# Patient Record
Sex: FEMALE | Race: WHITE | NOT HISPANIC OR LATINO | URBAN - METROPOLITAN AREA
[De-identification: names, ages, dates, MRNs, and addresses within clinical notes are randomized per-mention and may not be internally consistent; named-entity substitution may affect disease eponyms.]

---

## 2018-12-06 ENCOUNTER — EMERGENCY (EMERGENCY)
Facility: HOSPITAL | Age: 73
LOS: 1 days | Discharge: HOME | End: 2018-12-06
Attending: EMERGENCY MEDICINE
Payer: MEDICARE

## 2018-12-06 VITALS
TEMPERATURE: 99 F | SYSTOLIC BLOOD PRESSURE: 154 MMHG | RESPIRATION RATE: 18 BRPM | OXYGEN SATURATION: 97 % | HEIGHT: 64 IN | DIASTOLIC BLOOD PRESSURE: 105 MMHG | HEART RATE: 119 BPM | WEIGHT: 175.05 LBS

## 2018-12-06 LAB
ALBUMIN SERPL ELPH-MCNC: 4.4 G/DL — SIGNIFICANT CHANGE UP (ref 3.3–5)
ALP SERPL-CCNC: 94 U/L — SIGNIFICANT CHANGE UP (ref 40–120)
ALT FLD-CCNC: 11 U/L — SIGNIFICANT CHANGE UP (ref 10–45)
ANION GAP SERPL CALC-SCNC: 22 MMOL/L — HIGH (ref 5–17)
APTT BLD: 27.1 SEC — LOW (ref 27.5–36.3)
AST SERPL-CCNC: 20 U/L — SIGNIFICANT CHANGE UP (ref 10–40)
BASOPHILS # BLD AUTO: 0 K/UL — SIGNIFICANT CHANGE UP (ref 0–0.2)
BASOPHILS NFR BLD AUTO: 0.2 % — SIGNIFICANT CHANGE UP (ref 0–2)
BILIRUB SERPL-MCNC: 0.5 MG/DL — SIGNIFICANT CHANGE UP (ref 0.2–1.2)
BUN SERPL-MCNC: 27 MG/DL — HIGH (ref 7–23)
CALCIUM SERPL-MCNC: 7.5 MG/DL — LOW (ref 8.4–10.5)
CHLORIDE SERPL-SCNC: 99 MMOL/L — SIGNIFICANT CHANGE UP (ref 96–108)
CK MB CFR SERPL CALC: 1.5 NG/ML — SIGNIFICANT CHANGE UP (ref 0–3.8)
CK SERPL-CCNC: 102 U/L — SIGNIFICANT CHANGE UP (ref 25–170)
CO2 SERPL-SCNC: 20 MMOL/L — LOW (ref 22–31)
CREAT SERPL-MCNC: 1 MG/DL — SIGNIFICANT CHANGE UP (ref 0.5–1.3)
EOSINOPHIL # BLD AUTO: 0.2 K/UL — SIGNIFICANT CHANGE UP (ref 0–0.5)
EOSINOPHIL NFR BLD AUTO: 1 % — SIGNIFICANT CHANGE UP (ref 0–6)
GLUCOSE SERPL-MCNC: 175 MG/DL — HIGH (ref 70–99)
HCT VFR BLD CALC: 39.8 % — SIGNIFICANT CHANGE UP (ref 34.5–45)
HGB BLD-MCNC: 13.6 G/DL — SIGNIFICANT CHANGE UP (ref 11.5–15.5)
INR BLD: 1.44 RATIO — HIGH (ref 0.88–1.16)
LYMPHOCYTES # BLD AUTO: 13 % — SIGNIFICANT CHANGE UP (ref 13–44)
LYMPHOCYTES # BLD AUTO: 2 K/UL — SIGNIFICANT CHANGE UP (ref 1–3.3)
MCHC RBC-ENTMCNC: 30.5 PG — SIGNIFICANT CHANGE UP (ref 27–34)
MCHC RBC-ENTMCNC: 34.1 GM/DL — SIGNIFICANT CHANGE UP (ref 32–36)
MCV RBC AUTO: 89.5 FL — SIGNIFICANT CHANGE UP (ref 80–100)
MONOCYTES # BLD AUTO: 1 K/UL — HIGH (ref 0–0.9)
MONOCYTES NFR BLD AUTO: 7 % — SIGNIFICANT CHANGE UP (ref 2–14)
NEUTROPHILS # BLD AUTO: 11.9 K/UL — HIGH (ref 1.8–7.4)
NEUTROPHILS NFR BLD AUTO: 78.8 % — HIGH (ref 43–77)
PLATELET # BLD AUTO: 371 K/UL — SIGNIFICANT CHANGE UP (ref 150–400)
POTASSIUM SERPL-MCNC: 3.3 MMOL/L — LOW (ref 3.5–5.3)
POTASSIUM SERPL-SCNC: 3.3 MMOL/L — LOW (ref 3.5–5.3)
PROT SERPL-MCNC: 8.7 G/DL — HIGH (ref 6–8.3)
PROTHROM AB SERPL-ACNC: 16.8 SEC — HIGH (ref 10–12.9)
RBC # BLD: 4.45 M/UL — SIGNIFICANT CHANGE UP (ref 3.8–5.2)
RBC # FLD: 12.7 % — SIGNIFICANT CHANGE UP (ref 10.3–14.5)
SODIUM SERPL-SCNC: 141 MMOL/L — SIGNIFICANT CHANGE UP (ref 135–145)
TROPONIN T, HIGH SENSITIVITY RESULT: 16 NG/L — SIGNIFICANT CHANGE UP (ref 0–51)
TROPONIN T, HIGH SENSITIVITY RESULT: 16 NG/L — SIGNIFICANT CHANGE UP (ref 0–51)
WBC # BLD: 15.1 K/UL — HIGH (ref 3.8–10.5)
WBC # FLD AUTO: 15.1 K/UL — HIGH (ref 3.8–10.5)

## 2018-12-06 PROCEDURE — 71046 X-RAY EXAM CHEST 2 VIEWS: CPT | Mod: 26

## 2018-12-06 PROCEDURE — 82553 CREATINE MB FRACTION: CPT

## 2018-12-06 PROCEDURE — 99283 EMERGENCY DEPT VISIT LOW MDM: CPT | Mod: 25

## 2018-12-06 PROCEDURE — 80053 COMPREHEN METABOLIC PANEL: CPT

## 2018-12-06 PROCEDURE — 93005 ELECTROCARDIOGRAM TRACING: CPT

## 2018-12-06 PROCEDURE — 99284 EMERGENCY DEPT VISIT MOD MDM: CPT | Mod: GC,25

## 2018-12-06 PROCEDURE — 85730 THROMBOPLASTIN TIME PARTIAL: CPT

## 2018-12-06 PROCEDURE — 71046 X-RAY EXAM CHEST 2 VIEWS: CPT

## 2018-12-06 PROCEDURE — 93010 ELECTROCARDIOGRAM REPORT: CPT

## 2018-12-06 PROCEDURE — 85610 PROTHROMBIN TIME: CPT

## 2018-12-06 PROCEDURE — 82550 ASSAY OF CK (CPK): CPT

## 2018-12-06 PROCEDURE — 85027 COMPLETE CBC AUTOMATED: CPT

## 2018-12-06 PROCEDURE — 84484 ASSAY OF TROPONIN QUANT: CPT

## 2018-12-06 RX ORDER — SODIUM CHLORIDE 9 MG/ML
1000 INJECTION INTRAMUSCULAR; INTRAVENOUS; SUBCUTANEOUS ONCE
Qty: 0 | Refills: 0 | Status: COMPLETED | OUTPATIENT
Start: 2018-12-06 | End: 2018-12-06

## 2018-12-06 RX ADMIN — SODIUM CHLORIDE 2000 MILLILITER(S): 9 INJECTION INTRAMUSCULAR; INTRAVENOUS; SUBCUTANEOUS at 19:46

## 2018-12-06 NOTE — ED PROVIDER NOTE - OBJECTIVE STATEMENT
74 yo F c PMH of DM, HTN, HLD sent in by PMD for 1 week of fatigue, cough, and lightheadedness. no hx of sx. in pmd office today pt had ekg showing sinus tachycardia was sent to ED. no recent travel, no estrogen use, no recent surgery or travel, no personal or fh of dvt or pe.    ROS positive: cough, lightheadedness, fatigue, nausea, anorexia  ROS negative: f/c, congestion, coryza, pharyngitis, hemoptysis, abdominal pain, vomiting, leg edema, dysuria, hematuria, CP, SOB

## 2018-12-06 NOTE — ED PROVIDER NOTE - NSFOLLOWUPINSTRUCTIONS_ED_ALL_ED_FT
D/C with PMD follow up and anticipatory guidance.  Return for worsening or persistent symptoms. Such as but not limited to chest pain, difficulty breathing, weakness and numbness of your extremities, blood in your stool or urine.

## 2018-12-06 NOTE — ED ADULT NURSE NOTE - OBJECTIVE STATEMENT
73 year old female presents to ED via wheel chair through waiting room, sent by PMD complaining of lethargy, weakness, decreased appetite, mild HA, nausea x 1 week. History of DM, HTN, HLD. PMD sent her in after noting tachycardia to 119 in the office. Mild cough. Denies chest pain, shortness of breath, abdominal pain, diarrhea, dysuria, hematuria, recent illness or fall. Patient undressed and placed into gown, call bell in hand and side rails up with bed in lowest position for safety. blanket provided. Comfort and safety provided.

## 2018-12-06 NOTE — ED PROVIDER NOTE - ATTENDING CONTRIBUTION TO CARE
I, Lauren Howard MD, personally saw the patient with the resident, and completed the key components of the history and physical exam. I then discussed the management plan with the resident.   73 year old female with PMHx of DM, HTN, HLD, followed by internist/cardiologist Dr. Mock, went to him today complaining of 1 week of malaise, nausea, decreased PO intake, mild cough but resolving without sick contacts was found to be tachycardic and sent in for CXR/labs/fluids. No documented fever, no uri sx, no abd pain or diarrhea, last stress about 2 yrs ago per pt. On exam pt is in no acute distress but states she feels "lousy," tachycardic regular no murmurs, clear lungs, soft NTND abd, skin wnl. Suspect nonspecific viral syndrome but will r/o acute cardiac etiology with serial troponins, EKG, CXR, give fluids and and reassess

## 2018-12-06 NOTE — ED PROVIDER NOTE - MEDICAL DECISION MAKING DETAILS
72 yo F c PMH of DM, HTN, HLD sent in by PMD for 1 week of fatigue, cough, and lightheadedness. no hx of sx. in pmd office today pt had ekg showing sinus tachycardia was sent to ED. On exam, , VS otherwise wnl, CXR labs ekg pending, ivf for tx will reassess.

## 2018-12-07 VITALS
DIASTOLIC BLOOD PRESSURE: 78 MMHG | HEART RATE: 102 BPM | RESPIRATION RATE: 20 BRPM | SYSTOLIC BLOOD PRESSURE: 132 MMHG | TEMPERATURE: 99 F | OXYGEN SATURATION: 95 %

## 2018-12-11 ENCOUNTER — INPATIENT (INPATIENT)
Facility: HOSPITAL | Age: 73
LOS: 2 days | Discharge: HOME | DRG: 640 | End: 2018-12-14
Attending: INTERNAL MEDICINE | Admitting: INTERNAL MEDICINE
Payer: MEDICARE

## 2018-12-11 VITALS
DIASTOLIC BLOOD PRESSURE: 77 MMHG | TEMPERATURE: 98 F | HEIGHT: 64 IN | OXYGEN SATURATION: 95 % | RESPIRATION RATE: 18 BRPM | SYSTOLIC BLOOD PRESSURE: 129 MMHG | HEART RATE: 75 BPM | WEIGHT: 164.91 LBS

## 2018-12-11 DIAGNOSIS — E83.51 HYPOCALCEMIA: ICD-10-CM

## 2018-12-11 LAB
ALBUMIN SERPL ELPH-MCNC: 3.8 G/DL — SIGNIFICANT CHANGE UP (ref 3.3–5)
ALP SERPL-CCNC: 82 U/L — SIGNIFICANT CHANGE UP (ref 40–120)
ALT FLD-CCNC: 15 U/L — SIGNIFICANT CHANGE UP (ref 10–45)
ANION GAP SERPL CALC-SCNC: 21 MMOL/L — HIGH (ref 5–17)
ANION GAP SERPL CALC-SCNC: 24 MMOL/L — HIGH (ref 5–17)
APTT BLD: 27.6 SEC — SIGNIFICANT CHANGE UP (ref 27.5–36.3)
AST SERPL-CCNC: 29 U/L — SIGNIFICANT CHANGE UP (ref 10–40)
BASE EXCESS BLDV CALC-SCNC: -2.1 MMOL/L — LOW (ref -2–2)
BASOPHILS # BLD AUTO: 0.1 K/UL — SIGNIFICANT CHANGE UP (ref 0–0.2)
BASOPHILS NFR BLD AUTO: 0.4 % — SIGNIFICANT CHANGE UP (ref 0–2)
BILIRUB SERPL-MCNC: 0.6 MG/DL — SIGNIFICANT CHANGE UP (ref 0.2–1.2)
BUN SERPL-MCNC: 21 MG/DL — SIGNIFICANT CHANGE UP (ref 7–23)
BUN SERPL-MCNC: 23 MG/DL — SIGNIFICANT CHANGE UP (ref 7–23)
CA-I SERPL-SCNC: 0.81 MMOL/L — LOW (ref 1.12–1.3)
CALCIUM SERPL-MCNC: 6.3 MG/DL — CRITICAL LOW (ref 8.4–10.5)
CALCIUM SERPL-MCNC: 6.4 MG/DL — CRITICAL LOW (ref 8.4–10.5)
CALCIUM SERPL-MCNC: 6.7 MG/DL — LOW (ref 8.4–10.5)
CHLORIDE BLDV-SCNC: 103 MMOL/L — SIGNIFICANT CHANGE UP (ref 96–108)
CHLORIDE SERPL-SCNC: 97 MMOL/L — SIGNIFICANT CHANGE UP (ref 96–108)
CHLORIDE SERPL-SCNC: 98 MMOL/L — SIGNIFICANT CHANGE UP (ref 96–108)
CO2 BLDV-SCNC: 22 MMOL/L — SIGNIFICANT CHANGE UP (ref 22–30)
CO2 SERPL-SCNC: 19 MMOL/L — LOW (ref 22–31)
CO2 SERPL-SCNC: 20 MMOL/L — LOW (ref 22–31)
CREAT SERPL-MCNC: 0.97 MG/DL — SIGNIFICANT CHANGE UP (ref 0.5–1.3)
CREAT SERPL-MCNC: 1.06 MG/DL — SIGNIFICANT CHANGE UP (ref 0.5–1.3)
EOSINOPHIL # BLD AUTO: 0.1 K/UL — SIGNIFICANT CHANGE UP (ref 0–0.5)
EOSINOPHIL NFR BLD AUTO: 0.9 % — SIGNIFICANT CHANGE UP (ref 0–6)
GAS PNL BLDV: 138 MMOL/L — SIGNIFICANT CHANGE UP (ref 136–145)
GAS PNL BLDV: SIGNIFICANT CHANGE UP
GAS PNL BLDV: SIGNIFICANT CHANGE UP
GLUCOSE BLDC GLUCOMTR-MCNC: 171 MG/DL — HIGH (ref 70–99)
GLUCOSE BLDV-MCNC: 155 MG/DL — HIGH (ref 70–99)
GLUCOSE SERPL-MCNC: 162 MG/DL — HIGH (ref 70–99)
GLUCOSE SERPL-MCNC: 98 MG/DL — SIGNIFICANT CHANGE UP (ref 70–99)
HCO3 BLDV-SCNC: 21 MMOL/L — SIGNIFICANT CHANGE UP (ref 21–29)
HCT VFR BLD CALC: 38.3 % — SIGNIFICANT CHANGE UP (ref 34.5–45)
HCT VFR BLDA CALC: 39 % — SIGNIFICANT CHANGE UP (ref 39–50)
HGB BLD CALC-MCNC: 12.7 G/DL — SIGNIFICANT CHANGE UP (ref 11.5–15.5)
HGB BLD-MCNC: 12.8 G/DL — SIGNIFICANT CHANGE UP (ref 11.5–15.5)
INR BLD: 1.58 RATIO — HIGH (ref 0.88–1.16)
LACTATE BLDV-MCNC: 1.7 MMOL/L — SIGNIFICANT CHANGE UP (ref 0.7–2)
LYMPHOCYTES # BLD AUTO: 1.9 K/UL — SIGNIFICANT CHANGE UP (ref 1–3.3)
LYMPHOCYTES # BLD AUTO: 13.9 % — SIGNIFICANT CHANGE UP (ref 13–44)
MCHC RBC-ENTMCNC: 29.8 PG — SIGNIFICANT CHANGE UP (ref 27–34)
MCHC RBC-ENTMCNC: 33.5 GM/DL — SIGNIFICANT CHANGE UP (ref 32–36)
MCV RBC AUTO: 89.2 FL — SIGNIFICANT CHANGE UP (ref 80–100)
MONOCYTES # BLD AUTO: 1.3 K/UL — HIGH (ref 0–0.9)
MONOCYTES NFR BLD AUTO: 9.5 % — SIGNIFICANT CHANGE UP (ref 2–14)
NEUTROPHILS # BLD AUTO: 10.3 K/UL — HIGH (ref 1.8–7.4)
NEUTROPHILS NFR BLD AUTO: 75.3 % — SIGNIFICANT CHANGE UP (ref 43–77)
PCO2 BLDV: 32 MMHG — LOW (ref 35–50)
PH BLDV: 7.44 — SIGNIFICANT CHANGE UP (ref 7.35–7.45)
PLATELET # BLD AUTO: 353 K/UL — SIGNIFICANT CHANGE UP (ref 150–400)
PO2 BLDV: 66 MMHG — HIGH (ref 25–45)
POTASSIUM BLDV-SCNC: 2.8 MMOL/L — CRITICAL LOW (ref 3.5–5.3)
POTASSIUM SERPL-MCNC: 3 MMOL/L — LOW (ref 3.5–5.3)
POTASSIUM SERPL-MCNC: 3.1 MMOL/L — LOW (ref 3.5–5.3)
POTASSIUM SERPL-SCNC: 3 MMOL/L — LOW (ref 3.5–5.3)
POTASSIUM SERPL-SCNC: 3.1 MMOL/L — LOW (ref 3.5–5.3)
PROT SERPL-MCNC: 7.9 G/DL — SIGNIFICANT CHANGE UP (ref 6–8.3)
PROTHROM AB SERPL-ACNC: 18.3 SEC — HIGH (ref 10–12.9)
PTH-INTACT FLD-MCNC: 26 PG/ML — SIGNIFICANT CHANGE UP (ref 15–65)
RBC # BLD: 4.3 M/UL — SIGNIFICANT CHANGE UP (ref 3.8–5.2)
RBC # FLD: 12.9 % — SIGNIFICANT CHANGE UP (ref 10.3–14.5)
SAO2 % BLDV: 91 % — HIGH (ref 67–88)
SODIUM SERPL-SCNC: 139 MMOL/L — SIGNIFICANT CHANGE UP (ref 135–145)
SODIUM SERPL-SCNC: 140 MMOL/L — SIGNIFICANT CHANGE UP (ref 135–145)
WBC # BLD: 13.7 K/UL — HIGH (ref 3.8–10.5)
WBC # FLD AUTO: 13.7 K/UL — HIGH (ref 3.8–10.5)

## 2018-12-11 PROCEDURE — 99285 EMERGENCY DEPT VISIT HI MDM: CPT | Mod: GC,25

## 2018-12-11 PROCEDURE — 71046 X-RAY EXAM CHEST 2 VIEWS: CPT | Mod: 26

## 2018-12-11 PROCEDURE — 93010 ELECTROCARDIOGRAM REPORT: CPT

## 2018-12-11 PROCEDURE — 71275 CT ANGIOGRAPHY CHEST: CPT | Mod: 26

## 2018-12-11 RX ORDER — METOPROLOL TARTRATE 50 MG
200 TABLET ORAL DAILY
Qty: 0 | Refills: 0 | Status: DISCONTINUED | OUTPATIENT
Start: 2018-12-11 | End: 2018-12-14

## 2018-12-11 RX ORDER — POTASSIUM CHLORIDE 20 MEQ
20 PACKET (EA) ORAL THREE TIMES A DAY
Qty: 0 | Refills: 0 | Status: DISCONTINUED | OUTPATIENT
Start: 2018-12-11 | End: 2018-12-11

## 2018-12-11 RX ORDER — ATORVASTATIN CALCIUM 80 MG/1
40 TABLET, FILM COATED ORAL AT BEDTIME
Qty: 0 | Refills: 0 | Status: DISCONTINUED | OUTPATIENT
Start: 2018-12-11 | End: 2018-12-14

## 2018-12-11 RX ORDER — ACETAMINOPHEN 500 MG
975 TABLET ORAL ONCE
Qty: 0 | Refills: 0 | Status: COMPLETED | OUTPATIENT
Start: 2018-12-11 | End: 2018-12-11

## 2018-12-11 RX ORDER — AMLODIPINE BESYLATE 2.5 MG/1
5 TABLET ORAL DAILY
Qty: 0 | Refills: 0 | Status: DISCONTINUED | OUTPATIENT
Start: 2018-12-11 | End: 2018-12-14

## 2018-12-11 RX ORDER — POTASSIUM CHLORIDE 20 MEQ
40 PACKET (EA) ORAL ONCE
Qty: 0 | Refills: 0 | Status: COMPLETED | OUTPATIENT
Start: 2018-12-11 | End: 2018-12-11

## 2018-12-11 RX ORDER — LISINOPRIL 2.5 MG/1
40 TABLET ORAL DAILY
Qty: 0 | Refills: 0 | Status: DISCONTINUED | OUTPATIENT
Start: 2018-12-11 | End: 2018-12-14

## 2018-12-11 RX ORDER — CALCIUM CARBONATE 500(1250)
1 TABLET ORAL ONCE
Qty: 0 | Refills: 0 | Status: COMPLETED | OUTPATIENT
Start: 2018-12-11 | End: 2018-12-11

## 2018-12-11 RX ORDER — DEXTROSE MONOHYDRATE, SODIUM CHLORIDE, AND POTASSIUM CHLORIDE 50; .745; 4.5 G/1000ML; G/1000ML; G/1000ML
1000 INJECTION, SOLUTION INTRAVENOUS
Qty: 0 | Refills: 0 | Status: DISCONTINUED | OUTPATIENT
Start: 2018-12-11 | End: 2018-12-13

## 2018-12-11 RX ORDER — INFLUENZA VIRUS VACCINE 15; 15; 15; 15 UG/.5ML; UG/.5ML; UG/.5ML; UG/.5ML
0.5 SUSPENSION INTRAMUSCULAR ONCE
Qty: 0 | Refills: 0 | Status: COMPLETED | OUTPATIENT
Start: 2018-12-11 | End: 2018-12-14

## 2018-12-11 RX ORDER — PANTOPRAZOLE SODIUM 20 MG/1
40 TABLET, DELAYED RELEASE ORAL
Qty: 0 | Refills: 0 | Status: DISCONTINUED | OUTPATIENT
Start: 2018-12-11 | End: 2018-12-14

## 2018-12-11 RX ORDER — MAGNESIUM SULFATE 500 MG/ML
2 VIAL (ML) INJECTION ONCE
Qty: 0 | Refills: 0 | Status: COMPLETED | OUTPATIENT
Start: 2018-12-11 | End: 2018-12-11

## 2018-12-11 RX ORDER — POTASSIUM CHLORIDE 20 MEQ
20 PACKET (EA) ORAL
Qty: 0 | Refills: 0 | Status: DISCONTINUED | OUTPATIENT
Start: 2018-12-11 | End: 2018-12-13

## 2018-12-11 RX ADMIN — Medication 20 MILLIEQUIVALENT(S): at 23:43

## 2018-12-11 RX ADMIN — Medication 20 MILLIEQUIVALENT(S): at 21:19

## 2018-12-11 RX ADMIN — Medication 975 MILLIGRAM(S): at 15:29

## 2018-12-11 RX ADMIN — Medication 50 GRAM(S): at 11:41

## 2018-12-11 RX ADMIN — Medication 40 MILLIEQUIVALENT(S): at 11:41

## 2018-12-11 RX ADMIN — DEXTROSE MONOHYDRATE, SODIUM CHLORIDE, AND POTASSIUM CHLORIDE 100 MILLILITER(S): 50; .745; 4.5 INJECTION, SOLUTION INTRAVENOUS at 21:19

## 2018-12-11 RX ADMIN — Medication 1 TABLET(S): at 21:19

## 2018-12-11 RX ADMIN — ATORVASTATIN CALCIUM 40 MILLIGRAM(S): 80 TABLET, FILM COATED ORAL at 21:19

## 2018-12-11 RX ADMIN — Medication 1 TABLET(S): at 13:05

## 2018-12-11 RX ADMIN — PANTOPRAZOLE SODIUM 40 MILLIGRAM(S): 20 TABLET, DELAYED RELEASE ORAL at 20:35

## 2018-12-11 NOTE — H&P ADULT - NSHPLABSRESULTS_GEN_ALL_CORE
LABS:                        12.8   13.7  )-----------( 353      ( 11 Dec 2018 11:16 )             38.3     12-11    139  |  98  |  21  ----------------------------<  98  3.0<L>   |  20<L>  |  0.97    Ca    6.7<L>      11 Dec 2018 16:19    TPro  7.9  /  Alb  3.8  /  TBili  0.6  /  DBili  x   /  AST  29  /  ALT  15  /  AlkPhos  82  12-11    PT/INR - ( 11 Dec 2018 11:47 )   PT: 18.3 sec;   INR: 1.58 ratio         PTT - ( 11 Dec 2018 11:47 )  PTT:27.6 sec        RADIOLOGY & ADDITIONAL TESTS:

## 2018-12-11 NOTE — ED PROVIDER NOTE - PHYSICAL EXAMINATION
Lissette Lucia, .:   GENERAL: Patient awake alert NAD.  HEENT: NC/AT, Moist mucous membranes, PERRL, EOMI.  LUNGS: CTAB, no wheezes or crackles.   CARDIAC: RRR, no m/r/g.    ABDOMEN: Soft, NT, ND, No rebound, guarding.   EXT: No edema. No calf tenderness.  MSK: No spinal tenderness, no pain with movement, no deformities.  NEURO: A&Ox3. Gait normal.    SKIN: Warm and dry. No rash.  PSYCH: Normal affect.

## 2018-12-11 NOTE — ED PROVIDER NOTE - ATTENDING CONTRIBUTION TO CARE
pt sent in by dr johnson for k 2.8, ca 6.5, vague symptoms of malaise and poor appetite.   clear lungs, nad, non-tender abdomen. no leg swelling.   basic labs, given the ekg findings, low o2 sat and dr johnson echo in office w pulm art pressures in 50s, will ro ct.

## 2018-12-11 NOTE — ED ADULT TRIAGE NOTE - CHIEF COMPLAINT QUOTE
Pt reports 2 weeks ago she started with cough, malaise, nausea, decreased PO intake, flu-like sx.  Came to ED Thursday; labs, CXR, EKG were normal, pt got IV fluids and nausea meds, and d/c home to f/u with PCP.   PCP rx Cefuroxime and Zofran on Sunday because pt called and told him she had continued sx.   Pt saw PCP Monday (yesterday), got repeat labs and PCP called pt this morning for low K and Ca. Pt is on Diuretic HCTZ. Pt reports 2 weeks ago she started with cough, malaise, nausea, decreased PO intake, flu-like sx.  Came to ED Thursday; labs, CXR, EKG were normal, pt got IV fluids and nausea meds, and d/c home to f/u with PCP.   PCP rx Cefuroxime and Zofran on Sunday because pt called and told him she had continued sx.   Pt saw PCP Monday (yesterday), got repeat labs, told PCP she was also having palpitations, PCP called pt this morning for low K and Ca. Pt is on Diuretic HCTZ.

## 2018-12-11 NOTE — CHART NOTE - NSCHARTNOTEFT_GEN_A_CORE
MEDICINE NP    GOLDBERG, EDITH  73y Female    Patient is a 73y old  Female who presents with a chief complaint of Hypokalemia and hypocalcemia (11 Dec 2018 18:21)       > Event Summary:  74 y/o Female, Admitted with Hypocalcemia and Hypokalemia.    Patient seen at bedside, AAOx3, Denies chest pain, sob, palpitations, fatigue, weakness, N/V/D.    -D/w Attending, Dr. Osborne, Patient may hold off on Telemetry at present, change PO Potassium 20mEq to 4x/per day and f/u with AM Labs  -Will c/w monitoring and endorse to incoming shift in AM         -Vital Signs Last 24 Hrs  T(C): 36.3 (11 Dec 2018 19:31), Max: 36.8 (11 Dec 2018 10:50)  T(F): 97.3 (11 Dec 2018 19:31), Max: 98.2 (11 Dec 2018 10:50)  HR: 68 (11 Dec 2018 19:31) (59 - 75)  BP: 129/84 (11 Dec 2018 19:31) (129/77 - 137/83)  RR: 18 (11 Dec 2018 19:31) (18 - 18)  SpO2: 98% (11 Dec 2018 19:31) (90% - 99%)      Stephanie Braxton, JOVANY-BC  Medicine Department  #[id number]

## 2018-12-11 NOTE — ED ADULT NURSE NOTE - CHIEF COMPLAINT QUOTE
Pt reports 2 weeks ago she started with cough, malaise, nausea, decreased PO intake, flu-like sx.  Came to ED Thursday; labs, CXR, EKG were normal, pt got IV fluids and nausea meds, and d/c home to f/u with PCP.   PCP rx Cefuroxime and Zofran on Sunday because pt called and told him she had continued sx.   Pt saw PCP Monday (yesterday), got repeat labs, told PCP she was also having palpitations, PCP called pt this morning for low K and Ca. Pt is on Diuretic HCTZ.

## 2018-12-11 NOTE — ED PROVIDER NOTE - NS ED ROS FT
CONST: no fevers, +chills, +malaise  EYES: no pain, no vision changes  ENT: no sore throat, no ear pain, no change in hearing  CV: no chest pain, no leg swelling  RESP: no shortness of breath, no cough  ABD: no abdominal pain, +nausea, no vomiting, no diarrhea  : no dysuria, no flank pain, no hematuria  MSK: no back pain, no extremity pain  NEURO: no headache or additional neurologic complaints  HEME: no easy bleeding  SKIN:  no rash

## 2018-12-11 NOTE — CONSULT NOTE ADULT - SUBJECTIVE AND OBJECTIVE BOX
CHIEF COMPLAINT:Patient is a 73y old  Female who presents with a chief complaint of sob.    HPI: pt is well known to me.  3F h/o HTN, HLD, DM presents with lab results showing hypokalemia and hypocalcemia. Was here last week for URI symptoms, which persisted so went to PMD on monday, prescribed Cefuroxime and Zofran for nausea, and found abnormal lab results today. Admits to nausea, headache, general malaise and fatigue. Cough has improved. No other complaints.  hypoxic on ra - goes to 99 on 3lnc.    PAST MEDICAL & SURGICAL HISTORY:  HLD (hyperlipidemia)  DM (diabetes mellitus)  HTN (hypertension)  No significant past surgical history      MEDICATIONS  (STANDING):    MEDICATIONS  (PRN):      FAMILY HISTORY:  No pertinent family history in first degree relatives      SOCIAL HISTORY:    [ ] Non-smoker  [ ] Smoker  [ ] Alcohol    Allergies    No Known Allergies    Intolerances    	    REVIEW OF SYSTEMS:  CONSTITUTIONAL: No fever, weight loss, or fatigue  EYES: No eye pain, visual disturbances, or discharge  ENT:  No difficulty hearing, tinnitus, vertigo; No sinus or throat pain  NECK: No pain or stiffness  RESPIRATORY: No cough, wheezing, chills or hemoptysis; +Shortness of Breath  CARDIOVASCULAR: No chest pain, palpitations, passing out, dizziness, or leg swelling  GASTROINTESTINAL: No abdominal or epigastric pain. No nausea, vomiting, or hematemesis; No diarrhea or constipation. No melena or hematochezia.  GENITOURINARY: No dysuria, frequency, hematuria, or incontinence  NEUROLOGICAL: No headaches, memory loss, loss of strength, numbness, or tremors  SKIN: No itching, burning, rashes, or lesions   LYMPH Nodes: No enlarged glands  ENDOCRINE: No heat or cold intolerance; No hair loss  MUSCULOSKELETAL: No joint pain or swelling; No muscle, back, or extremity pain  PSYCHIATRIC: No depression, anxiety, mood swings, or difficulty sleeping  HEME/LYMPH: No easy bruising, or bleeding gums  ALLERGY AND IMMUNOLOGIC: No hives or eczema	    [ ] All others negative	  [ ] Unable to obtain    PHYSICAL EXAM:  T(C): 36.8 (12-11-18 @ 10:50), Max: 36.8 (12-11-18 @ 10:50)  HR: 67 (12-11-18 @ 15:32) (59 - 75)  BP: 137/83 (12-11-18 @ 15:32) (129/77 - 137/83)  RR: 18 (12-11-18 @ 15:32) (18 - 18)  SpO2: 99% (12-11-18 @ 15:32) (90% - 99%)  Wt(kg): --  I&O's Summary      Appearance: Normal	  HEENT:   Normal oral mucosa, PERRL, EOMI	  Lymphatic: No lymphadenopathy  Cardiovascular: Normal S1 S2, No JVD, + murmurs, + edema  Respiratory: Lungs clear to auscultation	  Psychiatry: A & O x 3, Mood & affect appropriate  Gastrointestinal:  Soft, Non-tender, + BS	  Skin: No rashes, No ecchymoses, No cyanosis	  Neurologic: Non-focal  Extremities: Normal range of motion, No clubbing, cyanosis or edema  Vascular: Peripheral pulses palpable 2+ bilaterally    TELEMETRY: 	    ECG:  	  RADIOLOGY:  OTHER: 	  	  LABS:	 	    CARDIAC MARKERS:                              12.8   13.7  )-----------( 353      ( 11 Dec 2018 11:16 )             38.3     12-11    139  |  98  |  21  ----------------------------<  98  3.0<L>   |  20<L>  |  0.97    Ca    6.7<L>      11 Dec 2018 16:19    TPro  7.9  /  Alb  3.8  /  TBili  0.6  /  DBili  x   /  AST  29  /  ALT  15  /  AlkPhos  82  12-11    proBNP:   Lipid Profile:   HgA1c:   TSH:   PT/INR - ( 11 Dec 2018 11:47 )   PT: 18.3 sec;   INR: 1.58 ratio         PTT - ( 11 Dec 2018 11:47 )  PTT:27.6 sec    PREVIOUS DIAGNOSTIC TESTING:    < from: CT Angio Chest w/ IV Cont (12.11.18 @ 12:58) >  Heartis normal in size. Calcification the coronary arteries is noted. No   pericardial effusion is noted. Pulmonary arteries are normal in caliber.   No filling defects are noted.    Very large hiatal hernia is noted.    No endobronchial lesions are noted. 0.4 cm solid nodule is noted in the   right middle lobe. Centrilobular emphysema is noted bilaterally. No   pleural effusions are noted.    Below the diaphragm, visualized portions of the abdomen are unremarkable.     Degenerative changes of the spine are noted.    Impression: No pulmonary embolus is noted.    Very large hiatal hernia.    < from: 12 Lead ECG (12.06.18 @ 21:00) >   SINUS TACHYCARDIA  LOW VOLTAGE QRS  NONSPECIFIC T WAVE ABNORMALITY  ABNORMAL ECG      < from: Xray Chest 2 Views PA/Lat (12.11.18 @ 12:23) >  The heart is normal in size. The lungs are clear. A big hiatal hernia is   seen on the left. No change in the appearance of the chest when compared   to previous study done December 6, 2018.    < end of copied text >

## 2018-12-11 NOTE — H&P ADULT - ASSESSMENT
73 year old female PMH HTN, HLD, DM(II), presents with lab results showing hypokalemia and hypocalcemia. Was here last week for URI symptoms, which persisted so went to PMD on Monday. Prescribed Cefuroxime and Zofran for nausea, and was found to have abnormal lab results today. Referred to the ER for eval and admit.  Admits to nausea, headache, general malaise and fatigue. Cough has improved. No other complaints.  In ER mild hypoxia noted.     CV: 73 year old female PMH HTN, HLD, DM(II), presents with lab results showing hypokalemia and hypocalcemia. Was here last week for URI symptoms, which persisted so went to PMD on Monday. Prescribed Cefuroxime and Zofran for nausea, and was found to have abnormal lab results today. Referred to the ER for eval and admit.  Admits to nausea, headache, general malaise and fatigue. Cough has improved. No other complaints.  In ER mild hypoxia noted.     CV: Stop HCTZ given hypokalemia. Continue Toprol  mg/day, Norvasc 5 mg/day and Lisinopril 40 mg/day.       Replace KCL TID. Cm 73 year old female PMH HTN, HLD, DM(II), presents with lab results showing hypokalemia and hypocalcemia. Was here last week for URI symptoms, which persisted so went to PMD on Monday. Prescribed Cefuroxime and Zofran for nausea, and was found to have abnormal lab results today. Referred to the ER for eval and admit.  Admits to nausea, headache, general malaise and fatigue. Cough has improved. No other complaints.  In ER mild hypoxia noted.     CV: Stop HCTZ given hypokalemia. BP stable on arrival 137/82.   Continue Toprol  mg/day, Norvasc 5 mg/day and Lisinopril 40 mg/day.     Renal: Replace KCL 20 meq TID. Magnesium level in AM. Elevated anion gap likely from dehydration.   IVF normal saline 100/h with 20 KCL. Calcium 6.3. Baseline level 7.5. Will replace OSCAL tid for now.   Recheck all lytes in AM.     Endo: Hold Metformen while in hospital and with increased anion gap acidosis.   Finger sticks AC and HS with SSC.     GI: Continue Prilosec 40 mg/day with Maalox prn.     COPD: CTA notes no PE or effusions. Mild centolobular emphysema noted.   No wheeze on exam. No treatment for now but does explain low oxygen sats in the ER.

## 2018-12-11 NOTE — ED PROVIDER NOTE - OBJECTIVE STATEMENT
73F h/o HTN, HLD, DM presents with lab results showing hypokalemia and hypocalcemia. Was here last week for URI symptoms, which persisted so went to PMD on monday, prescribed Cefuroxime and Zofran for nausea, and found abnormal lab results today. Admits to nausea, headache, general malaise and fatigue. Cough has improved. No other complaints. 73F h/o HTN, HLD, DM presents with lab results showing hypokalemia and hypocalcemia. Was here last week for URI symptoms, which persisted so went to PMD on monday, prescribed Cefuroxime and Zofran for nausea, and found abnormal lab results today. Admits to nausea, headache, general malaise and fatigue. Cough has improved. No other complaints.  hypoxic on ra - goes to 99 on 3lnc.

## 2018-12-11 NOTE — CONSULT NOTE ADULT - ASSESSMENT
pt with sob/cta result note  echo  tele  stress test depends on echo  repeat lytes  dvt prophylaxis  will f/u pt with sob/cta result note  echo  tele  stress test depends on echo  repeat lytes  dvt prophylaxis  will f/u  parathyroid level/albumin level

## 2018-12-11 NOTE — ED ADULT NURSE NOTE - OBJECTIVE STATEMENT
Pt is a 74 yo F who came to the ED amb c/o abnormal labs. States she had f/u labs done yesterday and has low Calcium and Potassium. States she has been feeling "crappy" for the past 2 weeks, feels fatigue, malaise, headache, nausea, cough, no fever. Pt was seen here for the same on Thurs, had labs, scans done, had a negative workup. States she still feels no improvement and saw her pmd yesterday who repeated the lab work. A/O x3, states cough has improved.

## 2018-12-11 NOTE — H&P ADULT - HISTORY OF PRESENT ILLNESS
73 year old female PMH HTN, HLD, DM(II), presents with lab results showing hypokalemia and hypocalcemia. Was here last week for URI symptoms, which persisted so went to PMD on Monday. Prescribed Cefuroxime and Zofran for nausea, and was found to have abnormal lab results today. Referred to the ER for eval and admit.  Admits to nausea, headache, general malaise and fatigue. Cough has improved. No other complaints.  In ER mild hypoxia noted. 73 year old female PMH HTN, HLD, DM(II), presents with lab results showing hypokalemia and hypocalcemia. Was here last week for URI symptoms, which persisted so went to PMD on Monday. Prescribed Cefuroxime and Zofran for nausea, and was found to have abnormal lab results today. Referred to the ER for eval and admit.  Admits to nausea, headache, general malaise and fatigue. Cough has improved. No other complaints.  In ER mild hypoxia noted. Also admits to limited oral intake for past few days.

## 2018-12-11 NOTE — H&P ADULT - NSHPPHYSICALEXAM_GEN_ALL_CORE
PHYSICAL EXAMINATION:  Vital Signs Last 24 Hrs  T(C): 36.8 (11 Dec 2018 10:50), Max: 36.8 (11 Dec 2018 10:50)  T(F): 98.2 (11 Dec 2018 10:50), Max: 98.2 (11 Dec 2018 10:50)  HR: 67 (11 Dec 2018 15:32) (59 - 75)  BP: 137/83 (11 Dec 2018 15:32) (129/77 - 137/83)  BP(mean): --  RR: 18 (11 Dec 2018 15:32) (18 - 18)  SpO2: 99% (11 Dec 2018 15:32) (90% - 99%)  CAPILLARY BLOOD GLUCOSE          GENERAL: NAD, well-groomed, well-developed  HEAD:  atraumatic, normocephalic  EYES: sclera anicteric  ENMT: mucous membranes moist  NECK: supple, No JVD  CHEST/LUNG: clear to auscultation bilaterally; no rales, rhonchi, or wheezing b/l  HEART: normal S1, S2  ABDOMEN: BS+, soft, ND, NT   EXTREMITIES:  pulses palpable; no clubbing, cyanosis, or edema b/l LEs  NEURO: awake, alert, interactive; moves all extremities  SKIN: no rashes or lesions PHYSICAL EXAMINATION:  Vital Signs Last 24 Hrs  T(C): 36.8 (11 Dec 2018 10:50), Max: 36.8 (11 Dec 2018 10:50)  T(F): 98.2 (11 Dec 2018 10:50), Max: 98.2 (11 Dec 2018 10:50)  HR: 67 (11 Dec 2018 15:32) (59 - 75)  BP: 137/83 (11 Dec 2018 15:32) (129/77 - 137/83)  BP(mean): --  RR: 18 (11 Dec 2018 15:32) (18 - 18)  SpO2: 99% (11 Dec 2018 15:32) (90% - 99%)  CAPILLARY BLOOD GLUCOSE          GENERAL: NAD, well-groomed, seen on 4 DSU, no SOB or CP  HEAD:  atraumatic, normocephalic  EYES: sclera anicteric  ENMT: mucous membranes moist  NECK: supple, No JVD  CHEST/LUNG: clear to auscultation bilaterally; no rales, rhonchi, or wheezing b/l  HEART: normal S1, S2  ABDOMEN: BS+, soft, ND, NT   EXTREMITIES:  pulses palpable; no clubbing, cyanosis, or edema b/l LEs  NEURO: awake, alert, interactive; moves all extremities  SKIN: no rashes or lesions

## 2018-12-12 ENCOUNTER — TRANSCRIPTION ENCOUNTER (OUTPATIENT)
Age: 73
End: 2018-12-12

## 2018-12-12 LAB
ALBUMIN SERPL ELPH-MCNC: 3.4 G/DL — SIGNIFICANT CHANGE UP (ref 3.3–5)
ALP SERPL-CCNC: 75 U/L — SIGNIFICANT CHANGE UP (ref 40–120)
ALT FLD-CCNC: 13 U/L — SIGNIFICANT CHANGE UP (ref 10–45)
ANION GAP SERPL CALC-SCNC: 15 MMOL/L — SIGNIFICANT CHANGE UP (ref 5–17)
AST SERPL-CCNC: 19 U/L — SIGNIFICANT CHANGE UP (ref 10–40)
BILIRUB DIRECT SERPL-MCNC: 0.1 MG/DL — SIGNIFICANT CHANGE UP (ref 0–0.2)
BILIRUB INDIRECT FLD-MCNC: 0.4 MG/DL — SIGNIFICANT CHANGE UP (ref 0.2–1)
BILIRUB SERPL-MCNC: 0.5 MG/DL — SIGNIFICANT CHANGE UP (ref 0.2–1.2)
BUN SERPL-MCNC: 22 MG/DL — SIGNIFICANT CHANGE UP (ref 7–23)
CALCIUM SERPL-MCNC: 7 MG/DL — LOW (ref 8.4–10.5)
CHLORIDE SERPL-SCNC: 104 MMOL/L — SIGNIFICANT CHANGE UP (ref 96–108)
CO2 SERPL-SCNC: 21 MMOL/L — LOW (ref 22–31)
CREAT SERPL-MCNC: 0.93 MG/DL — SIGNIFICANT CHANGE UP (ref 0.5–1.3)
GLUCOSE BLDC GLUCOMTR-MCNC: 117 MG/DL — HIGH (ref 70–99)
GLUCOSE BLDC GLUCOMTR-MCNC: 124 MG/DL — HIGH (ref 70–99)
GLUCOSE BLDC GLUCOMTR-MCNC: 135 MG/DL — HIGH (ref 70–99)
GLUCOSE BLDC GLUCOMTR-MCNC: 155 MG/DL — HIGH (ref 70–99)
GLUCOSE SERPL-MCNC: 95 MG/DL — SIGNIFICANT CHANGE UP (ref 70–99)
HCT VFR BLD CALC: 34.7 % — SIGNIFICANT CHANGE UP (ref 34.5–45)
HGB BLD-MCNC: 11.3 G/DL — LOW (ref 11.5–15.5)
LACTATE SERPL-SCNC: 1 MMOL/L — SIGNIFICANT CHANGE UP (ref 0.7–2)
MAGNESIUM SERPL-MCNC: 1 MG/DL — CRITICAL LOW (ref 1.6–2.6)
MCHC RBC-ENTMCNC: 29 PG — SIGNIFICANT CHANGE UP (ref 27–34)
MCHC RBC-ENTMCNC: 32.6 GM/DL — SIGNIFICANT CHANGE UP (ref 32–36)
MCV RBC AUTO: 89 FL — SIGNIFICANT CHANGE UP (ref 80–100)
PLATELET # BLD AUTO: 315 K/UL — SIGNIFICANT CHANGE UP (ref 150–400)
POTASSIUM SERPL-MCNC: 3.7 MMOL/L — SIGNIFICANT CHANGE UP (ref 3.5–5.3)
POTASSIUM SERPL-SCNC: 3.7 MMOL/L — SIGNIFICANT CHANGE UP (ref 3.5–5.3)
PROT SERPL-MCNC: 6.9 G/DL — SIGNIFICANT CHANGE UP (ref 6–8.3)
RBC # BLD: 3.9 M/UL — SIGNIFICANT CHANGE UP (ref 3.8–5.2)
RBC # FLD: 13.6 % — SIGNIFICANT CHANGE UP (ref 10.3–14.5)
SODIUM SERPL-SCNC: 140 MMOL/L — SIGNIFICANT CHANGE UP (ref 135–145)
WBC # BLD: 10.1 K/UL — SIGNIFICANT CHANGE UP (ref 3.8–10.5)
WBC # FLD AUTO: 10.1 K/UL — SIGNIFICANT CHANGE UP (ref 3.8–10.5)

## 2018-12-12 PROCEDURE — 93306 TTE W/DOPPLER COMPLETE: CPT | Mod: 26

## 2018-12-12 RX ORDER — MAGNESIUM SULFATE 500 MG/ML
2 VIAL (ML) INJECTION ONCE
Qty: 0 | Refills: 0 | Status: DISCONTINUED | OUTPATIENT
Start: 2018-12-12 | End: 2018-12-12

## 2018-12-12 RX ORDER — MAGNESIUM OXIDE 400 MG ORAL TABLET 241.3 MG
400 TABLET ORAL DAILY
Qty: 0 | Refills: 0 | Status: DISCONTINUED | OUTPATIENT
Start: 2018-12-12 | End: 2018-12-14

## 2018-12-12 RX ORDER — ACETAMINOPHEN 500 MG
650 TABLET ORAL ONCE
Qty: 0 | Refills: 0 | Status: COMPLETED | OUTPATIENT
Start: 2018-12-12 | End: 2018-12-12

## 2018-12-12 RX ORDER — MAGNESIUM SULFATE 500 MG/ML
2 VIAL (ML) INJECTION ONCE
Qty: 0 | Refills: 0 | Status: COMPLETED | OUTPATIENT
Start: 2018-12-12 | End: 2018-12-12

## 2018-12-12 RX ADMIN — Medication 20 MILLIEQUIVALENT(S): at 12:29

## 2018-12-12 RX ADMIN — Medication 200 MILLIGRAM(S): at 06:01

## 2018-12-12 RX ADMIN — Medication 50 GRAM(S): at 07:58

## 2018-12-12 RX ADMIN — ATORVASTATIN CALCIUM 40 MILLIGRAM(S): 80 TABLET, FILM COATED ORAL at 21:05

## 2018-12-12 RX ADMIN — Medication 1 TABLET(S): at 16:29

## 2018-12-12 RX ADMIN — DEXTROSE MONOHYDRATE, SODIUM CHLORIDE, AND POTASSIUM CHLORIDE 75 MILLILITER(S): 50; .745; 4.5 INJECTION, SOLUTION INTRAVENOUS at 21:09

## 2018-12-12 RX ADMIN — Medication 20 MILLIEQUIVALENT(S): at 06:01

## 2018-12-12 RX ADMIN — DEXTROSE MONOHYDRATE, SODIUM CHLORIDE, AND POTASSIUM CHLORIDE 100 MILLILITER(S): 50; .745; 4.5 INJECTION, SOLUTION INTRAVENOUS at 08:00

## 2018-12-12 RX ADMIN — Medication 650 MILLIGRAM(S): at 08:55

## 2018-12-12 RX ADMIN — Medication 20 MILLIEQUIVALENT(S): at 21:05

## 2018-12-12 RX ADMIN — DEXTROSE MONOHYDRATE, SODIUM CHLORIDE, AND POTASSIUM CHLORIDE 75 MILLILITER(S): 50; .745; 4.5 INJECTION, SOLUTION INTRAVENOUS at 08:36

## 2018-12-12 RX ADMIN — Medication 1 TABLET(S): at 21:05

## 2018-12-12 RX ADMIN — LISINOPRIL 40 MILLIGRAM(S): 2.5 TABLET ORAL at 06:01

## 2018-12-12 RX ADMIN — MAGNESIUM OXIDE 400 MG ORAL TABLET 400 MILLIGRAM(S): 241.3 TABLET ORAL at 16:28

## 2018-12-12 RX ADMIN — PANTOPRAZOLE SODIUM 40 MILLIGRAM(S): 20 TABLET, DELAYED RELEASE ORAL at 06:01

## 2018-12-12 RX ADMIN — AMLODIPINE BESYLATE 5 MILLIGRAM(S): 2.5 TABLET ORAL at 06:01

## 2018-12-12 RX ADMIN — Medication 650 MILLIGRAM(S): at 08:22

## 2018-12-12 RX ADMIN — Medication 1 TABLET(S): at 06:01

## 2018-12-12 RX ADMIN — Medication 20 MILLIEQUIVALENT(S): at 16:29

## 2018-12-12 NOTE — DISCHARGE NOTE ADULT - SECONDARY DIAGNOSIS.
Hypokalemia Hypomagnesemia Acute bronchitis COPD (chronic obstructive pulmonary disease) with acute bronchitis Pulmonary HTN HTN (hypertension)

## 2018-12-12 NOTE — DISCHARGE NOTE ADULT - CARE PLAN
Principal Discharge DX:	Hypocalcemia  Goal:	resolved  Assessment and plan of treatment:	cont calcium tablet as prescribed, follow up with PCP in 1-2 weeks  Secondary Diagnosis:	Hypokalemia  Goal:	resolved  Secondary Diagnosis:	Hypomagnesemia  Goal:	resolved  Assessment and plan of treatment:	cont Mg tablet  Secondary Diagnosis:	Acute bronchitis  Goal:	stable  Assessment and plan of treatment:	complete antibiotics and Prednisone as prescribed, follow up with Pulm in 1-2 weeks  Secondary Diagnosis:	COPD (chronic obstructive pulmonary disease) with acute bronchitis  Goal:	stable  Assessment and plan of treatment:	follow up pulm doctor  Secondary Diagnosis:	Pulmonary HTN  Goal:	controlled  Assessment and plan of treatment:	you need a stress test, follow up with your card/PCP  Secondary Diagnosis:	HTN (hypertension)  Goal:	controlled  Assessment and plan of treatment:	HCTZ is on hold, cont other home meds Principal Discharge DX:	Hypocalcemia  Goal:	resolved  Assessment and plan of treatment:	cont calcium tablet as prescribed, follow up with PCP in 1-2 weeks  Secondary Diagnosis:	Hypokalemia  Goal:	resolved  Secondary Diagnosis:	Hypomagnesemia  Goal:	resolved  Assessment and plan of treatment:	cont Mg tablet  Secondary Diagnosis:	Acute bronchitis  Goal:	stable  Assessment and plan of treatment:	complete antibiotics and Prednisone as prescribed, follow up with Pulm in 1-2 weeks, you need full PFTs, GI evaluation, Endocrine evaluation as out patient  Secondary Diagnosis:	COPD (chronic obstructive pulmonary disease) with acute bronchitis  Goal:	stable  Assessment and plan of treatment:	follow up pulm doctor  Secondary Diagnosis:	Pulmonary HTN  Goal:	controlled  Assessment and plan of treatment:	you need a stress test, follow up with your card/PCP  Secondary Diagnosis:	HTN (hypertension)  Goal:	controlled  Assessment and plan of treatment:	HCTZ is on hold, cont other home meds Principal Discharge DX:	Hypocalcemia  Goal:	resolved  Assessment and plan of treatment:	cont calcium tablet as prescribed, follow up with PCP in 1-2 weeks  Secondary Diagnosis:	Hypokalemia  Goal:	resolved  Secondary Diagnosis:	Hypomagnesemia  Goal:	resolved  Assessment and plan of treatment:	cont Mg tablet  Secondary Diagnosis:	Acute bronchitis  Goal:	stable  Assessment and plan of treatment:	complete antibiotics and Prednisone as prescribed, follow up with Pulm in 1-2 weeks, you need full PFTs, GI evaluation, Endocrine evaluation as out patient, monitor glucose and oral intake  Secondary Diagnosis:	COPD (chronic obstructive pulmonary disease) with acute bronchitis  Goal:	stable  Assessment and plan of treatment:	follow up pulm doctor  Secondary Diagnosis:	Pulmonary HTN  Goal:	controlled  Assessment and plan of treatment:	you need a stress test, follow up with your card/PCP  Secondary Diagnosis:	HTN (hypertension)  Goal:	controlled  Assessment and plan of treatment:	HCTZ is on hold, cont other home meds

## 2018-12-12 NOTE — DISCHARGE NOTE ADULT - MEDICATION SUMMARY - MEDICATIONS TO TAKE
I will START or STAY ON the medications listed below when I get home from the hospital:    Oxygen  -- Indication: For COPD (chronic obstructive pulmonary disease) with acute bronchitis    Oxygen  -- Indication: For COPD (chronic obstructive pulmonary disease) with acute bronchitis    predniSONE 10 mg oral tablet  -- 5 tab daily x 3 days, 4 tab daily x 3 days,3 tab daily x 3 days, 2 tab daily x 3 days, 1 tab daily x 3 days     -- Indication: For COPD (chronic obstructive pulmonary disease) with acute bronchitis    predniSONE 10 mg oral tablet  -- 5 tab(s) by mouth once a day x 3 days  4 tab(s) by mouth once a day x 3 days  3 tab(s) by mouth once a day x 3 days  2 tab(s) by mouth once a day x 3 days  1 tab(s) by mouth once a day x 3 days  -- Indication: For COPD (chronic obstructive pulmonary disease) with acute bronchitis    Advil 200 mg oral tablet  -- 2 tab(s) by mouth , As Needed  -- Indication: For Pain    metFORMIN 500 mg oral tablet, extended release  -- 4 tab(s) by mouth once a day, from tomorrow    *please see internal radha*  -- Indication: For T2DM (type 2 diabetes mellitus)    atorvastatin 40 mg oral tablet  -- 1 tab(s) by mouth once a day  -- Indication: For HLD    amlodipine-benazepril 5 mg-40 mg oral capsule  -- 1 cap(s) by mouth once a day  -- Indication: For HTN (hypertension)    Metoprolol Succinate  mg oral tablet, extended release  -- 1 tab(s) by mouth once a day  -- Indication: For HTN (hypertension)    Breo Ellipta 200 mcg-25 mcg/inh inhalation powder  -- 1 puff(s) inhaled once a day   -- Check with your doctor before becoming pregnant.  Expires___________________  For inhalation only.  It is very important that you take or use this exactly as directed.  Do not skip doses or discontinue unless directed by your doctor.  Obtain medical advice before taking any non-prescription drugs as some may affect the action of this medication.  Rinse mouth thoroughly after use.    -- Indication: For COPD (chronic obstructive pulmonary disease) with acute bronchitis    Incruse Ellipta 62.5 mcg/inh inhalation powder  -- 62.5 microgram(s) inhaled every 24 hours   -- Check with your doctor before becoming pregnant.  For inhalation only.  It is very important that you take or use this exactly as directed.  Do not skip doses or discontinue unless directed by your doctor.  Obtain medical advice before taking any non-prescription drugs as some may affect the action of this medication.    -- Indication: For COPD (chronic obstructive pulmonary disease) with acute bronchitis    cefuroxime 500 mg oral tablet  -- 1 tab(s) by mouth every 12 hours x 5 days   -- Indication: For COPD (chronic obstructive pulmonary disease) with acute bronchitis    magnesium oxide 400 mg (241.3 mg elemental magnesium) oral tablet  -- 1 tab(s) by mouth once a day x 5 days  -- Indication: For Hypomagnesemia    omeprazole 20 mg oral delayed release capsule  -- 1 cap(s) by mouth once a day  -- Indication: For GI PROPHYLACTIC    calcium-vitamin D 500 mg-200 intl units oral tablet  -- 1 tab(s) by mouth once a day  -- Indication: For Hypocalcemia    ergocalciferol 50,000 intl units (1.25 mg) oral capsule  -- 1 cap(s) by mouth once a month  -- Indication: For SUPPLEMENT

## 2018-12-12 NOTE — PROGRESS NOTE ADULT - SUBJECTIVE AND OBJECTIVE BOX
INTERVAL HPI/OVERNIGHT EVENTS:  Pt seen and examined at bedside.     Allergies/Intolerance: No Known Allergies      MEDICATIONS  (STANDING):  amLODIPine   Tablet 5 milliGRAM(s) Oral daily  atorvastatin 40 milliGRAM(s) Oral at bedtime  calcium carbonate 1250 mG  + Vitamin D (OsCal 500 + D) 1 Tablet(s) Oral three times a day  influenza   Vaccine 0.5 milliLiter(s) IntraMuscular once  lisinopril 40 milliGRAM(s) Oral daily  metoprolol succinate  milliGRAM(s) Oral daily  pantoprazole    Tablet 40 milliGRAM(s) Oral before breakfast  potassium chloride    Tablet ER 20 milliEquivalent(s) Oral four times a day  sodium chloride 0.9% with potassium chloride 20 mEq/L 1000 milliLiter(s) (100 mL/Hr) IV Continuous <Continuous>    MEDICATIONS  (PRN):  aluminum hydroxide/magnesium hydroxide/simethicone Suspension 30 milliLiter(s) Oral every 4 hours PRN Dyspepsia        ROS: all systems reviewed and wnl      PHYSICAL EXAMINATION:  Vital Signs Last 24 Hrs  T(C): 36.6 (12 Dec 2018 04:56), Max: 36.8 (11 Dec 2018 10:50)  T(F): 97.9 (12 Dec 2018 04:56), Max: 98.2 (11 Dec 2018 10:50)  HR: 63 (12 Dec 2018 04:56) (59 - 75)  BP: 140/71 (12 Dec 2018 04:56) (129/77 - 140/71)  BP(mean): --  RR: 18 (12 Dec 2018 04:56) (18 - 18)  SpO2: 95% (12 Dec 2018 04:56) (90% - 99%)  CAPILLARY BLOOD GLUCOSE      POCT Blood Glucose.: 171 mg/dL (11 Dec 2018 22:02)        GENERAL:   NECK: supple, No JVD  CHEST/LUNG: clear to auscultation bilaterally; no rales, rhonchi, or wheezing b/l  HEART: normal S1, S2  ABDOMEN: BS+, soft, ND, NT   EXTREMITIES:  pulses palpable; no clubbing, cyanosis, or edema b/l LEs  SKIN: no rashes or lesions      LABS:                        12.8   13.7  )-----------( 353      ( 11 Dec 2018 11:16 )             38.3     12-12    140  |  104  |  22  ----------------------------<  95  3.7   |  21<L>  |  0.93    Ca    7.0<L>      12 Dec 2018 06:43  Mg     1.0     12-12    TPro  6.9  /  Alb  3.4  /  TBili  0.5  /  DBili  0.1  /  AST  19  /  ALT  13  /  AlkPhos  75  12-12    PT/INR - ( 11 Dec 2018 11:47 )   PT: 18.3 sec;   INR: 1.58 ratio         PTT - ( 11 Dec 2018 11:47 )  PTT:27.6 sec INTERVAL HPI/OVERNIGHT EVENTS:  Pt seen and examined at bedside.     Allergies/Intolerance: No Known Allergies      MEDICATIONS  (STANDING):  amLODIPine   Tablet 5 milliGRAM(s) Oral daily  atorvastatin 40 milliGRAM(s) Oral at bedtime  calcium carbonate 1250 mG  + Vitamin D (OsCal 500 + D) 1 Tablet(s) Oral three times a day  influenza   Vaccine 0.5 milliLiter(s) IntraMuscular once  lisinopril 40 milliGRAM(s) Oral daily  metoprolol succinate  milliGRAM(s) Oral daily  pantoprazole    Tablet 40 milliGRAM(s) Oral before breakfast  potassium chloride    Tablet ER 20 milliEquivalent(s) Oral four times a day  sodium chloride 0.9% with potassium chloride 20 mEq/L 1000 milliLiter(s) (100 mL/Hr) IV Continuous <Continuous>    MEDICATIONS  (PRN):  aluminum hydroxide/magnesium hydroxide/simethicone Suspension 30 milliLiter(s) Oral every 4 hours PRN Dyspepsia        ROS: all systems reviewed and wnl      PHYSICAL EXAMINATION:  Vital Signs Last 24 Hrs  T(C): 36.6 (12 Dec 2018 04:56), Max: 36.8 (11 Dec 2018 10:50)  T(F): 97.9 (12 Dec 2018 04:56), Max: 98.2 (11 Dec 2018 10:50)  HR: 63 (12 Dec 2018 04:56) (59 - 75)  BP: 140/71 (12 Dec 2018 04:56) (129/77 - 140/71)  BP(mean): --  RR: 18 (12 Dec 2018 04:56) (18 - 18)  SpO2: 95% (12 Dec 2018 04:56) (90% - 99%)  CAPILLARY BLOOD GLUCOSE      POCT Blood Glucose.: 171 mg/dL (11 Dec 2018 22:02)        GENERAL: stable in chair, no fevers, SOB or CP  NECK: supple, No JVD  CHEST/LUNG: clear to auscultation bilaterally; no rales, rhonchi, or wheezing b/l  HEART: normal S1, S2  ABDOMEN: BS+, soft, ND, NT   EXTREMITIES:  pulses palpable; no clubbing, cyanosis, or edema b/l LEs  SKIN: no rashes or lesions      LABS:                        12.8   13.7  )-----------( 353      ( 11 Dec 2018 11:16 )             38.3     12-12    140  |  104  |  22  ----------------------------<  95  3.7   |  21<L>  |  0.93    Ca    7.0<L>      12 Dec 2018 06:43  Mg     1.0     12-12    TPro  6.9  /  Alb  3.4  /  TBili  0.5  /  DBili  0.1  /  AST  19  /  ALT  13  /  AlkPhos  75  12-12    PT/INR - ( 11 Dec 2018 11:47 )   PT: 18.3 sec;   INR: 1.58 ratio         PTT - ( 11 Dec 2018 11:47 )  PTT:27.6 sec

## 2018-12-12 NOTE — PROGRESS NOTE ADULT - ASSESSMENT
73 year old female PMH HTN, HLD, DM(II), presents with lab results showing hypokalemia and hypocalcemia. Was here last week for URI symptoms, which persisted so went to PMD on Monday. Prescribed Cefuroxime and Zofran for nausea, and was found to have abnormal lab results today. Referred to the ER for eval and admit.  Admits to nausea, headache, general malaise and fatigue. Cough has improved. No other complaints.  In ER mild hypoxia noted.     CV: Stop HCTZ given hypokalemia. BP stable on arrival 137/82.   Continue Toprol  mg/day, Norvasc 5 mg/day and Lisinopril 40 mg/day.   Cardiology requests TTE with bubble study to r/o right to left shunt  as potential cause of hypoxemia.      Renal: Replace KCL 20 meq QID. Magnesium level low 1.0. Oral Magnesium 400 mg/day given   for 5 days along with one dose of Magsulfate 2 grams IVSS x 1. Elevated anion gap likely from dehydration   now normalized. IVF normal saline decreased to 75/h with 20 KCL. Calcium improved to 7.0 from 6.3. Will replace OSCAL tid for now.   Recheck all lytes in AM.     Endo: Hold Metformen while in hospital and with increased anion gap acidosis.   Finger sticks AC and HS with SSC.     GI: Continue Prilosec 40 mg/day with Maalox prn.     COPD: CTA notes no PE or effusions. Mild centolobular emphysema noted.   No wheeze on exam. No treatment for now but does explain low oxygen sats in the ER.       Bedside spirometry ordered to r/o COPD. Will need outpatient PFT.

## 2018-12-12 NOTE — ED ADULT TRIAGE NOTE - NS ED NOTE AC HIGH RISK COUNTRIES
No Eyes with no visual disturbances.  Ears clean and dry and no hearing difficulties. Nose with pink mucosa and no drainage.  Mouth mucous membranes moist and pink.  No tenderness or swelling to throat or neck.

## 2018-12-12 NOTE — PROVIDER CONTACT NOTE (CRITICAL VALUE NOTIFICATION) - RECOMMENDATIONS
calcium carbonate 1250 mG  + Vitamin D (OsCal 500 + D) given as ordered, potassium chloride    Tablet ER given as ordered, will follow up with AM labs

## 2018-12-12 NOTE — PROGRESS NOTE ADULT - SUBJECTIVE AND OBJECTIVE BOX
CARDIOLOGY     PROGRESS  NOTE   ________________________________________________    CHIEF COMPLAINT:Patient is a 73y old  Female who presents with a chief complaint of Hypokalemia and hypocalcemia (12 Dec 2018 07:46)  doing well.  	  REVIEW OF SYSTEMS:  CONSTITUTIONAL: No fever, weight loss, or fatigue  EYES: No eye pain, visual disturbances, or discharge  ENT:  No difficulty hearing, tinnitus, vertigo; No sinus or throat pain  NECK: No pain or stiffness  RESPIRATORY: No cough, wheezing, chills or hemoptysis; No Shortness of Breath  CARDIOVASCULAR: No chest pain, palpitations, passing out, dizziness, or leg swelling  GASTROINTESTINAL: No abdominal or epigastric pain. No nausea, vomiting, or hematemesis; No diarrhea or constipation. No melena or hematochezia.  GENITOURINARY: No dysuria, frequency, hematuria, or incontinence  NEUROLOGICAL: No headaches, memory loss, loss of strength, numbness, or tremors  SKIN: No itching, burning, rashes, or lesions   LYMPH Nodes: No enlarged glands  ENDOCRINE: No heat or cold intolerance; No hair loss  MUSCULOSKELETAL: No joint pain or swelling; No muscle, back, or extremity pain  PSYCHIATRIC: No depression, anxiety, mood swings, or difficulty sleeping  HEME/LYMPH: No easy bruising, or bleeding gums  ALLERGY AND IMMUNOLOGIC: No hives or eczema	    [ ] All others negative	  [ ] Unable to obtain    PHYSICAL EXAM:  T(C): 36.6 (12-12-18 @ 04:56), Max: 36.8 (12-11-18 @ 10:50)  HR: 63 (12-12-18 @ 04:56) (59 - 75)  BP: 140/71 (12-12-18 @ 04:56) (129/77 - 140/71)  RR: 18 (12-12-18 @ 04:56) (18 - 18)  SpO2: 95% (12-12-18 @ 04:56) (90% - 99%)  Wt(kg): --  I&O's Summary    12 Dec 2018 07:01  -  12 Dec 2018 08:26  --------------------------------------------------------  IN: 50 mL / OUT: 0 mL / NET: 50 mL        Appearance: Normal	  HEENT:   Normal oral mucosa, PERRL, EOMI	  Lymphatic: No lymphadenopathy  Cardiovascular: Normal S1 S2, No JVD, + murmurs, No edema  Respiratory: Lungs clear to auscultation	  Psychiatry: A & O x 3, Mood & affect appropriate  Gastrointestinal:  Soft, Non-tender, + BS	  Skin: No rashes, No ecchymoses, No cyanosis	  Neurologic: Non-focal  Extremities: Normal range of motion, No clubbing, cyanosis or edema  Vascular: Peripheral pulses palpable 2+ bilaterally    MEDICATIONS  (STANDING):  amLODIPine   Tablet 5 milliGRAM(s) Oral daily  atorvastatin 40 milliGRAM(s) Oral at bedtime  calcium carbonate 1250 mG  + Vitamin D (OsCal 500 + D) 1 Tablet(s) Oral three times a day  influenza   Vaccine 0.5 milliLiter(s) IntraMuscular once  lisinopril 40 milliGRAM(s) Oral daily  metoprolol succinate  milliGRAM(s) Oral daily  pantoprazole    Tablet 40 milliGRAM(s) Oral before breakfast  potassium chloride    Tablet ER 20 milliEquivalent(s) Oral four times a day  sodium chloride 0.9% with potassium chloride 20 mEq/L 1000 milliLiter(s) (100 mL/Hr) IV Continuous <Continuous>      TELEMETRY: 	    ECG:  	  RADIOLOGY:  OTHER: 	  	  LABS:	 	    CARDIAC MARKERS:      Calcium, Total Serum: 7.0 mg/dL (12.12.18 @ 06:43)                              11.3   10.10 )-----------( 315      ( 12 Dec 2018 07:53 )             34.7     12-12    140  |  104  |  22  ----------------------------<  95  3.7   |  21<L>  |  0.93    Ca    7.0<L>      12 Dec 2018 06:43  Mg     1.0     12-12    TPro  6.9  /  Alb  3.4  /  TBili  0.5  /  DBili  0.1  /  AST  19  /  ALT  13  /  AlkPhos  75  12-12    proBNP:   Lipid Profile:   HgA1c:   TSH:   PT/INR - ( 11 Dec 2018 11:47 )   PT: 18.3 sec;   INR: 1.58 ratio         PTT - ( 11 Dec 2018 11:47 )  PTT:27.6 sec    Albumin, Serum: 3.4 g/dL (12.12.18 @ 06:43)      Assessment and plan  ---------------------------  check o2 sat in RA  will discuss with dr logan regarding recent cardiac work up  hypocalcemia work up  dvt prophylaxis  will adjust bp meds

## 2018-12-12 NOTE — DISCHARGE NOTE ADULT - HOSPITAL COURSE
73 year old female PMH HTN, HLD, DM(II), presents with lab results showing hypokalemia and hypocalcemia. Was here last week for URI symptoms, which persisted so went to PMD on Monday. Prescribed Cefuroxime and Zofran for nausea, and was found to have abnormal lab results today. Referred to the ER for eval and admit.  Admits to nausea, headache, general malaise and fatigue. Cough has improved. No other complaints.  In ER mild hypoxia noted. Also admits to limited oral intake for past few days. Electrolytes were replenished, will cont po supplement. She was started with a course of Oral Ceftin, Prednisone taper, nasal cannula oxygen. She was seen by card, pulm and per recomm she will be discharged with breo ellipta 200/25 mcg, 1 a day, incruse ellipta 62.5 mcg 1 a day. She needs op endocrine, GI and full PFTs. She is hemodynamically stable to be discharged home today. Spoke to Attending. 73 year old female PMH HTN, HLD, DM(II), presents with lab results showing hypokalemia and hypocalcemia. Was here last week for URI symptoms, which persisted so went to PMD on Monday. Prescribed Cefuroxime and Zofran for nausea, and was found to have abnormal lab results today. Referred to the ER for eval and admit.  Admits to nausea, headache, general malaise and fatigue. Cough has improved. No other complaints.    In ER mild hypoxia noted on arrival. Also admits to limited oral intake for past few days. HCTZ was stopped. Electrolytes were replenished, will cont po supplement. Had elevated anion gap metabolic acidosis from   dehydration. This corrected with IVF. Potasium, phosphorus, magnesium and calcium were all low and replaced. She was started with a course of Oral Ceftin, Prednisone taper, nasal cannula oxygen. CXR was clear.   CTA noted no effusions, no PE moderate centolobular emphysema. Large left hiatal hernia was seen. TTE showed normal LV function with EF 66 %, negative bubble study for patent foramen ovale.  Mild pulmonary HTN  was noted.     Was seen by card, pulm and per recomm she will be discharged with breo ellipta 200/25 mcg once per day and brief prednisone taper. Was approved for home oxygen. She needs full PFT's as outpatient. She is hemodynamically stable to be discharged home today. See med list. 73 year old female PMH HTN, HLD, DM(II), presents with lab results showing hypokalemia and hypocalcemia. Was here last week for URI symptoms, which persisted so went to PMD on Monday. Prescribed Cefuroxime and Zofran for nausea, and was found to have abnormal lab results today. Referred to the ER for eval and admit.  Admits to nausea, headache, general malaise and fatigue. Cough has improved. No other complaints.    In ER mild hypoxia noted on arrival. Also admits to limited oral intake for past few days. HCTZ was stopped. Electrolytes were replenished, will cont po supplement. Had elevated anion gap metabolic acidosis from dehydration. This corrected with IVF. Potasium, phosphorus, magnesium and calcium were all low and replaced. She was started with a course of Oral Ceftin, Prednisone taper, nasal cannula oxygen. CXR was clear. CTA noted no effusions, no PE,  moderate centolobular emphysema. Large left hiatal hernia was seen. TTE showed normal LV function with EF 66 %, negative bubble study for patent foramen ovale.  Mild pulmonary HTN was noted.     Was seen by card, pulm and per recomm she will be discharged with breo ellipta 200/25 mcg once per day and brief prednisone taper. Was approved for home oxygen. She needs full PFT's as outpatient. She is hemodynamically stable to be discharged home today. See med list.

## 2018-12-12 NOTE — DISCHARGE NOTE ADULT - CARE PROVIDER_API CALL
Bharat Mock), Cardiovascular Disease; Internal Medicine  [address] [place] [address]  Phone: [phone]  Fax: [phone]    Larissa Fields), Critical Care Medicine; Internal Medicine; Pulmonary Disease  [address] [place] [id number]  Phone: [phone]  Fax: [phone]

## 2018-12-12 NOTE — DISCHARGE NOTE ADULT - PLAN OF CARE
resolved cont calcium tablet as prescribed, follow up with PCP in 1-2 weeks cont Mg tablet stable complete antibiotics and Prednisone as prescribed, follow up with Pulm in 1-2 weeks follow up pulm doctor controlled you need a stress test, follow up with your card/PCP HCTZ is on hold, cont other home meds complete antibiotics and Prednisone as prescribed, follow up with Pulm in 1-2 weeks, you need full PFTs, GI evaluation, Endocrine evaluation as out patient complete antibiotics and Prednisone as prescribed, follow up with Pulm in 1-2 weeks, you need full PFTs, GI evaluation, Endocrine evaluation as out patient, monitor glucose and oral intake

## 2018-12-12 NOTE — DISCHARGE NOTE ADULT - PATIENT PORTAL LINK FT
You can access the [facility], offered by [facility], by registering with the following website: [email or web address]

## 2018-12-13 LAB
ANION GAP SERPL CALC-SCNC: 10 MMOL/L — SIGNIFICANT CHANGE UP (ref 5–17)
BUN SERPL-MCNC: 18 MG/DL — SIGNIFICANT CHANGE UP (ref 7–23)
CALCIUM SERPL-MCNC: 7.5 MG/DL — LOW (ref 8.4–10.5)
CHLORIDE SERPL-SCNC: 108 MMOL/L — SIGNIFICANT CHANGE UP (ref 96–108)
CO2 SERPL-SCNC: 19 MMOL/L — LOW (ref 22–31)
CREAT SERPL-MCNC: 0.8 MG/DL — SIGNIFICANT CHANGE UP (ref 0.5–1.3)
GLUCOSE BLDC GLUCOMTR-MCNC: 134 MG/DL — HIGH (ref 70–99)
GLUCOSE BLDC GLUCOMTR-MCNC: 152 MG/DL — HIGH (ref 70–99)
GLUCOSE BLDC GLUCOMTR-MCNC: 162 MG/DL — HIGH (ref 70–99)
GLUCOSE SERPL-MCNC: 128 MG/DL — HIGH (ref 70–99)
HBA1C BLD-MCNC: 7.7 % — HIGH (ref 4–5.6)
MAGNESIUM SERPL-MCNC: 1.4 MG/DL — LOW (ref 1.6–2.6)
PHOSPHATE SERPL-MCNC: 1.2 MG/DL — LOW (ref 2.5–4.5)
POTASSIUM SERPL-MCNC: 4.9 MMOL/L — SIGNIFICANT CHANGE UP (ref 3.5–5.3)
POTASSIUM SERPL-SCNC: 4.9 MMOL/L — SIGNIFICANT CHANGE UP (ref 3.5–5.3)
SODIUM SERPL-SCNC: 137 MMOL/L — SIGNIFICANT CHANGE UP (ref 135–145)
TSH SERPL-MCNC: 0.44 UIU/ML — SIGNIFICANT CHANGE UP (ref 0.27–4.2)

## 2018-12-13 RX ORDER — CEFUROXIME AXETIL 250 MG
500 TABLET ORAL EVERY 12 HOURS
Qty: 0 | Refills: 0 | Status: DISCONTINUED | OUTPATIENT
Start: 2018-12-13 | End: 2018-12-14

## 2018-12-13 RX ORDER — SODIUM,POTASSIUM PHOSPHATES 278-250MG
1 POWDER IN PACKET (EA) ORAL
Qty: 0 | Refills: 0 | Status: DISCONTINUED | OUTPATIENT
Start: 2018-12-13 | End: 2018-12-13

## 2018-12-13 RX ORDER — POTASSIUM PHOSPHATE, MONOBASIC POTASSIUM PHOSPHATE, DIBASIC 236; 224 MG/ML; MG/ML
15 INJECTION, SOLUTION INTRAVENOUS ONCE
Qty: 0 | Refills: 0 | Status: COMPLETED | OUTPATIENT
Start: 2018-12-13 | End: 2018-12-13

## 2018-12-13 RX ORDER — BUDESONIDE, MICRONIZED 100 %
0.5 POWDER (GRAM) MISCELLANEOUS EVERY 12 HOURS
Qty: 0 | Refills: 0 | Status: DISCONTINUED | OUTPATIENT
Start: 2018-12-13 | End: 2018-12-14

## 2018-12-13 RX ORDER — IPRATROPIUM/ALBUTEROL SULFATE 18-103MCG
3 AEROSOL WITH ADAPTER (GRAM) INHALATION EVERY 6 HOURS
Qty: 0 | Refills: 0 | Status: DISCONTINUED | OUTPATIENT
Start: 2018-12-13 | End: 2018-12-14

## 2018-12-13 RX ORDER — MAGNESIUM SULFATE 500 MG/ML
2 VIAL (ML) INJECTION ONCE
Qty: 0 | Refills: 0 | Status: COMPLETED | OUTPATIENT
Start: 2018-12-13 | End: 2018-12-13

## 2018-12-13 RX ORDER — SODIUM,POTASSIUM PHOSPHATES 278-250MG
1 POWDER IN PACKET (EA) ORAL
Qty: 0 | Refills: 0 | Status: DISCONTINUED | OUTPATIENT
Start: 2018-12-13 | End: 2018-12-14

## 2018-12-13 RX ADMIN — Medication 0.5 MILLIGRAM(S): at 18:11

## 2018-12-13 RX ADMIN — POTASSIUM PHOSPHATE, MONOBASIC POTASSIUM PHOSPHATE, DIBASIC 62.5 MILLIMOLE(S): 236; 224 INJECTION, SOLUTION INTRAVENOUS at 17:30

## 2018-12-13 RX ADMIN — Medication 3 MILLILITER(S): at 21:26

## 2018-12-13 RX ADMIN — Medication 1 PACKET(S): at 20:21

## 2018-12-13 RX ADMIN — Medication 1 TABLET(S): at 15:02

## 2018-12-13 RX ADMIN — Medication 500 MILLIGRAM(S): at 18:12

## 2018-12-13 RX ADMIN — Medication 3 MILLILITER(S): at 15:14

## 2018-12-13 RX ADMIN — Medication 200 MILLIGRAM(S): at 05:10

## 2018-12-13 RX ADMIN — Medication 1 TABLET(S): at 21:26

## 2018-12-13 RX ADMIN — PANTOPRAZOLE SODIUM 40 MILLIGRAM(S): 20 TABLET, DELAYED RELEASE ORAL at 06:21

## 2018-12-13 RX ADMIN — Medication 1 TABLET(S): at 05:10

## 2018-12-13 RX ADMIN — LISINOPRIL 40 MILLIGRAM(S): 2.5 TABLET ORAL at 05:10

## 2018-12-13 RX ADMIN — Medication 50 MILLIGRAM(S): at 16:21

## 2018-12-13 RX ADMIN — AMLODIPINE BESYLATE 5 MILLIGRAM(S): 2.5 TABLET ORAL at 05:10

## 2018-12-13 RX ADMIN — MAGNESIUM OXIDE 400 MG ORAL TABLET 400 MILLIGRAM(S): 241.3 TABLET ORAL at 13:23

## 2018-12-13 RX ADMIN — Medication 50 GRAM(S): at 13:10

## 2018-12-13 RX ADMIN — ATORVASTATIN CALCIUM 40 MILLIGRAM(S): 80 TABLET, FILM COATED ORAL at 21:26

## 2018-12-13 RX ADMIN — Medication 20 MILLIEQUIVALENT(S): at 05:10

## 2018-12-13 NOTE — CONSULT NOTE ADULT - ASSESSMENT
ASSESSMENT:    multifactorial hypoxic respiratory failure    1) moderate obstructive lung disease - suspect severe reduction on diffusing capacity given severe emphysema on chest CT  2) restrictive lung disease due to a very large hiatal hernia compressing the left lung and obesity    moderate pulmonary hypertension due to chronic hypoxemia    PLAN/RECOMMENDATIONS:    patient requires ATC oxygen supplementation to keep saturation greater than 88% - she is hesitant but willing to receive home oxygen suppleies  albuterol/atrovent/pulmicort nebs - discharge on breo ellipta 200/25mcg 1 inhalation daily and incruse ellipta 62.5mcg 1 inhalation daily  prednisone 50mg daily x 5 days  ceftin 500mg 2 times daily x 5 days  cardiology follow-up noted  cardiac meds: lipitor/norvasc/lisinopril/toprol XL  replete lytes which are abnormal for unclear reasons  consider endocrine evaluation    Thank you for the courtesy of this referral. Plan of care and radiographs reviewed with the patient and her  at bedside and the dedicated floor NP.    Phong Iglesias MD, [facility] - [phone]  Pulmonary Medicine ASSESSMENT:    multifactorial hypoxic respiratory failure:    1) moderate obstructive lung disease - suspect severe reduction in diffusing capacity given severe emphysema on chest CT  2) restrictive lung disease due to a very large hiatal hernia compressing the left lung and obesity    moderate pulmonary hypertension due to chronic hypoxemia    PLAN/RECOMMENDATIONS:    patient requires ATC oxygen supplementation to keep saturation greater than 88% - she is hesitant but willing to receive home oxygen supplies  albuterol/atrovent/pulmicort nebs - discharge on breo ellipta 200/25mcg 1 inhalation daily and incruse ellipta 62.5mcg 1 inhalation daily  prednisone 50mg daily x 5 days  ceftin 500mg 2 times daily x 5 days  cardiology follow-up noted  cardiac meds: lipitor/norvasc/lisinopril/toprol XL  replete lytes which are abnormal for unclear reasons  consider endocrine evaluation  outpatient full PFTs    Thank you for the courtesy of this referral. Plan of care and radiographs reviewed with the patient and her  at bedside, the dedicated floor NP and Dr. Osborne. Will arrange follow-up in my office.    Phong Iglesias MD, [facility] - [phone]  Pulmonary Medicine ASSESSMENT:    multifactorial hypoxic respiratory failure:    1) moderate obstructive lung disease - suspect severe reduction in diffusing capacity given severe emphysema on chest CT  2) restrictive lung disease due to a very large hiatal hernia compressing the left lung and obesity    moderate pulmonary hypertension due to chronic hypoxemia    PLAN/RECOMMENDATIONS:    patient requires ATC oxygen supplementation to keep saturation greater than 88% - she is hesitant but willing to receive home oxygen supplies  albuterol/atrovent/pulmicort nebs - discharge on breo ellipta 200/25mcg 1 inhalation daily and incruse ellipta 62.5mcg 1 inhalation daily  prednisone 50mg daily x 5 days  ceftin 500mg 2 times daily x 5 days  cardiology follow-up noted  cardiac meds: lipitor/norvasc/lisinopril/toprol XL  replete lytes which are abnormal for unclear reasons  consider endocrine evaluation  outpatient full PFTs  GI evaluation in the outpatient setting - the patient's pulmonary function and reflux symptoms would both likely improve with surgical correction of her very large hiatal hernia    Thank you for the courtesy of this referral. Plan of care and radiographs reviewed with the patient and her  at bedside. Case discussed with the dedicated floor NP and Dr. Osborne. Will arrange follow-up in my office with Dr. Larissa Fiedls.    Phong Iglesias MD, [facility] - [phone]  Pulmonary Medicine ASSESSMENT:    multifactorial hypoxic respiratory failure:    1) moderate obstructive lung disease - suspect severe reduction in diffusing capacity given severe emphysema on chest CT  2) restrictive lung disease due to obesity and a very large hiatal hernia compressing the left lung     moderate pulmonary hypertension due to chronic hypoxemia    PLAN/RECOMMENDATIONS:    patient requires ATC oxygen supplementation to keep saturation greater than 88% - she is hesitant but willing to receive home oxygen supplies - please document room air saturation at rest and with exertion and repeat saturation with ambulation on 2lpm nasal canula  albuterol/atrovent/pulmicort nebs - discharge on breo ellipta 200/25mcg 1 inhalation daily and incruse ellipta 62.5mcg 1 inhalation daily  prednisone 50mg daily x 5 days  ceftin 500mg 2 times daily x 5 days  cardiology follow-up noted  cardiac meds: lipitor/norvasc/lisinopril/toprol XL  replete lytes which are abnormal for unclear reasons  consider endocrine evaluation  outpatient full PFTs  GI evaluation in the outpatient setting - the patient's pulmonary function and reflux symptoms would both both improve with surgical correction of her very large hiatal hernia    Thank you for the courtesy of this referral. Plan of care and radiographs reviewed with the patient and her  at bedside. Case discussed with the dedicated floor NP and Dr. Osborne. Will arrange follow-up in my office with Dr. Larissa Fields.    Phong Iglesias MD, [facility] - [phone]  Pulmonary Medicine

## 2018-12-13 NOTE — CONSULT NOTE ADULT - SUBJECTIVE AND OBJECTIVE BOX
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 A recent ECHO revealed a preserved LVEF, no significant valvular heart disease and moderate pulmonary hypertension. The patient developed a URI on 12/10 and was treated with Cefuroxime and given Zofran for nausea. She was seen in follow-up on 12/11 and found to have multiple electrolyte abnormalities and sent to the ER for further evaluation and treatment. The patient described nausea, headache fatigue and malaise when admitted and was found to be mildly hypoxic on room air. She has no further cough, sputum production, chest congestion or wheeze. She has no fevers, chills or sweats. No chest pain/pressure or palpitations. She was found to have an abnormal chest CT. Asked to evaluate    PMHX:  HTN (hypertension)  HLD (hyperlipidemia)  DM (diabetes mellitus)        PSHX:  No significant past surgical history      FAMILY HISTORY:  No pertinent family history in first degree relatives      SOCIAL HISTORY:    Pulmonary Medications:       Antimicrobials:      Cardiology:  amLODIPine   Tablet 5 milliGRAM(s) Oral daily  lisinopril 40 milliGRAM(s) Oral daily  metoprolol succinate  milliGRAM(s) Oral daily      Other:  aluminum hydroxide/magnesium hydroxide/simethicone Suspension 30 milliLiter(s) Oral every 4 hours PRN  atorvastatin 40 milliGRAM(s) Oral at bedtime  calcium carbonate 1250 mG  + Vitamin D (OsCal 500 + D) 1 Tablet(s) Oral three times a day  influenza   Vaccine 0.5 milliLiter(s) IntraMuscular once  magnesium oxide 400 milliGRAM(s) Oral daily  pantoprazole    Tablet 40 milliGRAM(s) Oral before breakfast  potassium acid phosphate/sodium acid phosphate tablet (K-PHOS No. 2) 1 Tablet(s) Oral four times a day with meals      Allergies    No Known Allergies    HOME MEDICATIONS: see  H & P    REVIEW OF SYSTEMS:  Constitutional: As per HPI  HEENT: Within normal limits  CV: As per HPI  Resp: As per HPI  GI: Within normal limits   : Within normal limits  Musculoskeletal: Within normal limits  Skin: Within normal limits  Neurological: Within normal limits  Psychiatric: Within normal limits  Endocrine: Within normal limits  Hematologic/Lymphatic: Within normal limits  Allergic/Immunologic: Within normal limits    [x] All other systems negative    OBJECTIVE:    I&O's Detail    12 Dec 2018 07:01  -  13 Dec 2018 07:00  --------------------------------------------------------  IN:    IV PiggyBack: 50 mL    Oral Fluid: 1080 mL    sodium chloride 0.9% with potassium chloride 20 mEq/L: 1800 mL  Total IN: 2930 mL    OUT:  Total OUT: 0 mL    Total NET: 2930 mL      13 Dec 2018 07:01  -  13 Dec 2018 12:15  --------------------------------------------------------  IN:    Oral Fluid: 400 mL  Total IN: 400 mL    OUT:  Total OUT: 0 mL    Total NET: 400 mL    POCT Blood Glucose.: 162 mg/dL (13 Dec 2018 08:16)  POCT Blood Glucose.: 117 mg/dL (12 Dec 2018 21:52)  POCT Blood Glucose.: 155 mg/dL (12 Dec 2018 17:12)  POCT Blood Glucose.: 124 mg/dL (12 Dec 2018 12:21)      PHYSICAL EXAM:  ICU Vital Signs Last 24 Hrs  T(C): 36.8 (13 Dec 2018 04:42), Max: 37.1 (12 Dec 2018 12:34)  T(F): 98.2 (13 Dec 2018 04:42), Max: 98.7 (12 Dec 2018 12:34)  HR: 86 (13 Dec 2018 04:42) (68 - 86)  BP: 143/87 (13 Dec 2018 04:42) (105/69 - 143/87)  BP(mean): --  ABP: --  ABP(mean): --  RR: 18 (13 Dec 2018 04:42) (17 - 94)  SpO2: 86% (13 Dec 2018 10:14) (86% - 95%)    General: Awake. Alert. Cooperative. No distress. Appears stated age 	  HEENT:   Atraumatic. Normocephalic. Anicteric. Normal oral mucosa. PERRL. EOMI.  Neck: Supple. Trachea midline. Thyroid without enlargement/tenderness/nodules. No carotid bruit. No JVD.	  Cardiovascular: Regular rate and rhythm. S1 S2 normal. No murmurs, rubs or gallops.  Respiratory: Respirations unlabored. Clear to auscultation and percussion bilaterally. No curvature.  Abdomen: Soft. Non-tender. Non-distended. No organomegaly. No masses. Normal bowel sounds.  Extremities: Warm to touch. No clubbing or cyanosis. No pedal edema.  Pulses: 2+ peripheral pulses all extremities.	  Skin: Normal skin color. No rashes or lesions. No ecchymoses. No cyanosis. Warm to touch.  Lymph Nodes: Cervical, supraclavicular and axillary nodes normal  Neurological: Motor and sensory examination equal and normal. A and O x 3  Psychiatry: Appropriate mood and affect.      LABS:                          11.3   10.10 )-----------( 315      ( 12 Dec 2018 07:53 )             34.7     12-13    137  |  108  |  18  ----------------------------<  128<H>  4.9   |  19<L>  |  0.80    12-12    140  |  104  |  22  ----------------------------<  95  3.7   |  21<L>  |  0.93    Ca      7.5<L>      12-13    Ca      7.0<L>      12-12    Phos    1.2     12-13      Mg       1.4     12-13    Mg       1.0     12-12    TPro  6.9  /  Alb  3.4  /  TBili  0.5  /  DBili  0.1  /  AST  19  /  ALT  13  /  AlkPhos  75  12-12    TPro  7.9  /  Alb  3.8  /  TBili  0.6  /  DBili  x   /  AST  29  /  ALT  15  /  AlkPhos  82  12-11    < from: Transthoracic Echocardiogram (12.12.18 @ 15:46) >    Patient name: GOLDBERG, EDITH  YOB: 1945   Age: 73 (F)   MR#: [id number]  Study Date: 12/12/2018  Location: [address] [place], [place] [id number]: Elvi Condon RDCS  Study quality: Technically good  Referring Physician: Luis Alberto Bania, MD  Blood Pressure: 99/60 mmHg  Height: 163 cm  Weight: 73 kg  BSA: 1.8 m2  ------------------------------------------------------------------------  PROCEDURE: Transthoracic echocardiogram with 2-D, M-Mode  and complete spectral and color flow Doppler. Patient was  injected with 10 cc's of aerosolized saline. Patient was  injected with 10 cc's of aerosolized saline.  INDICATION: Dyspnea, unspecified (R06.00)  ------------------------------------------------------------------------  Dimensions:    Normal Values:  LA:     2.4    2.0 - 4.0 cm  Ao:     2.7    2.0 - 3.8 cm  SEPTUM: 0.7    0.6 - 1.2 cm  PWT:    0.7    0.6 - 1.1 cm  LVIDd:  3.9    3.0 - 5.6 cm  LVIDs:  2.5    1.8 - 4.0 cm  Derived variables:  LVMI: 42 g/m2  RWT: 0.35  Fractional short: 36 %  EF (Teicholtz): 66 %  Doppler Peak Velocity (m/sec): AoV=1.8 PV=0.8  ------------------------------------------------------------------------  Observations:  Mitral Valve: Normal mitral valve. Minimal mitral  regurgitation.  Aortic Valve/Aorta: Normal trileaflet aortic valve. Peak  transaortic valve gradient equals 13 mm Hg, consistent with  mild aortic stenosis. Peak left ventricular outflow tract  gradient equals 6 mm Hg, mean gradient is equal to 4 mm Hg,  LVOT velocity time integral equals 29 cm.  Aortic Root: 2.7 cm.  LVOT diameter: 2 cm.  Left Atrium: Normal left atrium.  LA volume index = 17  cc/m2.  Left Ventricle: Normal left ventricular systolic function.  No segmental wall motion abnormalities. Normal left  ventricular internal dimensions and wall thicknesses.  Right Heart: Normal right atrium. Normal right ventricular  size and function. Normal tricuspid valve. Normal pulmonic  valve.  Pericardium/Pleura: Normal pericardium with no pericardial  effusion.  Hemodynamic: Estimated right atrial pressure is 8 mm Hg.  Estimated right ventricular systolic pressure equals 49 mm  Hg, assuming right atrial pressure equals 8 mm Hg,  consistent with mild pulmonary hypertension. Agitated  saline injection demonstrates no evidence of a patent  foramen ovale.  ------------------------------------------------------------------------  Conclusions:  1. Normal mitral valve. Minimal mitral regurgitation.  2. Normal trileaflet aortic valve.  3. Normal left ventricular internal dimensions and wall  thicknesses.  4. Normal left ventricular systolic function. No segmental  wall motion abnormalities.  5. Normal right ventricular size and function.  6. Estimated right ventricular systolic pressure equals 49  mm Hg, assuming right atrial pressure equals 8 mm Hg,  consistent with mild pulmonary hypertension.  7. Agitated saline injection demonstrates no evidence of a  patent foramen ovale.  *** No previous Echo exam.  ------------------------------------------------------------------------  Confirmed on  12/12/2018 - 18:22:37 by Vahe Dodge MD  ------------------------------------------------------------------------    < end of copied text >  ---------------------------------------------------------------------------------------------------------------    MICROBIOLOGY:       RADIOLOGY:  [x ] Chest radiographs reviewed and interpreted by me    < from: Xray Chest 2 Views PA/Lat (12.11.18 @ 12:23) >    EXAM:  XR CHEST PA LAT 2V                          PROCEDURE DATE:  12/11/2018      INTERPRETATION:  PA and lateral projection of the chest was obtained on   December 11, 2018.    Indication: Cough.    Impression:    The heart is normal in size. The lungs are clear. A big hiatal hernia is   seen on the left. No change in the appearance of the chest when compared   to previous study done December 6, 2018.    WILBUR GOMEZ M.D., ATTENDING RADIOLOGIST  This document has been electronically signed. Dec 11 2018  1:12PM    < end of copied text >  ---------------------------------------------------------------------------------------------------------------    < from: CT Angio Chest w/ IV Cont (12.11.18 @ 12:58) >    EXAM:  CT ANGIO CHEST (W)AW IC                          PROCEDURE DATE:  12/11/2018      INTERPRETATION:  Clinical information: Hypoxia. Evaluate for pulmonary   embolus.    CT angiogram of the chest was obtained following administration of  intravenous contrast. Approximately 90 cc of Omnipaque 350 was   administered and 10 cc was discarded. Coronal, sagittal and MIP images   were submitted for review.    Few lymph nodes are present in the pretracheal space and the AP window.    Heartis normal in size. Calcification the coronary arteries is noted. No   pericardial effusion is noted. Pulmonary arteries are normal in caliber.   No filling defects are noted.    Very large hiatal hernia is noted.    No endobronchial lesions are noted. 0.4 cm solid nodule is noted in the   right middle lobe. Centrilobular emphysema is noted bilaterally. No   pleural effusions are noted.    Below the diaphragm, visualized portions of the abdomen are unremarkable.     Degenerative changes of the spine are noted.    Impression: No pulmonary embolus is noted.    Very large hiatal hernia.    KARINE MCDOWELL M.D., ATTENDING RADIOLOGIST  This document has been electronically signed. 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 A recent ECHO revealed a preserved LVEF, no significant valvular heart disease and moderate pulmonary hypertension. The patient developed a URI last week associated with extreme fatigue, malaise, cough, lightheadedness and nausea. She was sent to the ER on 12/6 and was noted to have an EKG with tachycardia and a CXR without pneumonia. She was discharged home after receiving a dose of zofran. She was started on Cefuroxime on 12/10 over the phone. She was seen in follow-up on 12/11 and found to have multiple electrolyte abnormalities and sent to the ER for further evaluation and treatment. In the ER, the patient was noted to be hypoxic on room air. She has no further cough, sputum production, chest congestion or wheeze. She has no fevers, chills or sweats. No chest pain/pressure or palpitations. She was found to have an abnormal chest CT. Asked to evaluate    PMHX:  HTN (hypertension)  HLD (hyperlipidemia)  DM (diabetes mellitus)    PSHX:  Bilateral hip replacement  Right knee replacement      FAMILY HISTORY:  No pertinent family history in first degree relatives      SOCIAL HISTORY:  ; lives with ; retired from [employer]; former smoker 1ppd x 40 years discontinued ~ 15 years ago    Pulmonary Medications:       Antimicrobials:      Cardiology:  amLODIPine   Tablet 5 milliGRAM(s) Oral daily  lisinopril 40 milliGRAM(s) Oral daily  metoprolol succinate  milliGRAM(s) Oral daily      Other:  aluminum hydroxide/magnesium hydroxide/simethicone Suspension 30 milliLiter(s) Oral every 4 hours PRN  atorvastatin 40 milliGRAM(s) Oral at bedtime  calcium carbonate 1250 mG  + Vitamin D (OsCal 500 + D) 1 Tablet(s) Oral three times a day  influenza   Vaccine 0.5 milliLiter(s) IntraMuscular once  magnesium oxide 400 milliGRAM(s) Oral daily  pantoprazole    Tablet 40 milliGRAM(s) Oral before breakfast  potassium acid phosphate/sodium acid phosphate tablet (K-PHOS No. 2) 1 Tablet(s) Oral four times a day with meals      Allergies    No Known Allergies    HOME MEDICATIONS: see  H & P    REVIEW OF SYSTEMS:  Constitutional: As per HPI  HEENT: Within normal limits  CV: As per HPI  Resp: As per HPI  GI: Within normal limits   : Within normal limits  Musculoskeletal: Within normal limits  Skin: Within normal limits  Neurological: Within normal limits  Psychiatric: Within normal limits  Endocrine: Within normal limits  Hematologic/Lymphatic: Within normal limits  Allergic/Immunologic: Within normal limits    [x] All other systems negative    OBJECTIVE:    I&O's Detail    12 Dec 2018 07:01  -  13 Dec 2018 07:00  --------------------------------------------------------  IN:    IV PiggyBack: 50 mL    Oral Fluid: 1080 mL    sodium chloride 0.9% with potassium chloride 20 mEq/L: 1800 mL  Total IN: 2930 mL    OUT:  Total OUT: 0 mL    Total NET: 2930 mL      13 Dec 2018 07:01  -  13 Dec 2018 12:15  --------------------------------------------------------  IN:    Oral Fluid: 400 mL  Total IN: 400 mL    OUT:  Total OUT: 0 mL    Total NET: 400 mL    POCT Blood Glucose.: 162 mg/dL (13 Dec 2018 08:16)  POCT Blood Glucose.: 117 mg/dL (12 Dec 2018 21:52)  POCT Blood Glucose.: 155 mg/dL (12 Dec 2018 17:12)  POCT Blood Glucose.: 124 mg/dL (12 Dec 2018 12:21)      PHYSICAL EXAM:  ICU Vital Signs Last 24 Hrs  T(C): 36.8 (13 Dec 2018 04:42), Max: 37.1 (12 Dec 2018 12:34)  T(F): 98.2 (13 Dec 2018 04:42), Max: 98.7 (12 Dec 2018 12:34)  HR: 86 (13 Dec 2018 04:42) (68 - 86)  BP: 143/87 (13 Dec 2018 04:42) (105/69 - 143/87)  BP(mean): --  ABP: --  ABP(mean): --  RR: 18 (13 Dec 2018 04:42) (17 - 94)  SpO2: 86% (13 Dec 2018 10:14) (86% on room air -> 96% on 2lpm nasal canula at rest    General: Awake. Alert. Cooperative. No distress. Appears stated age 	  HEENT: Atraumatic. Normocephalic. Anicteric. Normal oral mucosa. PERRL. EOMI.  Neck: Supple. Trachea midline. Thyroid without enlargement/tenderness/nodules. No carotid bruit. No JVD.	  Cardiovascular: Regular rate and rhythm. S1 S2 normal. No murmurs, rubs or gallops.  Respiratory: Respirations unlabored. Decreased breath sounds throughout. Decreased breath sounds with dullness to percussion left base. No curvature.  Abdomen: Soft. Non-tender. Non-distended. No organomegaly. No masses. Normal bowel sounds.  Extremities: Warm to touch. No clubbing or cyanosis. No pedal edema.  Pulses: 2+ peripheral pulses all extremities.	  Skin: Normal skin color. No rashes or lesions. No ecchymoses. No cyanosis. Warm to touch.  Lymph Nodes: Cervical, supraclavicular and axillary nodes normal  Neurological: Motor and sensory examination equal and normal. A and O x 3  Psychiatry: Appropriate mood and affect.      LABS:                          11.3   10.10 )-----------( 315      ( 12 Dec 2018 07:53 )             34.7     12-13    137  |  108  |  18  ----------------------------<  128<H>  4.9   |  19<L>  |  0.80    12-12    140  |  104  |  22  ----------------------------<  95  3.7   |  21<L>  |  0.93    Ca      7.5<L>      12-13    Ca      7.0<L>      12-12    Phos    1.2     12-13      Mg       1.4     12-13    Mg       1.0     12-12    TPro  6.9  /  Alb  3.4  /  TBili  0.5  /  DBili  0.1  /  AST  19  /  ALT  13  /  AlkPhos  75  12-12    TPro  7.9  /  Alb  3.8  /  TBili  0.6  /  DBili  x   /  AST  29  /  ALT  15  /  AlkPhos  82  12-11    < from: Transthoracic Echocardiogram (12.12.18 @ 15:46) >    Patient name: GOLDBERG, EDITH  YOB: 1945   Age: 73 (F)   MR#: [id number]  Study Date: 12/12/2018  [id number] Elvi C[facility]  Study quality: Technically good  Referring Physician: Luis Alberto Osborne MD  Blood Pressure: 99/60 mmHg  Height: 163 cm  Weight: 73 kg  BSA: 1.8 m2  ------------------------------------------------------------------------  PROCEDURE: Transthoracic echocardiogram with 2-D, M-Mode  and complete spectral and color flow Doppler. Patient was  injected with 10 cc's of aerosolized saline. Patient was  injected with 10 cc's of aerosolized saline.  INDICATION: Dyspnea, unspecified (R06.00)  ------------------------------------------------------------------------  Dimensions:    Normal Values:  LA:     2.4    2.0 - 4.0 cm  Ao:     2.7    2.0 - 3.8 cm  SEPTUM: 0.7    0.6 - 1.2 cm  PWT:    0.7    0.6 - 1.1 cm  LVIDd:  3.9    3.0 - 5.6 cm  LVIDs:  2.5    1.8 - 4.0 cm  Derived variables:  LVMI: 42 g/m2  RWT: 0.35  Fractional short: 36 %  EF (Xiaooltz): 66 %  Doppler Peak Velocity (m/sec): AoV=1.8 PV=0.8  ------------------------------------------------------------------------  Observations:  Mitral Valve: Normal mitral valve. Minimal mitral  regurgitation.  Aortic Valve/Aorta: Normal trileaflet aortic valve. Peak  transaortic valve gradient equals 13 mm Hg, consistent with  mild aortic stenosis. Peak left ventricular outflow tract  gradient equals 6 mm Hg, mean gradient is equal to 4 mm Hg,  LVOT velocity time integral equals 29 cm.  Aortic Root: 2.7 cm.  LVOT diameter: 2 cm.  Left Atrium: Normal left atrium.  LA volume index = 17  cc/m2.  Left Ventricle: Normal left ventricular systolic function.  No segmental wall motion abnormalities. Normal left  ventricular internal dimensions and wall thicknesses.  Right Heart: Normal right atrium. Normal right ventricular  size and function. Normal tricuspid valve. Normal pulmonic  valve.  Pericardium/Pleura: Normal pericardium with no pericardial  effusion.  Hemodynamic: Estimated right atrial pressure is 8 mm Hg.  Estimated right ventricular systolic pressure equals 49 mm  Hg, assuming right atrial pressure equals 8 mm Hg,  consistent with mild pulmonary hypertension. Agitated  saline injection demonstrates no evidence of a patent  foramen ovale.  ------------------------------------------------------------------------  Conclusions:  1. Normal mitral valve. Minimal mitral regurgitation.  2. Normal trileaflet aortic valve.  3. Normal left ventricular internal dimensions and wall  thicknesses.  4. Normal left ventricular systolic function. No segmental  wall motion abnormalities.  5. Normal right ventricular size and function.  6. Estimated right ventricular systolic pressure equals 49  mm Hg, assuming right atrial pressure equals 8 mm Hg,  consistent with mild pulmonary hypertension.  7. Agitated saline injection demonstrates no evidence of a  patent foramen ovale.  *** No previous Echo exam.  ------------------------------------------------------------------------  Confirmed on  12/12/2018 - 18:22:37 by Vahe Dodge MD  ------------------------------------------------------------------------    < end of copied text >  ---------------------------------------------------------------------------------------------------------------    MICROBIOLOGY:       RADIOLOGY:  [x ] Chest radiographs reviewed and interpreted by me    < from: Xray Chest 2 Views PA/Lat (12.11.18 @ 12:23) >    EXAM:  XR CHEST PA LAT 2V                          PROCEDURE DATE:  12/11/2018      INTERPRETATION:  PA and lateral projection of the chest was obtained on   December 11, 2018.    Indication: Cough.    Impression:    The heart is normal in size. The lungs are clear. A big hiatal hernia is   seen on the left. No change in the appearance of the chest when compared   to previous study done December 6, 2018.    WILBUR GOMEZ M.D., ATTENDING RADIOLOGIST  This document has been electronically signed. Dec 11 2018  1:12PM    < end of copied text >  ---------------------------------------------------------------------------------------------------------------    < from: CT Angio Chest w/ IV Cont (12.11.18 @ 12:58) >    EXAM:  CT ANGIO CHEST (W)AW IC                          PROCEDURE DATE:  12/11/2018      INTERPRETATION:  Clinical information: Hypoxia. Evaluate for pulmonary   embolus.    CT angiogram of the chest was obtained following administration of  intravenous contrast. Approximately 90 cc of Omnipaque 350 was   administered and 10 cc was discarded. Coronal, sagittal and MIP images   were submitted for review.    Few lymph nodes are present in the pretracheal space and the AP window.    Heartis normal in size. Calcification the coronary arteries is noted. No   pericardial effusion is noted. Pulmonary arteries are normal in caliber.   No filling defects are noted.    Very large hiatal hernia is noted.    No endobronchial lesions are noted. 0.4 cm solid nodule is noted in the   right middle lobe. Centrilobular emphysema is noted bilaterally. No   pleural effusions are noted.    Below the diaphragm, visualized portions of the abdomen are unremarkable.     Degenerative changes of the spine are noted.    Impression: No pulmonary embolus is noted.    Very large hiatal hernia.    KARINE MCDOWELL M.D., ATTENDING RADIOLOGIST  This document has been electronically signed. 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 A recent ECHO revealed a preserved LVEF, no significant valvular heart disease and moderate pulmonary hypertension. The patient developed a URI last week associated with extreme fatigue, malaise, cough, lightheadedness and nausea. She was sent to the ER on 12/6 and was noted to have an EKG with tachycardia and a CXR without pneumonia. She was discharged home after receiving a dose of zofran. She was started on Cefuroxime by her PCP on 12/10 over the phone. She was seen in follow-up on 12/11 and found to have multiple electrolyte abnormalities and sent to the ER for further evaluation and treatment. In the ER, the patient was noted to be hypoxic on room air. She has no further cough, sputum production, chest congestion or wheeze. She has no fevers, chills or sweats. No chest pain/pressure or palpitations. She was found to have an abnormal chest CT. Asked to evaluate    PMHX:  HTN (hypertension)  HLD (hyperlipidemia)  DM (diabetes mellitus)    PSHX:  Bilateral hip replacement  Left knee replacement      FAMILY HISTORY:  No pertinent family history in first degree relatives      SOCIAL HISTORY:  ; lives with ; retired from [employer]; former smoker 1ppd x 40 years discontinued ~ 15 years ago    Pulmonary Medications:       Antimicrobials:      Cardiology:  amLODIPine   Tablet 5 milliGRAM(s) Oral daily  lisinopril 40 milliGRAM(s) Oral daily  metoprolol succinate  milliGRAM(s) Oral daily      Other:  aluminum hydroxide/magnesium hydroxide/simethicone Suspension 30 milliLiter(s) Oral every 4 hours PRN  atorvastatin 40 milliGRAM(s) Oral at bedtime  calcium carbonate 1250 mG  + Vitamin D (OsCal 500 + D) 1 Tablet(s) Oral three times a day  influenza   Vaccine 0.5 milliLiter(s) IntraMuscular once  magnesium oxide 400 milliGRAM(s) Oral daily  pantoprazole    Tablet 40 milliGRAM(s) Oral before breakfast  potassium acid phosphate/sodium acid phosphate tablet (K-PHOS No. 2) 1 Tablet(s) Oral four times a day with meals      Allergies    No Known Allergies    HOME MEDICATIONS: see  H & P    REVIEW OF SYSTEMS:  Constitutional: As per HPI  HEENT: Within normal limits  CV: As per HPI  Resp: As per HPI  GI: Within normal limits   : Within normal limits  Musculoskeletal: Within normal limits  Skin: Within normal limits  Neurological: Within normal limits  Psychiatric: Within normal limits  Endocrine: Within normal limits  Hematologic/Lymphatic: Within normal limits  Allergic/Immunologic: Within normal limits    [x] All other systems negative    OBJECTIVE:    I&O's Detail    12 Dec 2018 07:01  -  13 Dec 2018 07:00  --------------------------------------------------------  IN:    IV PiggyBack: 50 mL    Oral Fluid: 1080 mL    sodium chloride 0.9% with potassium chloride 20 mEq/L: 1800 mL  Total IN: 2930 mL    OUT:  Total OUT: 0 mL    Total NET: 2930 mL      13 Dec 2018 07:01  -  13 Dec 2018 12:15  --------------------------------------------------------  IN:    Oral Fluid: 400 mL  Total IN: 400 mL    OUT:  Total OUT: 0 mL    Total NET: 400 mL    POCT Blood Glucose.: 162 mg/dL (13 Dec 2018 08:16)  POCT Blood Glucose.: 117 mg/dL (12 Dec 2018 21:52)  POCT Blood Glucose.: 155 mg/dL (12 Dec 2018 17:12)  POCT Blood Glucose.: 124 mg/dL (12 Dec 2018 12:21)      PHYSICAL EXAM:  ICU Vital Signs Last 24 Hrs  T(C): 36.8 (13 Dec 2018 04:42), Max: 37.1 (12 Dec 2018 12:34)  T(F): 98.2 (13 Dec 2018 04:42), Max: 98.7 (12 Dec 2018 12:34)  HR: 86 (13 Dec 2018 04:42) (68 - 86)  BP: 143/87 (13 Dec 2018 04:42) (105/69 - 143/87)  BP(mean): --  ABP: --  ABP(mean): --  RR: 18 (13 Dec 2018 04:42) (17 - 94)  SpO2: 86% (13 Dec 2018 10:14) (86% on room air -> 96% on 2lpm nasal canula at rest    General: Awake. Alert. Cooperative. No distress. Appears stated age 	  HEENT: Atraumatic. Normocephalic. Anicteric. Normal oral mucosa. PERRL. EOMI.  Neck: Supple. Trachea midline. Thyroid without enlargement/tenderness/nodules. No carotid bruit. No JVD.	  Cardiovascular: Regular rate and rhythm. S1 S2 normal. No murmurs, rubs or gallops.  Respiratory: Respirations unlabored. Decreased breath sounds throughout. Decreased breath sounds with dullness to percussion left base. No curvature.  Abdomen: Soft. Non-tender. Non-distended. No organomegaly. No masses. Normal bowel sounds.  Extremities: Warm to touch. No clubbing or cyanosis. No pedal edema.  Pulses: 2+ peripheral pulses all extremities.	  Skin: Normal skin color. No rashes or lesions. No ecchymoses. No cyanosis. Warm to touch.  Lymph Nodes: Cervical, supraclavicular and axillary nodes normal  Neurological: Motor and sensory examination equal and normal. A and O x 3  Psychiatry: Appropriate mood and affect.      LABS:                          11.3   10.10 )-----------( 315      ( 12 Dec 2018 07:53 )             34.7     12-13    137  |  108  |  18  ----------------------------<  128<H>  4.9   |  19<L>  |  0.80    12-12    140  |  104  |  22  ----------------------------<  95  3.7   |  21<L>  |  0.93    Ca      7.5<L>      12-13    Ca      7.0<L>      12-12    Phos    1.2     12-13      Mg       1.4     12-13    Mg       1.0     12-12    TPro  6.9  /  Alb  3.4  /  TBili  0.5  /  DBili  0.1  /  AST  19  /  ALT  13  /  AlkPhos  75  12-12    TPro  7.9  /  Alb  3.8  /  TBili  0.6  /  DBili  x   /  AST  29  /  ALT  15  /  AlkPhos  82  12-11    < from: Transthoracic Echocardiogram (12.12.18 @ 15:46) >    Patient name: GOLDBERG, EDITH  YOB: 1945   Age: 73 (F)   MR#: [id number]  Study Date: 12/12/2018  [id number] Elvi Condon [facility]  Study quality: Technically good  Referring Physician: Luis Alberto Osborne MD  Blood Pressure: 99/60 mmHg  Height: 163 cm  Weight: 73 kg  BSA: 1.8 m2  ------------------------------------------------------------------------  PROCEDURE: Transthoracic echocardiogram with 2-D, M-Mode  and complete spectral and color flow Doppler. Patient was  injected with 10 cc's of aerosolized saline. Patient was  injected with 10 cc's of aerosolized saline.  INDICATION: Dyspnea, unspecified (R06.00)  ------------------------------------------------------------------------  Dimensions:    Normal Values:  LA:     2.4    2.0 - 4.0 cm  Ao:     2.7    2.0 - 3.8 cm  SEPTUM: 0.7    0.6 - 1.2 cm  PWT:    0.7    0.6 - 1.1 cm  LVIDd:  3.9    3.0 - 5.6 cm  LVIDs:  2.5    1.8 - 4.0 cm  Derived variables:  LVMI: 42 g/m2  RWT: 0.35  Fractional short: 36 %  EF ([place]): 66 %  Doppler Peak Velocity (m/sec): AoV=1.8 PV=0.8  ------------------------------------------------------------------------  Observations:  Mitral Valve: Normal mitral valve. Minimal mitral  regurgitation.  Aortic Valve/Aorta: Normal trileaflet aortic valve. Peak  transaortic valve gradient equals 13 mm Hg, consistent with  mild aortic stenosis. Peak left ventricular outflow tract  gradient equals 6 mm Hg, mean gradient is equal to 4 mm Hg,  LVOT velocity time integral equals 29 cm.  Aortic Root: 2.7 cm.  LVOT diameter: 2 cm.  Left Atrium: Normal left atrium.  LA volume index = 17  cc/m2.  Left Ventricle: Normal left ventricular systolic function.  No segmental wall motion abnormalities. Normal left  ventricular internal dimensions and wall thicknesses.  Right Heart: Normal right atrium. Normal right ventricular  size and function. Normal tricuspid valve. Normal pulmonic  valve.  Pericardium/Pleura: Normal pericardium with no pericardial  effusion.  Hemodynamic: Estimated right atrial pressure is 8 mm Hg.  Estimated right ventricular systolic pressure equals 49 mm  Hg, assuming right atrial pressure equals 8 mm Hg,  consistent with mild pulmonary hypertension. Agitated  saline injection demonstrates no evidence of a patent  foramen ovale.  ------------------------------------------------------------------------  Conclusions:  1. Normal mitral valve. Minimal mitral regurgitation.  2. Normal trileaflet aortic valve.  3. Normal left ventricular internal dimensions and wall  thicknesses.  4. Normal left ventricular systolic function. No segmental  wall motion abnormalities.  5. Normal right ventricular size and function.  6. Estimated right ventricular systolic pressure equals 49  mm Hg, assuming right atrial pressure equals 8 mm Hg,  consistent with mild pulmonary hypertension.  7. Agitated saline injection demonstrates no evidence of a  patent foramen ovale.  *** No previous Echo exam.  ------------------------------------------------------------------------  Confirmed on  12/12/2018 - 18:22:37 by Vahe Dodge MD  ------------------------------------------------------------------------    < end of copied text >  ---------------------------------------------------------------------------------------------------------------    MICROBIOLOGY:       RADIOLOGY:  [x ] Chest radiographs reviewed and interpreted by me    < from: Xray Chest 2 Views PA/Lat (12.11.18 @ 12:23) >    EXAM:  XR CHEST PA LAT 2V                          PROCEDURE DATE:  12/11/2018      INTERPRETATION:  PA and lateral projection of the chest was obtained on   December 11, 2018.    Indication: Cough.    Impression:    The heart is normal in size. The lungs are clear. A big hiatal hernia is   seen on the left. No change in the appearance of the chest when compared   to previous study done December 6, 2018.    WILBUR GOMEZ M.D., ATTENDING RADIOLOGIST  This document has been electronically signed. Dec 11 2018  1:12PM    < end of copied text >  ---------------------------------------------------------------------------------------------------------------    < from: CT Angio Chest w/ IV Cont (12.11.18 @ 12:58) >    EXAM:  CT ANGIO CHEST (W)AW IC                          PROCEDURE DATE:  12/11/2018      INTERPRETATION:  Clinical information: Hypoxia. Evaluate for pulmonary   embolus.    CT angiogram of the chest was obtained following administration of  intravenous contrast. Approximately 90 cc of Omnipaque 350 was   administered and 10 cc was discarded. Coronal, sagittal and MIP images   were submitted for review.    Few lymph nodes are present in the pretracheal space and the AP window.    Heartis normal in size. Calcification the coronary arteries is noted. No   pericardial effusion is noted. Pulmonary arteries are normal in caliber.   No filling defects are noted.    Very large hiatal hernia is noted.    No endobronchial lesions are noted. 0.4 cm solid nodule is noted in the   right middle lobe. Centrilobular emphysema is noted bilaterally. No   pleural effusions are noted.    Below the diaphragm, visualized portions of the abdomen are unremarkable.     Degenerative changes of the spine are noted.    Impression: No pulmonary embolus is noted.    Very large hiatal hernia.    KARINE MCDOWELL M.D., ATTENDING RADIOLOGIST  This document has been electronically signed. Dec 11 2018  1:12PM     < end of copied text >  ---------------------------------------------------------------------------------------------------------------  SPIROMETRY:    FEV1 1.19 liters - 53% predicted  FVC 2.06 liters - 69% predicted  FEV1% 58    c/w moderate obstructive lung disease

## 2018-12-13 NOTE — PROGRESS NOTE ADULT - ASSESSMENT
73 year old female PMH HTN, HLD, DM(II), presents with lab results showing hypokalemia and hypocalcemia. Was here last week for URI symptoms, which persisted so went to PMD on Monday. Prescribed Cefuroxime and Zofran for nausea, and was found to have abnormal lab results today. Referred to the ER for eval and admit.  Admits to nausea, headache, general malaise and fatigue. Cough has improved. No other complaints.  In ER mild hypoxia noted.     CV: Stop HCTZ given hypokalemia. BP stable on arrival 137/82.   Continue Toprol  mg/day, Norvasc 5 mg/day and Lisinopril 40 mg/day.   TTE with bubble study shows no r/o right to left shunt as potential cause of hypoxemia.      Renal: Replace K to 4.9 today. Magnesium level low 1.0. Oral Magnesium 400 mg/day given   for 5 days along with second one dose of Mag sulfate 2 grams IVSS x 1. Elevated anion gap likely from dehydration   now normalized. IVF stopped. Calcium improved to 7.5 from 6.3. Will replace OSCAL tid for now.   Recheck all lytes in AM. Phosphorus low 1.4, being replaced.     Endo: Hold Metformen while in hospital and with increased anion gap acidosis.   Finger sticks AC and HS with SSC.     GI: Continue Prilosec 40 mg/day with Maalox prn.     COPD: CTA notes no PE or effusions. Mild centolobular emphysema noted.   No wheeze on exam. No treatment for now but does explain low oxygen sats in the ER.       Bedside spirometry ordered to r/o COPD. Will need outpatient PFT.  Pulmonary   consult called to eval for COPD treatment. 73 year old female PMH HTN, HLD, DM(II), presents with lab results showing hypokalemia and hypocalcemia. Was here last week for URI symptoms, which persisted so went to PMD on Monday. Prescribed Cefuroxime and Zofran for nausea, and was found to have abnormal lab results today. Referred to the ER for eval and admit.  Admits to nausea, headache, general malaise and fatigue. Cough has improved. No other complaints.  In ER mild hypoxia noted.     CV: Stop HCTZ given hypokalemia. BP stable on arrival 137/82.   Continue Toprol  mg/day, Norvasc 5 mg/day and Lisinopril 40 mg/day.   TTE with bubble study shows no r/o right to left shunt as potential cause of hypoxemia.      Renal: Replace K to 4.9 today. Magnesium level low 1.0. Oral Magnesium 400 mg/day given   for 5 days along with second one dose of Mag sulfate 2 grams IVSS x 1. Elevated anion gap likely from dehydration   now normalized. IVF stopped. Calcium improved to 7.5 from 6.3. Will replace OSCAL tid for now.   Recheck all lytes in AM. Phosphorus low 1.4, being replaced.     Endo: Hold Metformen while in hospital and with increased anion gap acidosis.   Finger sticks AC and HS with SSC.     GI: Continue Prilosec 40 mg/day with Maalox prn.     COPD: CTA notes no PE or effusions. Mild centolobular emphysema noted.   No wheeze on exam. No treatment for now but does explain low oxygen sats in the ER.       Bedside spirometry ordered to r/o COPD. Will need outpatient PFT.  Pulmonary   consult called to eval for COPD treatment. RA sats 86 percent, improves with oxygen.    Comfortable on oxygen. Change to humidified. May need home oxygen. 73 year old female PMH HTN, HLD, DM(II), presents with lab results showing hypokalemia and hypocalcemia. Was here last week for URI symptoms, which persisted so went to PMD on Monday. Prescribed Cefuroxime and Zofran for nausea, and was found to have abnormal lab results today. Referred to the ER for eval and admit.  Admits to nausea, headache, general malaise and fatigue. Cough has improved. No other complaints.  In ER mild hypoxia noted.     CV: Stop HCTZ given hypokalemia. BP stable on arrival 137/82.   Continue Toprol  mg/day, Norvasc 5 mg/day and Lisinopril 40 mg/day.   TTE with bubble study shows no r/o right to left shunt as potential cause of hypoxemia.    TTE shows mild pulmonary HTN.     Renal: Replace K to 4.9 today. Magnesium level low 1.0. Oral Magnesium 400 mg/day given   for 5 days along with second one dose of Mag sulfate 2 grams IVSS x 1. Elevated anion gap likely from dehydration   now normalized. IVF stopped. Calcium improved to 7.5 from 6.3. Will replace OSCAL tid for now.   Recheck all lytes in AM. Phosphorus low 1.4, being replaced.     Endo: Hold Metformen while in hospital and with increased anion gap acidosis.   Finger sticks AC and HS with SSC.     GI: Continue Prilosec 40 mg/day with Maalox prn.     COPD: CTA notes no PE or effusions. Mild centolobular emphysema noted.   No wheeze on exam. No treatment for now but does explain low oxygen sats in the ER.       Bedside spirometry ordered to r/o COPD. Will need outpatient PFT.  Pulmonary   consult called to eval for COPD treatment. RA sats 86 percent, improves with oxygen.    Comfortable on oxygen. Change to humidified. May need home oxygen.

## 2018-12-13 NOTE — PROGRESS NOTE ADULT - SUBJECTIVE AND OBJECTIVE BOX
CARDIOLOGY     PROGRESS  NOTE   ________________________________________________    CHIEF COMPLAINT:Patient is a 73y old  Female who presents with a chief complaint of Hypokalemia and hypocalcemia (13 Dec 2018 08:42)    	  REVIEW OF SYSTEMS:  CONSTITUTIONAL: No fever, weight loss, or fatigue  EYES: No eye pain, visual disturbances, or discharge  ENT:  No difficulty hearing, tinnitus, vertigo; No sinus or throat pain  NECK: No pain or stiffness  RESPIRATORY: No cough, wheezing, chills or hemoptysis; + Shortness of Breath  CARDIOVASCULAR: No chest pain, palpitations, passing out, dizziness, or leg swelling  GASTROINTESTINAL: No abdominal or epigastric pain. No nausea, vomiting, or hematemesis; No diarrhea or constipation. No melena or hematochezia.  GENITOURINARY: No dysuria, frequency, hematuria, or incontinence  NEUROLOGICAL: No headaches, memory loss, loss of strength, numbness, or tremors  SKIN: No itching, burning, rashes, or lesions   LYMPH Nodes: No enlarged glands  ENDOCRINE: No heat or cold intolerance; No hair loss  MUSCULOSKELETAL: No joint pain or swelling; No muscle, back, or extremity pain  PSYCHIATRIC: No depression, anxiety, mood swings, or difficulty sleeping  HEME/LYMPH: No easy bruising, or bleeding gums  ALLERGY AND IMMUNOLOGIC: No hives or eczema	    [ ] All others negative	  [ ] Unable to obtain    PHYSICAL EXAM:  T(C): 36.8 (12-13-18 @ 04:42), Max: 37.1 (12-12-18 @ 12:34)  HR: 86 (12-13-18 @ 04:42) (68 - 86)  BP: 143/87 (12-13-18 @ 04:42) (105/69 - 143/87)  RR: 18 (12-13-18 @ 04:42) (17 - 94)  SpO2: 94% (12-13-18 @ 04:42) (86% - 95%)  Wt(kg): --  I&O's Summary    12 Dec 2018 07:01  -  13 Dec 2018 07:00  --------------------------------------------------------  IN: 2930 mL / OUT: 0 mL / NET: 2930 mL        Appearance: Normal	  HEENT:   Normal oral mucosa, PERRL, EOMI	  Lymphatic: No lymphadenopathy  Cardiovascular: Normal S1 S2, No JVD, + murmurs, No edema  Respiratory: Lungs clear to auscultation	  Psychiatry: A & O x 3, Mood & affect appropriate  Gastrointestinal:  Soft, Non-tender, + BS	  Skin: No rashes, No ecchymoses, No cyanosis	  Neurologic: Non-focal  Extremities: Normal range of motion, No clubbing, cyanosis or edema  Vascular: Peripheral pulses palpable 2+ bilaterally    MEDICATIONS  (STANDING):  amLODIPine   Tablet 5 milliGRAM(s) Oral daily  atorvastatin 40 milliGRAM(s) Oral at bedtime  calcium carbonate 1250 mG  + Vitamin D (OsCal 500 + D) 1 Tablet(s) Oral three times a day  influenza   Vaccine 0.5 milliLiter(s) IntraMuscular once  lisinopril 40 milliGRAM(s) Oral daily  magnesium oxide 400 milliGRAM(s) Oral daily  metoprolol succinate  milliGRAM(s) Oral daily  pantoprazole    Tablet 40 milliGRAM(s) Oral before breakfast  potassium acid phosphate/sodium acid phosphate tablet (K-PHOS No. 2) 1 Tablet(s) Oral four times a day with meals      TELEMETRY: 	    ECG:  	  RADIOLOGY:  OTHER: 	  	  LABS:	 	    CARDIAC MARKERS:                                11.3   10.10 )-----------( 315      ( 12 Dec 2018 07:53 )             34.7     12-13    137  |  108  |  18  ----------------------------<  128<H>  4.9   |  19<L>  |  0.80    Ca    7.5<L>      13 Dec 2018 06:42  Phos  1.2     12-13  Mg     1.4     12-13    TPro  6.9  /  Alb  3.4  /  TBili  0.5  /  DBili  0.1  /  AST  19  /  ALT  13  /  AlkPhos  75  12-12    proBNP:   Lipid Profile:   HgA1c:   TSH:   PT/INR - ( 11 Dec 2018 11:47 )   PT: 18.3 sec;   INR: 1.58 ratio    < from: CT Angio Chest w/ IV Cont (12.11.18 @ 12:58) >  INTERPRETATION:  Clinical information: Hypoxia. Evaluate for pulmonary   embolus.    CT angiogram of the chest was obtained following administration of  intravenous contrast. Approximately 90 cc of Omnipaque 350 was   administered and 10 cc was discarded. Coronal, sagittal and MIP images   were submitted for review.    Few lymph nodes are present in the pretracheal space and the AP window.    Heartis normal in size. Calcification the coronary arteries is noted. No   pericardial effusion is noted. Pulmonary arteries are normal in caliber.   No filling defects are noted.    Very large hiatal hernia is noted.    No endobronchial lesions are noted. 0.4 cm solid nodule is noted in the   right middle lobe. Centrilobular emphysema is noted bilaterally. No   pleural effusions are noted.    Below the diaphragm, visualized portions of the abdomen are unremarkable.     Degenerative changes of the spine are noted.    Impression: No pulmonary embolus is noted.    Very large hiatal hernia.      < end of copied text >       PTT - ( 11 Dec 2018 11:47 )  PTT:27.6 sec    < from: Transthoracic Echocardiogram (12.12.18 @ 15:46) >  1. Normal mitral valve. Minimal mitral regurgitation.  2. Normal trileaflet aortic valve.  3. Normal left ventricular internal dimensions and wall  thicknesses.  4. Normal left ventricular systolic function. No segmental  wall motion abnormalities.  5. Normal right ventricular size and function.  6. Estimated right ventricular systolic pressure equals 49  mm Hg, assuming right atrial pressure equals 8 mm Hg,  consistent with mild pulmonary hypertension.  7. Agitated saline injection demonstrates no evidence of a  patent foramen ovale.    < end of copied text >    Assessment and plan  ---------------------------  pulmonary consult with emphysema ,smoking hx   echo neg with  bubble study  dvt prophylaxis

## 2018-12-13 NOTE — CONSULT NOTE ADULT - CONSULT REASON
COPD; emphysema; hiatal hernia; pulmonary hypertension hypoxemia; COPD; emphysema; hiatal hernia; pulmonary hypertension

## 2018-12-13 NOTE — PROGRESS NOTE ADULT - SUBJECTIVE AND OBJECTIVE BOX
INTERVAL HPI/OVERNIGHT EVENTS:  Pt seen and examined at bedside.     Allergies/Intolerance: No Known Allergies      MEDICATIONS  (STANDING):  amLODIPine   Tablet 5 milliGRAM(s) Oral daily  atorvastatin 40 milliGRAM(s) Oral at bedtime  calcium carbonate 1250 mG  + Vitamin D (OsCal 500 + D) 1 Tablet(s) Oral three times a day  influenza   Vaccine 0.5 milliLiter(s) IntraMuscular once  lisinopril 40 milliGRAM(s) Oral daily  magnesium oxide 400 milliGRAM(s) Oral daily  metoprolol succinate  milliGRAM(s) Oral daily  pantoprazole    Tablet 40 milliGRAM(s) Oral before breakfast  potassium chloride    Tablet ER 20 milliEquivalent(s) Oral four times a day  sodium chloride 0.9% with potassium chloride 20 mEq/L 1000 milliLiter(s) (75 mL/Hr) IV Continuous <Continuous>    MEDICATIONS  (PRN):  aluminum hydroxide/magnesium hydroxide/simethicone Suspension 30 milliLiter(s) Oral every 4 hours PRN Dyspepsia        ROS: all systems reviewed and wnl      PHYSICAL EXAMINATION:  Vital Signs Last 24 Hrs  T(C): 36.8 (13 Dec 2018 04:42), Max: 37.1 (12 Dec 2018 12:34)  T(F): 98.2 (13 Dec 2018 04:42), Max: 98.7 (12 Dec 2018 12:34)  HR: 86 (13 Dec 2018 04:42) (68 - 86)  BP: 143/87 (13 Dec 2018 04:42) (105/69 - 143/87)  BP(mean): --  RR: 18 (13 Dec 2018 04:42) (17 - 94)  SpO2: 94% (13 Dec 2018 04:42) (86% - 95%)  CAPILLARY BLOOD GLUCOSE      POCT Blood Glucose.: 162 mg/dL (13 Dec 2018 08:16)  POCT Blood Glucose.: 117 mg/dL (12 Dec 2018 21:52)  POCT Blood Glucose.: 155 mg/dL (12 Dec 2018 17:12)  POCT Blood Glucose.: 124 mg/dL (12 Dec 2018 12:21)      12-12 @ 07:01  -  12-13 @ 07:00  --------------------------------------------------------  IN: 2930 mL / OUT: 0 mL / NET: 2930 mL        GENERAL:   NECK: supple, No JVD  CHEST/LUNG: clear to auscultation bilaterally; no rales, rhonchi, or wheezing b/l  HEART: normal S1, S2  ABDOMEN: BS+, soft, ND, NT   EXTREMITIES:  pulses palpable; no clubbing, cyanosis, or edema b/l LEs  SKIN: no rashes or lesions      LABS:                        11.3   10.10 )-----------( 315      ( 12 Dec 2018 07:53 )             34.7     12-13    137  |  108  |  18  ----------------------------<  128<H>  4.9   |  19<L>  |  0.80    Ca    7.5<L>      13 Dec 2018 06:42  Phos  1.2     12-13  Mg     1.4     12-13    TPro  6.9  /  Alb  3.4  /  TBili  0.5  /  DBili  0.1  /  AST  19  /  ALT  13  /  AlkPhos  75  12-12    PT/INR - ( 11 Dec 2018 11:47 )   PT: 18.3 sec;   INR: 1.58 ratio         PTT - ( 11 Dec 2018 11:47 )  PTT:27.6 sec INTERVAL HPI/OVERNIGHT EVENTS:  Pt seen and examined at bedside.     Allergies/Intolerance: No Known Allergies      MEDICATIONS  (STANDING):  amLODIPine   Tablet 5 milliGRAM(s) Oral daily  atorvastatin 40 milliGRAM(s) Oral at bedtime  calcium carbonate 1250 mG  + Vitamin D (OsCal 500 + D) 1 Tablet(s) Oral three times a day  influenza   Vaccine 0.5 milliLiter(s) IntraMuscular once  lisinopril 40 milliGRAM(s) Oral daily  magnesium oxide 400 milliGRAM(s) Oral daily  metoprolol succinate  milliGRAM(s) Oral daily  pantoprazole    Tablet 40 milliGRAM(s) Oral before breakfast  potassium chloride    Tablet ER 20 milliEquivalent(s) Oral four times a day  sodium chloride 0.9% with potassium chloride 20 mEq/L 1000 milliLiter(s) (75 mL/Hr) IV Continuous <Continuous>    MEDICATIONS  (PRN):  aluminum hydroxide/magnesium hydroxide/simethicone Suspension 30 milliLiter(s) Oral every 4 hours PRN Dyspepsia        ROS: all systems reviewed and wnl      PHYSICAL EXAMINATION:  Vital Signs Last 24 Hrs  T(C): 36.8 (13 Dec 2018 04:42), Max: 37.1 (12 Dec 2018 12:34)  T(F): 98.2 (13 Dec 2018 04:42), Max: 98.7 (12 Dec 2018 12:34)  HR: 86 (13 Dec 2018 04:42) (68 - 86)  BP: 143/87 (13 Dec 2018 04:42) (105/69 - 143/87)  BP(mean): --  RR: 18 (13 Dec 2018 04:42) (17 - 94)  SpO2: 94% (13 Dec 2018 04:42) (86% - 95%)  CAPILLARY BLOOD GLUCOSE      POCT Blood Glucose.: 162 mg/dL (13 Dec 2018 08:16)  POCT Blood Glucose.: 117 mg/dL (12 Dec 2018 21:52)  POCT Blood Glucose.: 155 mg/dL (12 Dec 2018 17:12)  POCT Blood Glucose.: 124 mg/dL (12 Dec 2018 12:21)      12-12 @ 07:01  -  12-13 @ 07:00  --------------------------------------------------------  IN: 2930 mL / OUT: 0 mL / NET: 2930 mL        GENERAL: stable in bed, no SOB, wheeze or fevers at rest.   NECK: supple, No JVD  CHEST/LUNG: clear to auscultation bilaterally; no rales, rhonchi, or wheezing b/l  HEART: normal S1, S2  ABDOMEN: BS+, soft, ND, NT   EXTREMITIES:  pulses palpable; no clubbing, cyanosis, or edema b/l LEs  SKIN: no rashes or lesions      LABS:                        11.3   10.10 )-----------( 315      ( 12 Dec 2018 07:53 )             34.7     12-13    137  |  108  |  18  ----------------------------<  128<H>  4.9   |  19<L>  |  0.80    Ca    7.5<L>      13 Dec 2018 06:42  Phos  1.2     12-13  Mg     1.4     12-13    TPro  6.9  /  Alb  3.4  /  TBili  0.5  /  DBili  0.1  /  AST  19  /  ALT  13  /  AlkPhos  75  12-12    PT/INR - ( 11 Dec 2018 11:47 )   PT: 18.3 sec;   INR: 1.58 ratio         PTT - ( 11 Dec 2018 11:47 )  PTT:27.6 sec

## 2018-12-13 NOTE — CHART NOTE - NSCHARTNOTEFT_GEN_A_CORE
Pt's O2 sat is 86% RA. Pulm consult was called and recommend nc O2 2 L/min at bedtime and as needed. Pt was diagnosed with hypoxia 2/2 COPD.   Scott Bauman) [facility] # [id number]

## 2018-12-14 VITALS
TEMPERATURE: 98 F | OXYGEN SATURATION: 94 % | DIASTOLIC BLOOD PRESSURE: 83 MMHG | RESPIRATION RATE: 18 BRPM | SYSTOLIC BLOOD PRESSURE: 142 MMHG | HEART RATE: 71 BPM

## 2018-12-14 LAB
ANION GAP SERPL CALC-SCNC: 13 MMOL/L — SIGNIFICANT CHANGE UP (ref 5–17)
BUN SERPL-MCNC: 13 MG/DL — SIGNIFICANT CHANGE UP (ref 7–23)
CALCIUM SERPL-MCNC: 8.4 MG/DL — SIGNIFICANT CHANGE UP (ref 8.4–10.5)
CHLORIDE SERPL-SCNC: 103 MMOL/L — SIGNIFICANT CHANGE UP (ref 96–108)
CO2 SERPL-SCNC: 20 MMOL/L — LOW (ref 22–31)
CREAT SERPL-MCNC: 0.7 MG/DL — SIGNIFICANT CHANGE UP (ref 0.5–1.3)
GLUCOSE BLDC GLUCOMTR-MCNC: 252 MG/DL — HIGH (ref 70–99)
GLUCOSE BLDC GLUCOMTR-MCNC: 265 MG/DL — HIGH (ref 70–99)
GLUCOSE BLDC GLUCOMTR-MCNC: 386 MG/DL — HIGH (ref 70–99)
GLUCOSE SERPL-MCNC: 276 MG/DL — HIGH (ref 70–99)
MAGNESIUM SERPL-MCNC: 1.7 MG/DL — SIGNIFICANT CHANGE UP (ref 1.6–2.6)
PHOSPHATE SERPL-MCNC: 1.9 MG/DL — LOW (ref 2.5–4.5)
POTASSIUM SERPL-MCNC: 4.9 MMOL/L — SIGNIFICANT CHANGE UP (ref 3.5–5.3)
POTASSIUM SERPL-SCNC: 4.9 MMOL/L — SIGNIFICANT CHANGE UP (ref 3.5–5.3)
SODIUM SERPL-SCNC: 136 MMOL/L — SIGNIFICANT CHANGE UP (ref 135–145)

## 2018-12-14 PROCEDURE — 99285 EMERGENCY DEPT VISIT HI MDM: CPT | Mod: 25

## 2018-12-14 PROCEDURE — 85027 COMPLETE CBC AUTOMATED: CPT

## 2018-12-14 PROCEDURE — 82962 GLUCOSE BLOOD TEST: CPT

## 2018-12-14 PROCEDURE — 84443 ASSAY THYROID STIM HORMONE: CPT

## 2018-12-14 PROCEDURE — 85610 PROTHROMBIN TIME: CPT

## 2018-12-14 PROCEDURE — 84100 ASSAY OF PHOSPHORUS: CPT

## 2018-12-14 PROCEDURE — 94010 BREATHING CAPACITY TEST: CPT

## 2018-12-14 PROCEDURE — 84132 ASSAY OF SERUM POTASSIUM: CPT

## 2018-12-14 PROCEDURE — 83036 HEMOGLOBIN GLYCOSYLATED A1C: CPT

## 2018-12-14 PROCEDURE — 94640 AIRWAY INHALATION TREATMENT: CPT

## 2018-12-14 PROCEDURE — 85730 THROMBOPLASTIN TIME PARTIAL: CPT

## 2018-12-14 PROCEDURE — 83735 ASSAY OF MAGNESIUM: CPT

## 2018-12-14 PROCEDURE — 93306 TTE W/DOPPLER COMPLETE: CPT

## 2018-12-14 PROCEDURE — 82435 ASSAY OF BLOOD CHLORIDE: CPT

## 2018-12-14 PROCEDURE — 80048 BASIC METABOLIC PNL TOTAL CA: CPT

## 2018-12-14 PROCEDURE — 85014 HEMATOCRIT: CPT

## 2018-12-14 PROCEDURE — 80076 HEPATIC FUNCTION PANEL: CPT

## 2018-12-14 PROCEDURE — 82330 ASSAY OF CALCIUM: CPT

## 2018-12-14 PROCEDURE — 71275 CT ANGIOGRAPHY CHEST: CPT

## 2018-12-14 PROCEDURE — 82803 BLOOD GASES ANY COMBINATION: CPT

## 2018-12-14 PROCEDURE — 83605 ASSAY OF LACTIC ACID: CPT

## 2018-12-14 PROCEDURE — 83970 ASSAY OF PARATHORMONE: CPT

## 2018-12-14 PROCEDURE — 96374 THER/PROPH/DIAG INJ IV PUSH: CPT | Mod: XU

## 2018-12-14 PROCEDURE — 93005 ELECTROCARDIOGRAM TRACING: CPT

## 2018-12-14 PROCEDURE — 82310 ASSAY OF CALCIUM: CPT

## 2018-12-14 PROCEDURE — 90686 IIV4 VACC NO PRSV 0.5 ML IM: CPT

## 2018-12-14 PROCEDURE — 80053 COMPREHEN METABOLIC PANEL: CPT

## 2018-12-14 PROCEDURE — 82947 ASSAY GLUCOSE BLOOD QUANT: CPT

## 2018-12-14 PROCEDURE — 84295 ASSAY OF SERUM SODIUM: CPT

## 2018-12-14 PROCEDURE — 71046 X-RAY EXAM CHEST 2 VIEWS: CPT

## 2018-12-14 RX ORDER — UMECLIDINIUM 62.5 UG/1
62.5 AEROSOL, POWDER ORAL
Qty: 30 | Refills: 0 | OUTPATIENT
Start: 2018-12-14 | End: 2019-01-12

## 2018-12-14 RX ORDER — CEFUROXIME AXETIL 250 MG
1 TABLET ORAL
Qty: 10 | Refills: 0 | OUTPATIENT
Start: 2018-12-14 | End: 2018-12-18

## 2018-12-14 RX ORDER — FLUTICASONE FUROATE AND VILANTEROL TRIFENATATE 100; 25 UG/1; UG/1
1 POWDER RESPIRATORY (INHALATION)
Qty: 30 | Refills: 0 | OUTPATIENT
Start: 2018-12-14 | End: 2019-01-12

## 2018-12-14 RX ORDER — METFORMIN HYDROCHLORIDE 850 MG/1
4 TABLET ORAL
Qty: 0 | Refills: 0 | COMMUNITY

## 2018-12-14 RX ORDER — MAGNESIUM OXIDE 400 MG ORAL TABLET 241.3 MG
1 TABLET ORAL
Qty: 5 | Refills: 0 | OUTPATIENT
Start: 2018-12-14 | End: 2018-12-18

## 2018-12-14 RX ORDER — CEFUROXIME AXETIL 250 MG
1 TABLET ORAL
Qty: 6 | Refills: 0 | OUTPATIENT
Start: 2018-12-14 | End: 2018-12-16

## 2018-12-14 RX ORDER — BUDESONIDE, MICRONIZED 100 %
0.5 POWDER (GRAM) MISCELLANEOUS
Qty: 15 | Refills: 0 | OUTPATIENT
Start: 2018-12-14 | End: 2019-01-12

## 2018-12-14 RX ADMIN — Medication 50 MILLIGRAM(S): at 05:25

## 2018-12-14 RX ADMIN — Medication 3 MILLILITER(S): at 11:26

## 2018-12-14 RX ADMIN — Medication 500 MILLIGRAM(S): at 17:53

## 2018-12-14 RX ADMIN — AMLODIPINE BESYLATE 5 MILLIGRAM(S): 2.5 TABLET ORAL at 05:25

## 2018-12-14 RX ADMIN — Medication 1 PACKET(S): at 05:25

## 2018-12-14 RX ADMIN — Medication 200 MILLIGRAM(S): at 05:25

## 2018-12-14 RX ADMIN — Medication 3 MILLILITER(S): at 17:54

## 2018-12-14 RX ADMIN — MAGNESIUM OXIDE 400 MG ORAL TABLET 400 MILLIGRAM(S): 241.3 TABLET ORAL at 11:26

## 2018-12-14 RX ADMIN — Medication 0.5 MILLIGRAM(S): at 17:54

## 2018-12-14 RX ADMIN — LISINOPRIL 40 MILLIGRAM(S): 2.5 TABLET ORAL at 05:25

## 2018-12-14 RX ADMIN — Medication 0.5 MILLIGRAM(S): at 05:26

## 2018-12-14 RX ADMIN — Medication 1 TABLET(S): at 05:25

## 2018-12-14 RX ADMIN — Medication 500 MILLIGRAM(S): at 05:25

## 2018-12-14 RX ADMIN — Medication 3 MILLILITER(S): at 05:26

## 2018-12-14 RX ADMIN — INFLUENZA VIRUS VACCINE 0.5 MILLILITER(S): 15; 15; 15; 15 SUSPENSION INTRAMUSCULAR at 11:30

## 2018-12-14 RX ADMIN — PANTOPRAZOLE SODIUM 40 MILLIGRAM(S): 20 TABLET, DELAYED RELEASE ORAL at 06:01

## 2018-12-14 NOTE — PROGRESS NOTE ADULT - SUBJECTIVE AND OBJECTIVE BOX
CARDIOLOGY     PROGRESS  NOTE   ________________________________________________    CHIEF COMPLAINT:Patient is a 73y old  Female who presents with a chief complaint of Electrolyte disturbance - hypokalemia; hypocalcemia; hypophosphatemia; hypomagnesemia; (13 Dec 2018 11:54)    	  REVIEW OF SYSTEMS:  CONSTITUTIONAL: No fever, weight loss, or fatigue  EYES: No eye pain, visual disturbances, or discharge  ENT:  No difficulty hearing, tinnitus, vertigo; No sinus or throat pain  NECK: No pain or stiffness  RESPIRATORY: No cough, wheezing, chills or hemoptysis; No Shortness of Breath  CARDIOVASCULAR: No chest pain, palpitations, passing out, dizziness, or leg swelling  GASTROINTESTINAL: No abdominal or epigastric pain. No nausea, vomiting, or hematemesis; No diarrhea or constipation. No melena or hematochezia.  GENITOURINARY: No dysuria, frequency, hematuria, or incontinence  NEUROLOGICAL: No headaches, memory loss, loss of strength, numbness, or tremors  SKIN: No itching, burning, rashes, or lesions   LYMPH Nodes: No enlarged glands  ENDOCRINE: No heat or cold intolerance; No hair loss  MUSCULOSKELETAL: No joint pain or swelling; No muscle, back, or extremity pain  PSYCHIATRIC: No depression, anxiety, mood swings, or difficulty sleeping  HEME/LYMPH: No easy bruising, or bleeding gums  ALLERGY AND IMMUNOLOGIC: No hives or eczema	    [ ] All others negative	  [ ] Unable to obtain    PHYSICAL EXAM:  T(C): 36.7 (12-14-18 @ 05:14), Max: 36.7 (12-13-18 @ 21:43)  HR: 61 (12-14-18 @ 05:14) (61 - 78)  BP: 142/68 (12-14-18 @ 05:14) (118/73 - 142/84)  RR: 18 (12-14-18 @ 05:14) (18 - 18)  SpO2: 94% (12-14-18 @ 05:14) (86% - 95%)  Wt(kg): --  I&O's Summary    13 Dec 2018 07:01  -  14 Dec 2018 07:00  --------------------------------------------------------  IN: 1440 mL / OUT: 0 mL / NET: 1440 mL        Appearance: Normal	  HEENT:   Normal oral mucosa, PERRL, EOMI	  Lymphatic: No lymphadenopathy  Cardiovascular: Normal S1 S2, No JVD, No murmurs, No edema  Respiratory: Lungs clear to auscultation	  Psychiatry: A & O x 3, Mood & affect appropriate  Gastrointestinal:  Soft, Non-tender, + BS	  Skin: No rashes, No ecchymoses, No cyanosis	  Neurologic: Non-focal  Extremities: Normal range of motion, No clubbing, cyanosis or edema  Vascular: Peripheral pulses palpable 2+ bilaterally    MEDICATIONS  (STANDING):  ALBUTerol/ipratropium for Nebulization 3 milliLiter(s) Nebulizer every 6 hours  amLODIPine   Tablet 5 milliGRAM(s) Oral daily  atorvastatin 40 milliGRAM(s) Oral at bedtime  buDESOnide   0.5 milliGRAM(s) Respule 0.5 milliGRAM(s) Inhalation every 12 hours  calcium carbonate 1250 mG  + Vitamin D (OsCal 500 + D) 1 Tablet(s) Oral three times a day  cefuroxime   Tablet 500 milliGRAM(s) Oral every 12 hours  influenza   Vaccine 0.5 milliLiter(s) IntraMuscular once  lisinopril 40 milliGRAM(s) Oral daily  magnesium oxide 400 milliGRAM(s) Oral daily  metoprolol succinate  milliGRAM(s) Oral daily  pantoprazole    Tablet 40 milliGRAM(s) Oral before breakfast  potassium phosphate / sodium phosphate powder 1 Packet(s) Oral two times a day  predniSONE   Tablet 50 milliGRAM(s) Oral daily      TELEMETRY: 	    ECG:  	  RADIOLOGY:  OTHER: 	  	  LABS:	 	    CARDIAC MARKERS:            12-14    136  |  103  |  13  ----------------------------<  276<H>  4.9   |  20<L>  |  0.70    Ca    8.4      14 Dec 2018 07:13  Phos  1.9     12-14  Mg     1.7     12-14      proBNP:   Lipid Profile:   HgA1c: Hemoglobin A1C, Whole Blood: 7.7 % (12-13 @ 07:30)    TSH: Thyroid Stimulating Hormone, Serum: 0.44 uIU/mL (12-13 @ 08:12)  < from: CT Angio Chest w/ IV Cont (12.11.18 @ 12:58) >  CT angiogram of the chest was obtained following administration of  intravenous contrast. Approximately 90 cc of Omnipaque 350 was   administered and 10 cc was discarded. Coronal, sagittal and MIP images   were submitted for review.    Few lymph nodes are present in the pretracheal space and the AP window.    Heartis normal in size. Calcification the coronary arteries is noted. No   pericardial effusion is noted. Pulmonary arteries are normal in caliber.   No filling defects are noted.    Very large hiatal hernia is noted.    No endobronchial lesions are noted. 0.4 cm solid nodule is noted in the   right middle lobe. Centrilobular emphysema is noted bilaterally. No   pleural effusions are noted.    Below the diaphragm, visualized portions of the abdomen are unremarkable.     Degenerative changes of the spine are noted.    Impression: No pulmonary embolus is noted.    Very large hiatal hernia.    < end of copied text >          Assessment and plan  ---------------------------  hypoxemia ? sec to emphysema and large hital hernia  home o2  pt needs stress test

## 2018-12-14 NOTE — PROGRESS NOTE ADULT - REASON FOR ADMISSION
Hypokalemia and hypocalcemia

## 2018-12-14 NOTE — PROGRESS NOTE ADULT - ASSESSMENT
Discharge to home later today with home oxygen and inhalers. Electrolytes all corrected. See  pulmonary in one week. See med list and summary.

## 2018-12-14 NOTE — PROGRESS NOTE ADULT - SUBJECTIVE AND OBJECTIVE BOX
INTERVAL HPI/OVERNIGHT EVENTS:  Pt seen and examined at bedside.     Allergies/Intolerance: No Known Allergies      MEDICATIONS  (STANDING):  ALBUTerol/ipratropium for Nebulization 3 milliLiter(s) Nebulizer every 6 hours  amLODIPine   Tablet 5 milliGRAM(s) Oral daily  atorvastatin 40 milliGRAM(s) Oral at bedtime  buDESOnide   0.5 milliGRAM(s) Respule 0.5 milliGRAM(s) Inhalation every 12 hours  calcium carbonate 1250 mG  + Vitamin D (OsCal 500 + D) 1 Tablet(s) Oral three times a day  cefuroxime   Tablet 500 milliGRAM(s) Oral every 12 hours  influenza   Vaccine 0.5 milliLiter(s) IntraMuscular once  lisinopril 40 milliGRAM(s) Oral daily  magnesium oxide 400 milliGRAM(s) Oral daily  metoprolol succinate  milliGRAM(s) Oral daily  pantoprazole    Tablet 40 milliGRAM(s) Oral before breakfast  potassium phosphate / sodium phosphate powder 1 Packet(s) Oral two times a day  predniSONE   Tablet 50 milliGRAM(s) Oral daily    MEDICATIONS  (PRN):  aluminum hydroxide/magnesium hydroxide/simethicone Suspension 30 milliLiter(s) Oral every 4 hours PRN Dyspepsia        ROS: all systems reviewed and wnl      PHYSICAL EXAMINATION:  Vital Signs Last 24 Hrs  T(C): 36.7 (14 Dec 2018 05:14), Max: 36.7 (13 Dec 2018 21:43)  T(F): 98 (14 Dec 2018 05:14), Max: 98.1 (13 Dec 2018 21:43)  HR: 61 (14 Dec 2018 05:14) (61 - 78)  BP: 142/68 (14 Dec 2018 05:14) (118/73 - 142/84)  BP(mean): --  RR: 18 (14 Dec 2018 05:14) (18 - 18)  SpO2: 94% (14 Dec 2018 05:14) (86% - 95%)  CAPILLARY BLOOD GLUCOSE      POCT Blood Glucose.: 265 mg/dL (14 Dec 2018 07:56)  POCT Blood Glucose.: 134 mg/dL (13 Dec 2018 17:15)  POCT Blood Glucose.: 152 mg/dL (13 Dec 2018 12:15)      12-13 @ 07:01  -  12-14 @ 07:00  --------------------------------------------------------  IN: 1440 mL / OUT: 0 mL / NET: 1440 mL        GENERAL:   NECK: supple, No JVD  CHEST/LUNG: clear to auscultation bilaterally; no rales, rhonchi, or wheezing b/l  HEART: normal S1, S2  ABDOMEN: BS+, soft, ND, NT   EXTREMITIES:  pulses palpable; no clubbing, cyanosis, or edema b/l LEs  SKIN: no rashes or lesions      LABS:    12-14    136  |  103  |  13  ----------------------------<  276<H>  4.9   |  20<L>  |  0.70    Ca    8.4      14 Dec 2018 07:13  Phos  1.9     12-14  Mg     1.7     12-14 INTERVAL HPI/OVERNIGHT EVENTS:  Pt seen and examined at bedside.     Allergies/Intolerance: No Known Allergies      MEDICATIONS  (STANDING):  ALBUTerol/ipratropium for Nebulization 3 milliLiter(s) Nebulizer every 6 hours  amLODIPine   Tablet 5 milliGRAM(s) Oral daily  atorvastatin 40 milliGRAM(s) Oral at bedtime  buDESOnide   0.5 milliGRAM(s) Respule 0.5 milliGRAM(s) Inhalation every 12 hours  calcium carbonate 1250 mG  + Vitamin D (OsCal 500 + D) 1 Tablet(s) Oral three times a day  cefuroxime   Tablet 500 milliGRAM(s) Oral every 12 hours  influenza   Vaccine 0.5 milliLiter(s) IntraMuscular once  lisinopril 40 milliGRAM(s) Oral daily  magnesium oxide 400 milliGRAM(s) Oral daily  metoprolol succinate  milliGRAM(s) Oral daily  pantoprazole    Tablet 40 milliGRAM(s) Oral before breakfast  potassium phosphate / sodium phosphate powder 1 Packet(s) Oral two times a day  predniSONE   Tablet 50 milliGRAM(s) Oral daily    MEDICATIONS  (PRN):  aluminum hydroxide/magnesium hydroxide/simethicone Suspension 30 milliLiter(s) Oral every 4 hours PRN Dyspepsia        ROS: all systems reviewed and wnl      PHYSICAL EXAMINATION:  Vital Signs Last 24 Hrs  T(C): 36.7 (14 Dec 2018 05:14), Max: 36.7 (13 Dec 2018 21:43)  T(F): 98 (14 Dec 2018 05:14), Max: 98.1 (13 Dec 2018 21:43)  HR: 61 (14 Dec 2018 05:14) (61 - 78)  BP: 142/68 (14 Dec 2018 05:14) (118/73 - 142/84)  BP(mean): --  RR: 18 (14 Dec 2018 05:14) (18 - 18)  SpO2: 94% (14 Dec 2018 05:14) (86% - 95%)  CAPILLARY BLOOD GLUCOSE      POCT Blood Glucose.: 265 mg/dL (14 Dec 2018 07:56)  POCT Blood Glucose.: 134 mg/dL (13 Dec 2018 17:15)  POCT Blood Glucose.: 152 mg/dL (13 Dec 2018 12:15)      12-13 @ 07:01  -  12-14 @ 07:00  --------------------------------------------------------  IN: 1440 mL / OUT: 0 mL / NET: 1440 mL        GENERAL: feels better, no new complaints  NECK: supple, No JVD  CHEST/LUNG: clear to auscultation bilaterally; no rales, rhonchi, or wheezing b/l  HEART: normal S1, S2  ABDOMEN: BS+, soft, ND, NT   EXTREMITIES:  pulses palpable; no clubbing, cyanosis, or edema b/l LEs  SKIN: no rashes or lesions      LABS:    12-14    136  |  103  |  13  ----------------------------<  276<H>  4.9   |  20<L>  |  0.70    Ca    8.4      14 Dec 2018 07:13  Phos  1.9     12-14  Mg     1.7     12-14

## 2018-12-14 NOTE — PROGRESS NOTE ADULT - ASSESSMENT
ASSESSMENT:    multifactorial hypoxic respiratory failure:    1) moderate obstructive lung disease - suspect severe reduction in diffusing capacity given severe emphysema on chest CT  2) restrictive lung disease due to obesity and a very large hiatal hernia compressing the left lung     moderate pulmonary hypertension due to chronic hypoxemia    PLAN/RECOMMENDATIONS:    patient requires ATC oxygen supplementation to keep saturation greater than 88% - she is hesitant but willing to receive home oxygen supplies  and will need at time of dc   ambulation on 2lpm nasal canula  albuterol/atrovent/pulmicort nebs in hospital-  discharge on breo ellipta 200/25mcg 1 inhalation daily and incruse ellipta 62.5mcg 1 inhalation daily  prednisone 50mg daily x 3 days with taper by 10 mg q 3 days.  pt to monitor glucose, oral intake  ceftin 500mg 2 times daily x 5 days  cardiology follow-up noted  cardiac meds: lipitor/norvasc/lisinopril/toprol XL  replete lytes which are abnormal for unclear reasons  consider endocrine evaluation  outpatient full PFTs  GI evaluation in the outpatient setting - the patient's pulmonary function and reflux symptoms would both both improve with surgical correction of her very large hiatal hernia     Plan of care reviewed with the patient and her  at bedside at great length.  She will follow with both her GI physician- Dr Wilson- and with me in our office in 2-3 weeks    Larissa Fields MD, [facility] - [phone]  Pulmonary Medicine

## 2018-12-14 NOTE — PROGRESS NOTE ADULT - SUBJECTIVE AND OBJECTIVE BOX
Follow-up Pulm Progress Note    No new respiratory events overnight.  Denies increased SOB, chest pain, cough or mucus.    Medications:  MEDICATIONS  (STANDING):  ALBUTerol/ipratropium for Nebulization 3 milliLiter(s) Nebulizer every 6 hours  amLODIPine   Tablet 5 milliGRAM(s) Oral daily  atorvastatin 40 milliGRAM(s) Oral at bedtime  buDESOnide   0.5 milliGRAM(s) Respule 0.5 milliGRAM(s) Inhalation every 12 hours  calcium carbonate 1250 mG  + Vitamin D (OsCal 500 + D) 1 Tablet(s) Oral daily  cefuroxime   Tablet 500 milliGRAM(s) Oral every 12 hours  influenza   Vaccine 0.5 milliLiter(s) IntraMuscular once  lisinopril 40 milliGRAM(s) Oral daily  magnesium oxide 400 milliGRAM(s) Oral daily  metoprolol succinate  milliGRAM(s) Oral daily  pantoprazole    Tablet 40 milliGRAM(s) Oral before breakfast  predniSONE   Tablet 50 milliGRAM(s) Oral daily    MEDICATIONS  (PRN):  aluminum hydroxide/magnesium hydroxide/simethicone Suspension 30 milliLiter(s) Oral every 4 hours PRN Dyspepsia      Vent settings (if applicable)      Vital Signs Last 24 Hrs  T(C): 36.7 (14 Dec 2018 05:14), Max: 36.7 (13 Dec 2018 21:43)  T(F): 98 (14 Dec 2018 05:14), Max: 98.1 (13 Dec 2018 21:43)  HR: 61 (14 Dec 2018 05:14) (61 - 78)  BP: 142/68 (14 Dec 2018 05:14) (118/73 - 142/84)  BP(mean): --  RR: 18 (14 Dec 2018 05:14) (18 - 18)  SpO2: 94% (14 Dec 2018 05:14) (86% - 95%)          12-13 @ 07:01  -  12-14 @ 07:00  --------------------------------------------------------  IN: 1440 mL / OUT: 0 mL / NET: 1440 mL          LABS:    12-14    136  |  103  |  13  ----------------------------<  276<H>  4.9   |  20<L>  |  0.70    Ca    8.4      14 Dec 2018 07:13  Phos  1.9     12-14  Mg     1.7     12-14            CAPILLARY BLOOD GLUCOSE      POCT Blood Glucose.: 265 mg/dL (14 Dec 2018 07:56)              CULTURES:        Physical Examination:  Awake and alert, generally comfortable  HEENT: unremarkable  PULM: decreased to auscultation bilaterally, no significant sputum production  CVS: Regular rate and rhythm, no murmurs, rubs, or gallops  Abd:  soft, non tender  Extrem: No CCE    RADIOLOGY REVIEWED  CXR:    CT chest:

## 2019-01-04 ENCOUNTER — INPATIENT (INPATIENT)
Facility: HOSPITAL | Age: 74
LOS: 3 days | Discharge: HOME | DRG: 641 | End: 2019-01-08
Attending: INTERNAL MEDICINE | Admitting: INTERNAL MEDICINE
Payer: MEDICARE

## 2019-01-04 VITALS
SYSTOLIC BLOOD PRESSURE: 154 MMHG | WEIGHT: 143.96 LBS | OXYGEN SATURATION: 95 % | HEIGHT: 62 IN | HEART RATE: 82 BPM | DIASTOLIC BLOOD PRESSURE: 84 MMHG | RESPIRATION RATE: 18 BRPM

## 2019-01-04 DIAGNOSIS — E87.6 HYPOKALEMIA: ICD-10-CM

## 2019-01-04 DIAGNOSIS — I10 ESSENTIAL (PRIMARY) HYPERTENSION: ICD-10-CM

## 2019-01-04 DIAGNOSIS — E83.42 HYPOMAGNESEMIA: ICD-10-CM

## 2019-01-04 PROBLEM — E78.5 HYPERLIPIDEMIA, UNSPECIFIED: Chronic | Status: ACTIVE | Noted: 2018-12-06

## 2019-01-04 PROBLEM — E11.9 TYPE 2 DIABETES MELLITUS WITHOUT COMPLICATIONS: Chronic | Status: ACTIVE | Noted: 2018-12-06

## 2019-01-04 LAB
ALBUMIN SERPL ELPH-MCNC: 3.9 G/DL — SIGNIFICANT CHANGE UP (ref 3.3–5)
ALP SERPL-CCNC: 57 U/L — SIGNIFICANT CHANGE UP (ref 40–120)
ALT FLD-CCNC: 18 U/L — SIGNIFICANT CHANGE UP (ref 10–45)
ANION GAP SERPL CALC-SCNC: 21 MMOL/L — HIGH (ref 5–17)
ANION GAP SERPL CALC-SCNC: 21 MMOL/L — HIGH (ref 5–17)
APPEARANCE UR: CLEAR — SIGNIFICANT CHANGE UP
APTT BLD: 25 SEC — LOW (ref 27.5–36.3)
AST SERPL-CCNC: 12 U/L — SIGNIFICANT CHANGE UP (ref 10–40)
BACTERIA # UR AUTO: ABNORMAL
BASE EXCESS BLDV CALC-SCNC: 1.8 MMOL/L — SIGNIFICANT CHANGE UP (ref -2–2)
BASOPHILS # BLD AUTO: 0.1 K/UL — SIGNIFICANT CHANGE UP (ref 0–0.2)
BASOPHILS NFR BLD AUTO: 0.6 % — SIGNIFICANT CHANGE UP (ref 0–2)
BILIRUB SERPL-MCNC: 0.6 MG/DL — SIGNIFICANT CHANGE UP (ref 0.2–1.2)
BILIRUB UR-MCNC: NEGATIVE — SIGNIFICANT CHANGE UP
BUN SERPL-MCNC: 14 MG/DL — SIGNIFICANT CHANGE UP (ref 7–23)
BUN SERPL-MCNC: 18 MG/DL — SIGNIFICANT CHANGE UP (ref 7–23)
CA-I SERPL-SCNC: 1.03 MMOL/L — LOW (ref 1.12–1.3)
CALCIUM SERPL-MCNC: 8 MG/DL — LOW (ref 8.4–10.5)
CALCIUM SERPL-MCNC: 8.4 MG/DL — SIGNIFICANT CHANGE UP (ref 8.4–10.5)
CHLORIDE BLDV-SCNC: 105 MMOL/L — SIGNIFICANT CHANGE UP (ref 96–108)
CHLORIDE SERPL-SCNC: 97 MMOL/L — SIGNIFICANT CHANGE UP (ref 96–108)
CHLORIDE SERPL-SCNC: 99 MMOL/L — SIGNIFICANT CHANGE UP (ref 96–108)
CK SERPL-CCNC: 17 U/L — LOW (ref 25–170)
CO2 BLDV-SCNC: 25 MMOL/L — SIGNIFICANT CHANGE UP (ref 22–30)
CO2 SERPL-SCNC: 18 MMOL/L — LOW (ref 22–31)
CO2 SERPL-SCNC: 21 MMOL/L — LOW (ref 22–31)
COLOR SPEC: YELLOW — SIGNIFICANT CHANGE UP
CREAT SERPL-MCNC: 0.82 MG/DL — SIGNIFICANT CHANGE UP (ref 0.5–1.3)
CREAT SERPL-MCNC: 0.83 MG/DL — SIGNIFICANT CHANGE UP (ref 0.5–1.3)
DIFF PNL FLD: ABNORMAL
EOSINOPHIL # BLD AUTO: 0.1 K/UL — SIGNIFICANT CHANGE UP (ref 0–0.5)
EOSINOPHIL NFR BLD AUTO: 0.9 % — SIGNIFICANT CHANGE UP (ref 0–6)
EPI CELLS # UR: 6 /HPF — HIGH
GAS PNL BLDV: 135 MMOL/L — LOW (ref 136–145)
GAS PNL BLDV: SIGNIFICANT CHANGE UP
GAS PNL BLDV: SIGNIFICANT CHANGE UP
GLUCOSE BLDC GLUCOMTR-MCNC: 219 MG/DL — HIGH (ref 70–99)
GLUCOSE BLDV-MCNC: 178 MG/DL — HIGH (ref 70–99)
GLUCOSE SERPL-MCNC: 184 MG/DL — HIGH (ref 70–99)
GLUCOSE SERPL-MCNC: 233 MG/DL — HIGH (ref 70–99)
GLUCOSE UR QL: NEGATIVE — SIGNIFICANT CHANGE UP
HCO3 BLDV-SCNC: 24 MMOL/L — SIGNIFICANT CHANGE UP (ref 21–29)
HCT VFR BLD CALC: 37.8 % — SIGNIFICANT CHANGE UP (ref 34.5–45)
HCT VFR BLDA CALC: 38 % — LOW (ref 39–50)
HGB BLD CALC-MCNC: 12.5 G/DL — SIGNIFICANT CHANGE UP (ref 11.5–15.5)
HGB BLD-MCNC: 12.7 G/DL — SIGNIFICANT CHANGE UP (ref 11.5–15.5)
HYALINE CASTS # UR AUTO: 1 /LPF — SIGNIFICANT CHANGE UP (ref 0–2)
INR BLD: 1.28 RATIO — HIGH (ref 0.88–1.16)
KETONES UR-MCNC: ABNORMAL
LACTATE BLDV-MCNC: 1.8 MMOL/L — SIGNIFICANT CHANGE UP (ref 0.7–2)
LEUKOCYTE ESTERASE UR-ACNC: ABNORMAL
LIDOCAIN IGE QN: 40 U/L — SIGNIFICANT CHANGE UP (ref 7–60)
LYMPHOCYTES # BLD AUTO: 1.7 K/UL — SIGNIFICANT CHANGE UP (ref 1–3.3)
LYMPHOCYTES # BLD AUTO: 13.4 % — SIGNIFICANT CHANGE UP (ref 13–44)
MCHC RBC-ENTMCNC: 30.3 PG — SIGNIFICANT CHANGE UP (ref 27–34)
MCHC RBC-ENTMCNC: 33.7 GM/DL — SIGNIFICANT CHANGE UP (ref 32–36)
MCV RBC AUTO: 89.8 FL — SIGNIFICANT CHANGE UP (ref 80–100)
MONOCYTES # BLD AUTO: 0.7 K/UL — SIGNIFICANT CHANGE UP (ref 0–0.9)
MONOCYTES NFR BLD AUTO: 5.4 % — SIGNIFICANT CHANGE UP (ref 2–14)
NEUTROPHILS # BLD AUTO: 10.1 K/UL — HIGH (ref 1.8–7.4)
NEUTROPHILS NFR BLD AUTO: 79.7 % — HIGH (ref 43–77)
NITRITE UR-MCNC: NEGATIVE — SIGNIFICANT CHANGE UP
PCO2 BLDV: 32 MMHG — LOW (ref 35–50)
PH BLDV: 7.49 — HIGH (ref 7.35–7.45)
PH UR: 6 — SIGNIFICANT CHANGE UP (ref 5–8)
PLATELET # BLD AUTO: 273 K/UL — SIGNIFICANT CHANGE UP (ref 150–400)
PO2 BLDV: 43 MMHG — SIGNIFICANT CHANGE UP (ref 25–45)
POTASSIUM BLDV-SCNC: 2.7 MMOL/L — CRITICAL LOW (ref 3.5–5.3)
POTASSIUM SERPL-MCNC: 2.9 MMOL/L — CRITICAL LOW (ref 3.5–5.3)
POTASSIUM SERPL-MCNC: 3.7 MMOL/L — SIGNIFICANT CHANGE UP (ref 3.5–5.3)
POTASSIUM SERPL-SCNC: 2.9 MMOL/L — CRITICAL LOW (ref 3.5–5.3)
POTASSIUM SERPL-SCNC: 3.7 MMOL/L — SIGNIFICANT CHANGE UP (ref 3.5–5.3)
PROT SERPL-MCNC: 7.1 G/DL — SIGNIFICANT CHANGE UP (ref 6–8.3)
PROT UR-MCNC: ABNORMAL
PROTHROM AB SERPL-ACNC: 14.8 SEC — HIGH (ref 10–12.9)
RBC # BLD: 4.2 M/UL — SIGNIFICANT CHANGE UP (ref 3.8–5.2)
RBC # FLD: 13.7 % — SIGNIFICANT CHANGE UP (ref 10.3–14.5)
RBC CASTS # UR COMP ASSIST: 4 /HPF — SIGNIFICANT CHANGE UP (ref 0–4)
SAO2 % BLDV: 76 % — SIGNIFICANT CHANGE UP (ref 67–88)
SODIUM SERPL-SCNC: 138 MMOL/L — SIGNIFICANT CHANGE UP (ref 135–145)
SODIUM SERPL-SCNC: 139 MMOL/L — SIGNIFICANT CHANGE UP (ref 135–145)
SP GR SPEC: 1.03 — HIGH (ref 1.01–1.02)
TROPONIN T, HIGH SENSITIVITY RESULT: 15 NG/L — SIGNIFICANT CHANGE UP (ref 0–51)
UROBILINOGEN FLD QL: ABNORMAL
WBC # BLD: 12.7 K/UL — HIGH (ref 3.8–10.5)
WBC # FLD AUTO: 12.7 K/UL — HIGH (ref 3.8–10.5)
WBC UR QL: 15 /HPF — HIGH (ref 0–5)

## 2019-01-04 PROCEDURE — 99285 EMERGENCY DEPT VISIT HI MDM: CPT

## 2019-01-04 PROCEDURE — 70450 CT HEAD/BRAIN W/O DYE: CPT | Mod: 26

## 2019-01-04 RX ORDER — MECLIZINE HCL 12.5 MG
25 TABLET ORAL ONCE
Qty: 0 | Refills: 0 | Status: COMPLETED | OUTPATIENT
Start: 2019-01-04 | End: 2019-01-04

## 2019-01-04 RX ORDER — ATORVASTATIN CALCIUM 80 MG/1
40 TABLET, FILM COATED ORAL AT BEDTIME
Qty: 0 | Refills: 0 | Status: DISCONTINUED | OUTPATIENT
Start: 2019-01-04 | End: 2019-01-08

## 2019-01-04 RX ORDER — SODIUM CHLORIDE 9 MG/ML
1000 INJECTION, SOLUTION INTRAVENOUS
Qty: 0 | Refills: 0 | Status: DISCONTINUED | OUTPATIENT
Start: 2019-01-04 | End: 2019-01-08

## 2019-01-04 RX ORDER — MAGNESIUM SULFATE 500 MG/ML
2 VIAL (ML) INJECTION ONCE
Qty: 0 | Refills: 0 | Status: COMPLETED | OUTPATIENT
Start: 2019-01-04 | End: 2019-01-04

## 2019-01-04 RX ORDER — DEXTROSE 50 % IN WATER 50 %
25 SYRINGE (ML) INTRAVENOUS ONCE
Qty: 0 | Refills: 0 | Status: DISCONTINUED | OUTPATIENT
Start: 2019-01-04 | End: 2019-01-08

## 2019-01-04 RX ORDER — HEPARIN SODIUM 5000 [USP'U]/ML
5000 INJECTION INTRAVENOUS; SUBCUTANEOUS EVERY 12 HOURS
Qty: 0 | Refills: 0 | Status: DISCONTINUED | OUTPATIENT
Start: 2019-01-04 | End: 2019-01-08

## 2019-01-04 RX ORDER — MAGNESIUM OXIDE 400 MG ORAL TABLET 241.3 MG
400 TABLET ORAL
Qty: 0 | Refills: 0 | Status: DISCONTINUED | OUTPATIENT
Start: 2019-01-04 | End: 2019-01-05

## 2019-01-04 RX ORDER — DEXTROSE 50 % IN WATER 50 %
15 SYRINGE (ML) INTRAVENOUS ONCE
Qty: 0 | Refills: 0 | Status: DISCONTINUED | OUTPATIENT
Start: 2019-01-04 | End: 2019-01-08

## 2019-01-04 RX ORDER — INSULIN LISPRO 100/ML
VIAL (ML) SUBCUTANEOUS AT BEDTIME
Qty: 0 | Refills: 0 | Status: DISCONTINUED | OUTPATIENT
Start: 2019-01-04 | End: 2019-01-08

## 2019-01-04 RX ORDER — POTASSIUM CHLORIDE 20 MEQ
40 PACKET (EA) ORAL ONCE
Qty: 0 | Refills: 0 | Status: COMPLETED | OUTPATIENT
Start: 2019-01-04 | End: 2019-01-04

## 2019-01-04 RX ORDER — ALPRAZOLAM 0.25 MG
0.5 TABLET ORAL ONCE
Qty: 0 | Refills: 0 | Status: DISCONTINUED | OUTPATIENT
Start: 2019-01-04 | End: 2019-01-04

## 2019-01-04 RX ORDER — ALBUTEROL 90 UG/1
2 AEROSOL, METERED ORAL EVERY 8 HOURS
Qty: 0 | Refills: 0 | Status: DISCONTINUED | OUTPATIENT
Start: 2019-01-04 | End: 2019-01-05

## 2019-01-04 RX ORDER — GLUCAGON INJECTION, SOLUTION 0.5 MG/.1ML
1 INJECTION, SOLUTION SUBCUTANEOUS ONCE
Qty: 0 | Refills: 0 | Status: DISCONTINUED | OUTPATIENT
Start: 2019-01-04 | End: 2019-01-08

## 2019-01-04 RX ORDER — POTASSIUM CHLORIDE 20 MEQ
10 PACKET (EA) ORAL
Qty: 0 | Refills: 0 | Status: COMPLETED | OUTPATIENT
Start: 2019-01-04 | End: 2019-01-04

## 2019-01-04 RX ORDER — DEXTROSE 50 % IN WATER 50 %
12.5 SYRINGE (ML) INTRAVENOUS ONCE
Qty: 0 | Refills: 0 | Status: DISCONTINUED | OUTPATIENT
Start: 2019-01-04 | End: 2019-01-08

## 2019-01-04 RX ORDER — METOPROLOL TARTRATE 50 MG
200 TABLET ORAL DAILY
Qty: 0 | Refills: 0 | Status: DISCONTINUED | OUTPATIENT
Start: 2019-01-04 | End: 2019-01-08

## 2019-01-04 RX ORDER — INSULIN LISPRO 100/ML
VIAL (ML) SUBCUTANEOUS
Qty: 0 | Refills: 0 | Status: DISCONTINUED | OUTPATIENT
Start: 2019-01-04 | End: 2019-01-08

## 2019-01-04 RX ORDER — ACETAMINOPHEN 500 MG
650 TABLET ORAL ONCE
Qty: 0 | Refills: 0 | Status: COMPLETED | OUTPATIENT
Start: 2019-01-04 | End: 2019-01-04

## 2019-01-04 RX ORDER — METOCLOPRAMIDE HCL 10 MG
10 TABLET ORAL ONCE
Qty: 0 | Refills: 0 | Status: COMPLETED | OUTPATIENT
Start: 2019-01-04 | End: 2019-01-04

## 2019-01-04 RX ORDER — SODIUM CHLORIDE 9 MG/ML
500 INJECTION INTRAMUSCULAR; INTRAVENOUS; SUBCUTANEOUS ONCE
Qty: 0 | Refills: 0 | Status: COMPLETED | OUTPATIENT
Start: 2019-01-04 | End: 2019-01-04

## 2019-01-04 RX ORDER — CEFTRIAXONE 500 MG/1
1 INJECTION, POWDER, FOR SOLUTION INTRAMUSCULAR; INTRAVENOUS ONCE
Qty: 0 | Refills: 0 | Status: COMPLETED | OUTPATIENT
Start: 2019-01-04 | End: 2019-01-04

## 2019-01-04 RX ORDER — MAGNESIUM OXIDE 400 MG ORAL TABLET 241.3 MG
800 TABLET ORAL ONCE
Qty: 0 | Refills: 0 | Status: COMPLETED | OUTPATIENT
Start: 2019-01-04 | End: 2019-01-04

## 2019-01-04 RX ADMIN — Medication 100 MILLIEQUIVALENT(S): at 13:20

## 2019-01-04 RX ADMIN — MAGNESIUM OXIDE 400 MG ORAL TABLET 800 MILLIGRAM(S): 241.3 TABLET ORAL at 14:33

## 2019-01-04 RX ADMIN — Medication 50 GRAM(S): at 14:34

## 2019-01-04 RX ADMIN — ALBUTEROL 2 PUFF(S): 90 AEROSOL, METERED ORAL at 23:59

## 2019-01-04 RX ADMIN — Medication 10 MILLIGRAM(S): at 12:49

## 2019-01-04 RX ADMIN — CEFTRIAXONE 100 GRAM(S): 500 INJECTION, POWDER, FOR SOLUTION INTRAMUSCULAR; INTRAVENOUS at 20:39

## 2019-01-04 RX ADMIN — Medication 25 MILLIGRAM(S): at 12:53

## 2019-01-04 RX ADMIN — Medication 650 MILLIGRAM(S): at 22:27

## 2019-01-04 RX ADMIN — Medication 100 MILLIEQUIVALENT(S): at 18:58

## 2019-01-04 RX ADMIN — HEPARIN SODIUM 5000 UNIT(S): 5000 INJECTION INTRAVENOUS; SUBCUTANEOUS at 22:48

## 2019-01-04 RX ADMIN — Medication 100 MILLIEQUIVALENT(S): at 14:36

## 2019-01-04 RX ADMIN — Medication 40 MILLIEQUIVALENT(S): at 13:20

## 2019-01-04 RX ADMIN — ATORVASTATIN CALCIUM 40 MILLIGRAM(S): 80 TABLET, FILM COATED ORAL at 22:46

## 2019-01-04 RX ADMIN — SODIUM CHLORIDE 500 MILLILITER(S): 9 INJECTION INTRAMUSCULAR; INTRAVENOUS; SUBCUTANEOUS at 13:09

## 2019-01-04 RX ADMIN — Medication 0.5 MILLIGRAM(S): at 18:20

## 2019-01-04 NOTE — H&P ADULT - NSHPLABSRESULTS_GEN_ALL_CORE
LABS:                        12.7   12.7  )-----------( 273      ( 04 Jan 2019 12:50 )             37.8     01-04    139  |  97  |  18  ----------------------------<  184<H>  2.9<LL>   |  21<L>  |  0.83    Ca    8.4      04 Jan 2019 12:51  Phos  3.1     01-04  Mg     <.6     01-04    TPro  7.1  /  Alb  3.9  /  TBili  0.6  /  DBili  x   /  AST  12  /  ALT  18  /  AlkPhos  57  01-04    PT/INR - ( 04 Jan 2019 12:50 )   PT: 14.8 sec;   INR: 1.28 ratio         PTT - ( 04 Jan 2019 12:50 )  PTT:25.0 sec  CARDIAC MARKERS ( 04 Jan 2019 12:51 )  x     / x     / 17 U/L / x     / x                01-04 @ 12:50  2.7  43    Hemoglobin A1C, Whole Blood: 7.7 % (12-13 @ 07:30)    Thyroid Stimulating Hormone, Serum: 0.44 uIU/mL (12-13 @ 08:12)

## 2019-01-04 NOTE — CONSULT NOTE ADULT - PROBLEM SELECTOR RECOMMENDATION 2
Etiology?  Possible GI loss/poor PO intake  Supplement MgSO4 2gm IV one dose  Monitor Mg level Etiology?  Possible GI loss/poor PO intake  Supplement MgSO4 2gm IV one dose  Monitor Mg level  Urine Mg, 24 hrs urine for Mg

## 2019-01-04 NOTE — CONSULT NOTE ADULT - ASSESSMENT
pt is well known to me with hx of htn ,emphysema, on home o2 with electrolyte imbalance  check renin/hiram level  renal/endocrine eval  repletre lytes  r/o arrythmia tele  orthostatic bp  check o2 ? compliance  add aldactone and decrease beta blocker

## 2019-01-04 NOTE — ED PROVIDER NOTE - ATTENDING CONTRIBUTION TO CARE
Pt with tremors and lightheadedness with now inability to walk today.  Pt tremulous, dizzy worse with position with horizontal nystagmus, 5/5 motor UE and LE.  Not stroke/tpa candidate, consider metabolic, neurological, and infectious causes.  No signs for meningitis.

## 2019-01-04 NOTE — ED ADULT NURSE REASSESSMENT NOTE - NS ED NURSE REASSESS COMMENT FT1
1800 Patient again became very anxious, trembling and hyperventilating c/o "panic attack". REBECCA Zamora made aware and patient given PO xanax.

## 2019-01-04 NOTE — ED ADULT NURSE REASSESSMENT NOTE - NS ED NURSE REASSESS COMMENT FT1
Patient c/o feeling dizzy, then became very anxious, trembling, hyperventilating and stating "I'm having a panic attack". REBECCA Zaomra was made aware and EKG was performed. Heart rate noted to be in 170's. As per MARIJA room tech and EKG, sinus tachycardia noted. Calming measures and emotional support offered. Patient eventually became calm and v/s improved without meds. 1735 Patient c/o feeling dizzy, then became very anxious, trembling, hyperventilating and stating "I'm having a panic attack". REBECCA Zamora was made aware and EKG was performed. Heart rate noted to be in 170's. As per MARIJA room tech and EKG, sinus tachycardia noted. Calming measures and emotional support offered. Patient eventually became calm and v/s improved without meds.

## 2019-01-04 NOTE — ED ADULT NURSE REASSESSMENT NOTE - NS ED NURSE REASSESS COMMENT FT1
Report received from Rashida TILLEY. Patient is a/xo3, resting in stretcher bed with . CM in place HR 120s sinus tach. Patient awaiting admission at this time. NAD, no shortness of breath, chest pain, abdomen pain, n/v/d. No acute distress. Will give report to holding RN.

## 2019-01-04 NOTE — H&P ADULT - ASSESSMENT
73 , PMH        HTN, HLD  , DM(II)  ,   sent  to  er  for hypokalemia and hypocalcemia./  supplemented  potassium of  2.9/  Mg of  0.6  pt  has  a prior  h/o  of  this   on diuretics  mild  elevated  wbc  from prednisone     Toprol XL /, Norvasc/ Lisinopril    COPD:  , old  ct  with . Mild centrolobular  emphysema   dvt ppx   lans  in am 73 , PMH        HTN, HLD  , DM(II)  ,   sent  to  er  for hypokalemia and hypocalcemia./  supplemented  potassium of  2.9/  Mg of  0.6  pt  has  a prior  h/o  of  this   on diuretics  mild  elevated  wbc  from prednisone     Toprol XL /, Norvasc/ Lisinopril    COPD:  , old  ct  with . Mild centrolobular  emphysema   dvt ppx   labs   in am        < from: CT Angio Chest w/ IV Cont (12.11.18 @ 12:58) >  INTERPRETATION:  Clinical information: Hypoxia. Evaluate for pulmonary   embolus.    CT angiogram of the chest was obtained following administration of  intravenous contrast. Approximately 90 cc of Omnipaque 350 was   administered and 10 cc was discarded. Coronal, sagittal and MIP images   were submitted for review.    Few lymph nodes are present in the pretracheal space and the AP window.  Heartis normal in size. Calcification the coronary arteries is noted. No   pericardial effusion is noted. Pulmonary arteries are normal in caliber.   No filling defects are noted.  Very large hiatal hernia is noted.  No endobronchial lesions are noted. 0.4 cm solid nodule is noted in the   right middle lobe. Centrilobular emphysema is noted bilaterally. No   pleural effusions are noted.  Below the diaphragm, visualized portions of the abdomen are unremarkable.   Degenerative changes of the spine are noted.  Impression: o pulmonary embolus is noted.  Very large hiatal hernia.  < end of copied text >      < from: Transthoracic Echocardiogram (12.12.18 @ 15:46) >  1. Normal mitral valve. Minimal mitral regurgitation.  2. Normal trileaflet aortic valve.  3. Normal left ventricular internal dimensions and wall  thicknesses.  4. Normal left ventricular systolic function. No segmental  wall motion abnormalities.  5. Normal right ventricular size and function.  6. Estimated right ventricular systolic pressure equals 49  mm Hg, assuming right atrial pressure equals 8 mm Hg,  consistent with mild pulmonary hypertension.  7. Agitated saline injection demonstrates no evidence of a  patent foramen ovale.  *** No previous Echo exam.    < end of copied text > 73 , PMH        HTN, HLD  , DM(II)  ,   sent  to  er  for hypokalemia and hypocalcemia./  supplemented  also  pt  c/o  dizziness,  constant   ct head, pending    potassium of  2.9/  Mg of  0.6  pt  has  a prior  h/o  of  this   not  on diuretics  mild  elevated  wbc  from prednisone./ has  completed  course  card eval/  was told  she needs  a  stress test/   had  carotid  dopplers  with  op  card office, which  was  normal  pt  not  willing  to  proceed  with  stress during  this  admission  at present, although   wants  he r to  do  it/ they  will  decide     Toprol XL /, Norvasc/ Lisinopril    COPD:  , old  ct  with . Mild centrolobular  emphysema   dvt ppx   labs   in am      < from: CT Angio Chest w/ IV Cont (12.11.18 @ 12:58) >  INTERPRETATION:  Clinical information: Hypoxia. Evaluate for pulmonary   embolus.  CT angiogram of the chest was obtained following administration of  intravenous contrast. Approximately 90 cc of Omnipaque 350 was   administered and 10 cc was discarded. Coronal, sagittal and MIP images   were submitted for review.  Few lymph nodes are present in the pretracheal space and the AP window.  Heartis normal in size. Calcification the coronary arteries is noted. No   pericardial effusion is noted. Pulmonary arteries are normal in caliber.   No filling defects are noted.  Very large hiatal hernia is noted.  No endobronchial lesions are noted. 0.4 cm solid nodule is noted in the   right middle lobe. Centrilobular emphysema is noted bilaterally. No   pleural effusions are noted.  Below the diaphragm, visualized portions of the abdomen are unremarkable.   Degenerative changes of the spine are noted.  Impression: o pulmonary embolus is noted.  Very large hiatal hernia.  < end of copied text >      < from: Transthoracic Echocardiogram (12.12.18 @ 15:46) >  1. Normal mitral valve. Minimal mitral regurgitation.  2. Normal trileaflet aortic valve.  3. Normal left ventricular internal dimensions and wall  thicknesses.  4. Normal left ventricular systolic function. No segmental  wall motion abnormalities.  5. Normal right ventricular size and function.  6. Estimated right ventricular systolic pressure equals 49  mm Hg, assuming right atrial pressure equals 8 mm Hg,  consistent with mild pulmonary hypertension.  7. Agitated saline injection demonstrates no evidence of a  patent foramen ovale.  *** No previous Echo exam.    < end of copied text >

## 2019-01-04 NOTE — CONSULT NOTE ADULT - SUBJECTIVE AND OBJECTIVE BOX
CHIEF COMPLAINT:Patient is a 73y old  Female who presents with a chief complaint of dizzy (04 Jan 2019 15:40)      HPI: pt is well known to me.  72 yo F with pmhx   COPD(on home O2 and prednisone 5mg) , htn  , hld  and dm  2, presenting with dizziness x three days . patient states symptoms began all of a sudden with lightheadedness and dizziness "like the room is spinning" for three days . Patient states she has associated nv and blurred vision with her symptoms . Patient states recently started on prednisone for COPD. Patient unsure what brings on her symptoms but symptoms are intermittent.  . Patient denies fever, cough, cp, sob, abd pain, tingling, numbness, weakness, syncope, aphasia, dysuria and hematuria (04 Jan 2019 15:40)      PAST MEDICAL & SURGICAL HISTORY:  HLD (hyperlipidemia)  DM (diabetes mellitus)  HTN (hypertension)  No significant past surgical history      MEDICATIONS  (STANDING):  ALBUTerol    90 MICROgram(s) HFA Inhaler 2 Puff(s) Inhalation every 8 hours  atorvastatin 40 milliGRAM(s) Oral at bedtime  cefTRIAXone   IVPB 1 Gram(s) IV Intermittent once  heparin  Injectable 5000 Unit(s) SubCutaneous every 12 hours  magnesium oxide 400 milliGRAM(s) Oral two times a day with meals  metoprolol succinate  milliGRAM(s) Oral daily  potassium chloride    Tablet ER 40 milliEquivalent(s) Oral once  potassium chloride  10 mEq/100 mL IVPB 10 milliEquivalent(s) IV Intermittent every 1 hour    MEDICATIONS  (PRN):      FAMILY HISTORY:  No pertinent family history in first degree relatives      SOCIAL HISTORY:    [ ] Non-smoker  [ ] Smoker  [ ] Alcohol    Allergies    No Known Allergies    Intolerances    	    REVIEW OF SYSTEMS:  CONSTITUTIONAL: No fever, weight loss, or fatigue  EYES: No eye pain, visual disturbances, or discharge  ENT:  No difficulty hearing, tinnitus, vertigo; No sinus or throat pain  NECK: No pain or stiffness  RESPIRATORY: No cough, wheezing, chills or hemoptysis; + Shortness of Breath  CARDIOVASCULAR: No chest pain, palpitations, passing out, dizziness, or leg swelling  GASTROINTESTINAL: No abdominal or epigastric pain. No nausea, vomiting, or hematemesis; No diarrhea or constipation. No melena or hematochezia.  GENITOURINARY: No dysuria, frequency, hematuria, or incontinence  NEUROLOGICAL: No headaches, memory loss, loss of strength, numbness, or tremors  SKIN: No itching, burning, rashes, or lesions   LYMPH Nodes: No enlarged glands  ENDOCRINE: No heat or cold intolerance; No hair loss  MUSCULOSKELETAL: No joint pain or swelling; No muscle, back, or extremity pain  PSYCHIATRIC: No depression, anxiety, mood swings, or difficulty sleeping  HEME/LYMPH: No easy bruising, or bleeding gums  ALLERGY AND IMMUNOLOGIC: No hives or eczema	    [ ] All others negative	  [ ] Unable to obtain    PHYSICAL EXAM:  T(C): 36.9 (01-04-19 @ 15:45), Max: 37 (01-04-19 @ 12:20)  HR: 84 (01-04-19 @ 15:45) (74 - 85)  BP: 117/68 (01-04-19 @ 15:45) (117/68 - 154/84)  RR: 18 (01-04-19 @ 15:45) (18 - 18)  SpO2: 94% (01-04-19 @ 15:45) (93% - 95%)  Wt(kg): --  I&O's Summary      Appearance: Normal	  HEENT:   Normal oral mucosa, PERRL, EOMI	  Lymphatic: No lymphadenopathy  Cardiovascular: Normal S1 S2, No JVD, + murmurs, No edema  Respiratory: Lungs clear to auscultation	  Psychiatry: A & O x 3, Mood & affect appropriate  Gastrointestinal:  Soft, Non-tender, + BS	  Skin: No rashes, No ecchymoses, No cyanosis	  Neurologic: Non-focal  Extremities: Normal range of motion, No clubbing, cyanosis or edema  Vascular: Peripheral pulses palpable 2+ bilaterally    TELEMETRY: 	    ECG:  	  RADIOLOGY:  OTHER: 	  	  LABS:	 	    CARDIAC MARKERS:  CARDIAC MARKERS ( 04 Jan 2019 12:51 )  x     / x     / 17 U/L / x     / x                                  12.7   12.7  )-----------( 273      ( 04 Jan 2019 12:50 )             37.8     01-04    139  |  97  |  18  ----------------------------<  184<H>  2.9<LL>   |  21<L>  |  0.83    Ca    8.4      04 Jan 2019 12:51  Phos  3.1     01-04  Mg     <.6     01-04    TPro  7.1  /  Alb  3.9  /  TBili  0.6  /  DBili  x   /  AST  12  /  ALT  18  /  AlkPhos  57  01-04    proBNP:   Lipid Profile:   HgA1c:   TSH:   PT/INR - ( 04 Jan 2019 12:50 )   PT: 14.8 sec;   INR: 1.28 ratio         PTT - ( 04 Jan 2019 12:50 )  PTT:25.0 sec    PREVIOUS DIAGNOSTIC TESTING:   < from: Transthoracic Echocardiogram (12.12.18 @ 15:46) >  1. Normal mitral valve. Minimal mitral regurgitation.  2. Normal trileaflet aortic valve.  3. Normal left ventricular internal dimensions and wall  thicknesses.  4. Normal left ventricular systolic function. No segmental  wall motion abnormalities.  5. Normal right ventricular size and function.  6. Estimated right ventricular systolic pressure equals 49  mm Hg, assuming right atrial pressure equals 8 mm Hg,  consistent with mild pulmonary hypertension.  7. Agitated saline injection demonstrates no evidence of a  patent foramen ovale.    < from: CT Head No Cont (01.04.19 @ 14:03) >  No CT evidence of acute territorial infarct, intracranial hemorrhage,   brain edema, or mass effect.    < from: 12 Lead ECG (01.04.19 @ 12:28) >  Diagnosis Line SINUS RHYTHM WITH PREMATURE ATRIAL COMPLEXES  MINIMAL VOLTAGE CRITERIA FOR LVH, MAY BE NORMAL VARIANT  NONSPECIFIC ST ABNORMALITY  ABNORMAL ECG    < end of copied text >

## 2019-01-04 NOTE — ED PROVIDER NOTE - OBJECTIVE STATEMENT
74 yo F with pmhx COPD(on home O2 and prednisone 5mg), htn, hld and dm presenting with dizziness x three days. patient states symptoms began all of a sudden with lightheadedness and dizziness "like the room is spinning" for three days. Patient states she has associated nv and blurred vision with her symptoms. Patient states recently started on prednisone for COPD. Patient unsure what brings on her symptoms but symptoms are intermittent. Patient admits to slight intermittent ha. Patient denies fever, cough, cp, sob, abd pain, tingling, numbness, weakness, syncope, aphasia, dysuria and hematuria

## 2019-01-04 NOTE — CONSULT NOTE ADULT - SUBJECTIVE AND OBJECTIVE BOX
Dr. Ashby  Office [phone]  Cell [phone]  Shara FERNANDEZ  Cell [phone]    HPI:  72 yo F with pmhx COPD (on home O2 and prednisone 5mg) , htn  , hld  and dm  2, presenting with dizziness x three days. She was found to have hypokalemia, hypomagnesemia, she was recently also admitted for hypokalemia. She occasionally has lose bowel motion and she had that few times last night, also has poor appetite. While talking to her she started shaking, developed tachycardia, had to be given xanax to calm her down.     Allergies:  No Known Allergies      PAST MEDICAL & SURGICAL HISTORY:  HLD (hyperlipidemia)  DM (diabetes mellitus)  HTN (hypertension)  No significant past surgical history      Home Medications Reviewed    Hospital Medications:   MEDICATIONS  (STANDING):  ALBUTerol  90 MICROgram(s) HFA Inhaler 2 Puff(s) Inhalation every 8 hours  ALPRAZolam 0.5 milliGRAM(s) Oral once  atorvastatin 40 milliGRAM(s) Oral at bedtime  cefTRIAXone   IVPB 1 Gram(s) IV Intermittent once  heparin  Injectable 5000 Unit(s) SubCutaneous every 12 hours  magnesium oxide 400 milliGRAM(s) Oral two times a day with meals  metoprolol succinate  milliGRAM(s) Oral daily  potassium chloride    Tablet ER 40 milliEquivalent(s) Oral once  potassium chloride  10 mEq/100 mL IVPB 10 milliEquivalent(s) IV Intermittent every 1 hour      SOCIAL HISTORY:  Denies ETOh, Smoking,     FAMILY HISTORY:  No pertinent family history in first degree relatives      REVIEW OF SYSTEMS:  CONSTITUTIONAL: No weakness, fevers or chills  EYES/ENT: No visual changes;  No vertigo or throat pain   NECK: No pain or stiffness  RESPIRATORY: No cough, wheezing, hemoptysis; No shortness of breath  CARDIOVASCULAR: No chest pain, has palpitations.  GASTROINTESTINAL: as per HPI  GENITOURINARY: No dysuria, frequency, foamy urine, urinary urgency, incontinence or hematuria  NEUROLOGICAL: No numbness or weakness  SKIN: No itching, burning, rashes, or lesions   VASCULAR: No bilateral lower extremity edema.   All other review of systems is negative unless indicated above.    VITALS:  T(F): 98.4 (19 @ 15:45), Max: 98.6 (19 @ 12:20)  HR: 128 (19 @ 18:48)  BP: 116/64 (19 @ 18:00)  RR: 18 (19 @ 18:48)  SpO2: 95% (19 @ 18:48)  Wt(kg): --    Height (cm): 157.48 ( @ 12:03)  Weight (kg): 65.3 ( @ 12:03)  BMI (kg/m2): 26.3 ( @ 12:03)  BSA (m2): 1.66 ( @ 12:03)    PHYSICAL EXAM:  Constitutional: NAD  HEENT: anicteric sclera, oropharynx clear, MMM  Neck: No JVD  Respiratory: CTAB, no wheezes, rales or rhonchi  Cardiovascular: S1, S2, RRR  Gastrointestinal: BS+, soft, NT/ND  Extremities: No cyanosis or clubbing. No peripheral edema  Neurological: A/O x 3, no focal deficits  Psychiatric: Normal mood, normal affect  : No CVA tenderness. No grullon.   Skin: No rashes     LABS:      139  |  97  |  18  ----------------------------<  184<H>  2.9<LL>   |  21<L>  |  0.83    Ca    8.4      2019 12:51  Phos  3.1       Mg     <.6         TPro  7.1  /  Alb  3.9  /  TBili  0.6  /  DBili      /  AST  12  /  ALT  18  /  AlkPhos  57      Creatinine Trend: 0.83 <--                        12.7   12.7  )-----------( 273      ( 2019 12:50 )             37.8     Urine Studies:  Urinalysis Basic - ( 2019 14:53 )    Color: Yellow / Appearance: Clear / S.030 / pH:   Gluc:  / Ketone: Moderate  / Bili: Negative / Urobili: 2 mg/dL   Blood:  / Protein: 100 mg/dL / Nitrite: Negative   Leuk Esterase: Large / RBC: 4 /hpf / WBC 15 /hpf   Sq Epi:  / Non Sq Epi: 6 /hpf / Bacteria: Few          RADIOLOGY & ADDITIONAL STUDIES:

## 2019-01-04 NOTE — H&P ADULT - NSHPPHYSICALEXAM_GEN_ALL_CORE
PHYSICAL EXAMINATION:  Vital Signs Last 24 Hrs  T(C): 37 (04 Jan 2019 12:20), Max: 37 (04 Jan 2019 12:20)  T(F): 98.6 (04 Jan 2019 12:20), Max: 98.6 (04 Jan 2019 12:20)  HR: 85 (04 Jan 2019 13:12) (74 - 85)  BP: 126/78 (04 Jan 2019 13:12) (126/78 - 154/84)  BP(mean): --  RR: 18 (04 Jan 2019 13:12) (18 - 18)  SpO2: 95% (04 Jan 2019 13:12) (93% - 95%)  CAPILLARY BLOOD GLUCOSE      POCT Blood Glucose.: 183 mg/dL (04 Jan 2019 12:21)        GENERAL: NAD, well-groomed,  HEAD:  atraumatic, normocephalic  EYES: sclera anicteric  ENMT: mucous membranes moist  NECK: supple, No JVD  CHEST/LUNG: clear to auscultation bilaterally;    no      rales   ,   no rhonchi,   HEART: normal S1, S2  ABDOMEN: BS+, soft, ND, NT   EXTREMITIES:    no    edema    b/l LEs  NEURO: awake, ,     moves all extremities  SKIN: no     rash

## 2019-01-04 NOTE — CONSULT NOTE ADULT - ASSESSMENT
74 yo F with pmhx COPD (on home O2 and prednisone 5mg) , htn  , hld  and dm  2, presenting with dizziness x three days. She was found to have hypokalemia, hypomagnesemia, she was recently also admitted for hypokalemia. She occasionally has lose bowel motion and she had that few times last night, also has poor appetite. While talking to her she started shaking, developed tachycardia, had to be given xanax to calm her down.

## 2019-01-04 NOTE — H&P ADULT - HISTORY OF PRESENT ILLNESS
74 yo F with pmhx   COPD(on home O2 and prednisone 5mg) , htn  , hld  and dm  2, presenting with dizziness x three days . patient states symptoms began all of a sudden with lightheadedness and dizziness "like the room is spinning" for three days . Patient states she has associated nv and blurred vision with her symptoms . Patient states recently started on prednisone for COPD. Patient unsure what brings on her symptoms but symptoms are intermittent.  . Patient denies fever, cough, cp, sob, abd pain, tingling, numbness, weakness, syncope, aphasia, dysuria and hematuria

## 2019-01-04 NOTE — ED PROVIDER NOTE - EYES, MLM
Clear bilaterally, pupils equal, round and reactive to light. EOM intact with slight horizontal nystagmus positive kati hallpike

## 2019-01-04 NOTE — ED PROVIDER NOTE - MEDICAL DECISION MAKING DETAILS
72 yo F with pmhx COPD(on home O2 and prednisone 5mg), htn, hld and dm presenting with dizziness x three days. will obtain labs, urine, ekg, cxr, and head ct. Will give fluids, reglan and antivert

## 2019-01-04 NOTE — CONSULT NOTE ADULT - PROBLEM SELECTOR RECOMMENDATION 9
Etiology?  possible GI loss/poor appetite  Associated with palpitation/anxiety attack-consider GI evaluation to rule out carcinoid syndrome.  Rule out adrenal adenoma/hyperplasia causing hyperaldosteronism/Pheochromocytoma  Get Renin/Aldosterone/Metanephrine level  Supplement KCL 120meq today Etiology?  possible GI loss/poor appetite  Associated with palpitation/anxiety attack-consider GI evaluation to rule out carcinoid syndrome.  Rule out adrenal adenoma/hyperplasia causing hyperaldosteronism/Pheochromocytoma  Get Renin/Aldosterone/Metanephrine level  Urine K, 24 hrs urine K and Cr  Supplement KCL 120meq today

## 2019-01-05 LAB
ANION GAP SERPL CALC-SCNC: 15 MMOL/L — SIGNIFICANT CHANGE UP (ref 5–17)
BUN SERPL-MCNC: 12 MG/DL — SIGNIFICANT CHANGE UP (ref 7–23)
CALCIUM SERPL-MCNC: 8.1 MG/DL — LOW (ref 8.4–10.5)
CHLORIDE SERPL-SCNC: 103 MMOL/L — SIGNIFICANT CHANGE UP (ref 96–108)
CO2 SERPL-SCNC: 22 MMOL/L — SIGNIFICANT CHANGE UP (ref 22–31)
CREAT SERPL-MCNC: 0.88 MG/DL — SIGNIFICANT CHANGE UP (ref 0.5–1.3)
CULTURE RESULTS: SIGNIFICANT CHANGE UP
GLUCOSE BLDC GLUCOMTR-MCNC: 127 MG/DL — HIGH (ref 70–99)
GLUCOSE BLDC GLUCOMTR-MCNC: 150 MG/DL — HIGH (ref 70–99)
GLUCOSE BLDC GLUCOMTR-MCNC: 152 MG/DL — HIGH (ref 70–99)
GLUCOSE BLDC GLUCOMTR-MCNC: 203 MG/DL — HIGH (ref 70–99)
GLUCOSE SERPL-MCNC: 108 MG/DL — HIGH (ref 70–99)
HCT VFR BLD CALC: 36.5 % — SIGNIFICANT CHANGE UP (ref 34.5–45)
HGB BLD-MCNC: 11.5 G/DL — SIGNIFICANT CHANGE UP (ref 11.5–15.5)
MAGNESIUM SERPL-MCNC: 1 MG/DL — CRITICAL LOW (ref 1.6–2.6)
MAGNESIUM UR-MCNC: 2.4 MG/DL — SIGNIFICANT CHANGE UP
MCHC RBC-ENTMCNC: 28.8 PG — SIGNIFICANT CHANGE UP (ref 27–34)
MCHC RBC-ENTMCNC: 31.4 GM/DL — LOW (ref 32–36)
MCV RBC AUTO: 91.7 FL — SIGNIFICANT CHANGE UP (ref 80–100)
PLATELET # BLD AUTO: 244 K/UL — SIGNIFICANT CHANGE UP (ref 150–400)
POTASSIUM SERPL-MCNC: 4 MMOL/L — SIGNIFICANT CHANGE UP (ref 3.5–5.3)
POTASSIUM SERPL-SCNC: 4 MMOL/L — SIGNIFICANT CHANGE UP (ref 3.5–5.3)
POTASSIUM UR-SCNC: 50 MMOL/L — SIGNIFICANT CHANGE UP
RBC # BLD: 3.98 M/UL — SIGNIFICANT CHANGE UP (ref 3.8–5.2)
RBC # FLD: 13.9 % — SIGNIFICANT CHANGE UP (ref 10.3–14.5)
SODIUM SERPL-SCNC: 140 MMOL/L — SIGNIFICANT CHANGE UP (ref 135–145)
SPECIMEN SOURCE: SIGNIFICANT CHANGE UP
WBC # BLD: 9 K/UL — SIGNIFICANT CHANGE UP (ref 3.8–10.5)
WBC # FLD AUTO: 9 K/UL — SIGNIFICANT CHANGE UP (ref 3.8–10.5)

## 2019-01-05 RX ORDER — POTASSIUM CHLORIDE 20 MEQ
20 PACKET (EA) ORAL
Qty: 0 | Refills: 0 | Status: DISCONTINUED | OUTPATIENT
Start: 2019-01-05 | End: 2019-01-06

## 2019-01-05 RX ORDER — ALPRAZOLAM 0.25 MG
0.5 TABLET ORAL AT BEDTIME
Qty: 0 | Refills: 0 | Status: DISCONTINUED | OUTPATIENT
Start: 2019-01-05 | End: 2019-01-08

## 2019-01-05 RX ORDER — MAGNESIUM OXIDE 400 MG ORAL TABLET 241.3 MG
400 TABLET ORAL
Qty: 0 | Refills: 0 | Status: DISCONTINUED | OUTPATIENT
Start: 2019-01-05 | End: 2019-01-08

## 2019-01-05 RX ORDER — BUDESONIDE AND FORMOTEROL FUMARATE DIHYDRATE 160; 4.5 UG/1; UG/1
2 AEROSOL RESPIRATORY (INHALATION)
Qty: 0 | Refills: 0 | Status: DISCONTINUED | OUTPATIENT
Start: 2019-01-05 | End: 2019-01-08

## 2019-01-05 RX ORDER — ACETAMINOPHEN 500 MG
650 TABLET ORAL ONCE
Qty: 0 | Refills: 0 | Status: COMPLETED | OUTPATIENT
Start: 2019-01-05 | End: 2019-01-05

## 2019-01-05 RX ORDER — MAGNESIUM SULFATE 500 MG/ML
2 VIAL (ML) INJECTION ONCE
Qty: 0 | Refills: 0 | Status: COMPLETED | OUTPATIENT
Start: 2019-01-05 | End: 2019-01-05

## 2019-01-05 RX ORDER — PANTOPRAZOLE SODIUM 20 MG/1
40 TABLET, DELAYED RELEASE ORAL
Qty: 0 | Refills: 0 | Status: DISCONTINUED | OUTPATIENT
Start: 2019-01-05 | End: 2019-01-07

## 2019-01-05 RX ADMIN — Medication 20 MILLIEQUIVALENT(S): at 17:11

## 2019-01-05 RX ADMIN — Medication 50 GRAM(S): at 09:43

## 2019-01-05 RX ADMIN — HEPARIN SODIUM 5000 UNIT(S): 5000 INJECTION INTRAVENOUS; SUBCUTANEOUS at 21:28

## 2019-01-05 RX ADMIN — ATORVASTATIN CALCIUM 40 MILLIGRAM(S): 80 TABLET, FILM COATED ORAL at 21:27

## 2019-01-05 RX ADMIN — Medication 1: at 13:09

## 2019-01-05 RX ADMIN — Medication 200 MILLIGRAM(S): at 10:11

## 2019-01-05 RX ADMIN — PANTOPRAZOLE SODIUM 40 MILLIGRAM(S): 20 TABLET, DELAYED RELEASE ORAL at 10:04

## 2019-01-05 RX ADMIN — Medication 1 TABLET(S): at 17:11

## 2019-01-05 RX ADMIN — HEPARIN SODIUM 5000 UNIT(S): 5000 INJECTION INTRAVENOUS; SUBCUTANEOUS at 09:19

## 2019-01-05 RX ADMIN — Medication 2: at 09:14

## 2019-01-05 RX ADMIN — ALBUTEROL 2 PUFF(S): 90 AEROSOL, METERED ORAL at 06:09

## 2019-01-05 RX ADMIN — Medication 0.5 MILLIGRAM(S): at 21:05

## 2019-01-05 RX ADMIN — Medication 650 MILLIGRAM(S): at 00:37

## 2019-01-05 RX ADMIN — Medication 650 MILLIGRAM(S): at 06:07

## 2019-01-05 RX ADMIN — MAGNESIUM OXIDE 400 MG ORAL TABLET 400 MILLIGRAM(S): 241.3 TABLET ORAL at 09:16

## 2019-01-05 RX ADMIN — MAGNESIUM OXIDE 400 MG ORAL TABLET 400 MILLIGRAM(S): 241.3 TABLET ORAL at 17:11

## 2019-01-05 RX ADMIN — Medication 200 MILLIGRAM(S): at 06:07

## 2019-01-05 RX ADMIN — BUDESONIDE AND FORMOTEROL FUMARATE DIHYDRATE 2 PUFF(S): 160; 4.5 AEROSOL RESPIRATORY (INHALATION) at 17:11

## 2019-01-05 RX ADMIN — Medication 650 MILLIGRAM(S): at 07:45

## 2019-01-05 RX ADMIN — MAGNESIUM OXIDE 400 MG ORAL TABLET 400 MILLIGRAM(S): 241.3 TABLET ORAL at 12:38

## 2019-01-05 NOTE — CONSULT NOTE ADULT - SUBJECTIVE AND OBJECTIVE BOX
[facility] - [facility]  CONSULT NOTE    HPI: 73 year old gentlewoman, former smoker, with history of HTN, HLD, DM without known CAD. A recent ECHO revealed a preserved LVEF, no significant valvular heart disease and moderate pulmonary hypertension. The patient was recently admitted with multiple electrolyte abnormalities including hypokalemia, hypocalcemia, hypophosphatemia and hypomagnesemia which were corrected. She was noted to be hypoxic on room air due to a combination of obstructive and restrictive lung disease and was discharged on ATC home oxygen, inhaled and systemic steroids, bronchodilators and antibiotics. Her steroids have been tapered in the outpatient setting by Dr. Larissa Fields and she is currently on 5mg daily. The patient called our office yesterday complaining of several days of dizziness and lightheadedness with the room "spinning" associated with nausea and blurred vision. Glucose measured at home was only minimally elevated and the patient was advised to come to the ER for further evaluation. Here she was found to have profound hypomagnesemia and required xanax for anxiety with tachycardia. No cough, sputum production, chest congestion or wheeze. No fevers, chills or sweats. No chest pain/pressure or palpitations.     PMHX:  Chronic hypoxic respiratory failure  COPD/emphysema  Hiatal hernia  HLD (hyperlipidemia)  DM (diabetes mellitus)  HTN (hypertension)      PSHX:  Bilateral hip replacement  Left knee replacement    FAMILY HISTORY:  No pertinent family history in first degree relatives      SOCIAL HISTORY:  ; lives with ; retired from [employer]; former smoker 1ppd x 40    Pulmonary Medications:   buDESOnide 160 MICROgram(s)/formoterol 4.5 MICROgram(s) Inhaler 2 Puff(s) Inhalation two times a day      Antimicrobials:      Cardiology:  metoprolol succinate  milliGRAM(s) Oral daily      Other:  atorvastatin 40 milliGRAM(s) Oral at bedtime  dextrose 40% Gel 15 Gram(s) Oral once PRN  dextrose 5%. 1000 milliLiter(s) IV Continuous <Continuous>  dextrose 50% Injectable 12.5 Gram(s) IV Push once  dextrose 50% Injectable 25 Gram(s) IV Push once  dextrose 50% Injectable 25 Gram(s) IV Push once  glucagon  Injectable 1 milliGRAM(s) IntraMuscular once PRN  heparin  Injectable 5000 Unit(s) SubCutaneous every 12 hours  insulin lispro (HumaLOG) corrective regimen sliding scale   SubCutaneous three times a day before meals  insulin lispro (HumaLOG) corrective regimen sliding scale   SubCutaneous at bedtime  magnesium oxide 400 milliGRAM(s) Oral three times a day with meals  pantoprazole    Tablet 40 milliGRAM(s) Oral before breakfast  potassium chloride    Tablet ER 20 milliEquivalent(s) Oral two times a day      Allergies    No Known Allergies    HOME MEDICATIONS: see  H & P    REVIEW OF SYSTEMS:  Constitutional: As per HPI  HEENT: Within normal limits  CV: As per HPI  Resp: As per HPI  GI: Within normal limits   : Within normal limits  Musculoskeletal: Within normal limits  Skin: Within normal limits  Neurological: Within normal limits  Psychiatric: Within normal limits  Endocrine: Within normal limits  Hematologic/Lymphatic: Within normal limits  Allergic/Immunologic: Within normal limits    [ ] All other systems negative    OBJECTIVE:    I&O's Detail    2019 07:  -  2019 07:00  --------------------------------------------------------  IN:    Oral Fluid: 120 mL  Total IN: 120 mL    OUT:    Voided: 100 mL  Total OUT: 100 mL    Total NET: 20 mL      2019 07:01  -  2019 10:39  --------------------------------------------------------  IN:    Oral Fluid: 240 mL  Total IN: 240 mL    OUT:  Total OUT: 0 mL    Total NET: 240 mL    Daily Height in cm: 157.48 (2019 12:03)    Daily Weight in k.2 (2019 06:46)    POCT Blood Glucose.: 203 mg/dL (2019 09:06)  POCT Blood Glucose.: 219 mg/dL (2019 20:26)  POCT Blood Glucose.: 183 mg/dL (2019 12:21)      PHYSICAL EXAM:  ICU Vital Signs Last 24 Hrs  T(C): 36.3 (2019 04:26), Max: 37.4 (2019 20:07)  T(F): 97.4 (2019 04:26), Max: 99.3 (2019 20:07)  HR: 91 (2019 04:26) (74 - 170)  BP: 136/81 (2019 04:26) (116/64 - 178/78)  BP(mean): --  ABP: --  ABP(mean): --  RR: 19 (2019 04:26) (18 - 24)  SpO2: 90% (2019 04:26) (90% - 95%)    General: Awake. Alert. Cooperative. No distress. Appears stated age 	  HEENT:   Atraumatic. Normocephalic. Anicteric. Normal oral mucosa. PERRL. EOMI.  Neck: Supple. Trachea midline. Thyroid without enlargement/tenderness/nodules. No carotid bruit. No JVD.	  Cardiovascular: Regular rate and rhythm. S1 S2 normal. No murmurs, rubs or gallops.  Respiratory: Respirations unlabored. Clear to auscultation and percussion bilaterally. No curvature.  Abdomen: Soft. Non-tender. Non-distended. No organomegaly. No masses. Normal bowel sounds.  Extremities: Warm to touch. No clubbing or cyanosis. No pedal edema.  Pulses: 2+ peripheral pulses all extremities.	  Skin: Normal skin color. No rashes or lesions. No ecchymoses. No cyanosis. Warm to touch.  Lymph Nodes: Cervical, supraclavicular and axillary nodes normal  Neurological: Motor and sensory examination equal and normal. A and O x 3  Psychiatry: Appropriate mood and affect.      LABS:                          11.5   9.0   )-----------( 244      ( 2019 07:09 )             36.5                         12.7   12.7  )-----------( 273      ( 2019 12:50 )             37.8         140  |  103  |  12  ----------------------------<  108<H>  4.0   |  22  |  0.88    -04    138  |  99  |  14  ----------------------------<  233<H>  3.7   |  18<L>  |  0.82    Ca      8.1<L>          Ca      8.0<L>          Phos    3.1           Mg       1.0         Mg       <.6         TPro  7.1  /  Alb  3.9  /  TBili  0.6  /  DBili  x   /  AST  12  /  ALT  18  /  AlkPhos  57      PT/INR - ( 2019 12:50 )   PT: 14.8 sec;   INR: 1.28 ratio         PTT - ( 2019 12:50 )  PTT:25.0 sec    Venous Blood Gas:   @ 12:50  7.49/32/43/24/76  VBG Lactate: 1.8    CARDIAC MARKERS ( 2019 12:51 )  x     / x     / 17 U/L / x     / x        < from: Transthoracic Echocardiogram (18 @ 15:46) >    Patient name: GOLDBERG, EDITH  YOB: 1945   Age: 73 (F)   MR#: [id number]  Study Date: 2018  Location: [address] [place], [place] [id number]: Elvi Condon [facility]  Study quality: Technically good  Referring Physician: Luis Alberto Osborne MD  Blood Pressure: 99/60 mmHg  Height: 163 cm  Weight: 73 kg  BSA: 1.8 m2  ------------------------------------------------------------------------  PROCEDURE: Transthoracic echocardiogram with 2-D, M-Mode  and complete spectral and color flow Doppler. Patient was  injected with 10 cc's of aerosolized saline. Patient was  injected with 10 cc's of aerosolized saline.  INDICATION: Dyspnea, unspecified (R06.00)  ------------------------------------------------------------------------  Dimensions:    Normal Values:  LA:     2.4    2.0 - 4.0 cm  Ao:     2.7    2.0 - 3.8 cm  SEPTUM: 0.7    0.6 - 1.2 cm  PWT:    0.7    0.6 - 1.1 cm  LVIDd:  3.9    3.0 - 5.6 cm  LVIDs:  2.5    1.8 - 4.0 cm  Derived variables:  LVMI: 42 g/m2  RWT: 0.35  Fractional short: 36 %  EF (Teicholtz): 66 %  Doppler Peak Velocity (m/sec): AoV=1.8 PV=0.8  ------------------------------------------------------------------------  Observations:  Mitral Valve: Normal mitral valve. Minimal mitral  regurgitation.  Aortic Valve/Aorta: Normal trileaflet aortic valve. Peak  transaortic valve gradient equals 13 mm Hg, consistent with  mild aortic stenosis. Peak left ventricular outflow tract  gradient equals 6 mm Hg, mean gradient is equal to 4 mm Hg,  LVOT velocity time integral equals 29 cm.  Aortic Root: 2.7 cm.  LVOT diameter: 2 cm.  Left Atrium: Normal left atrium.  LA volume index = 17  cc/m2.  Left Ventricle: Normal left ventricular systolic function.  No segmental wall motion abnormalities. Normal left  ventricular internal dimensions and wall thicknesses.  Right Heart: Normal right atrium. Normal right ventricular  size and function. Normal tricuspid valve. Normal pulmonic  valve.  Pericardium/Pleura: Normal pericardium with no pericardial  effusion.  Hemodynamic: Estimated right atrial pressure is 8 mm Hg.  Estimated right ventricular systolic pressure equals 49 mm  Hg, assuming right atrial pressure equals 8 mm Hg,  consistent with mild pulmonary hypertension. Agitated  saline injection demonstrates no evidence of a patent  foramen ovale.  ------------------------------------------------------------------------  Conclusions:  1. Normal mitral valve. Minimal mitral regurgitation.  2. Normal trileaflet aortic valve.  3. Normal left ventricular internal dimensions and wall  thicknesses.  4. Normal left ventricular systolic function. No segmental  wall motion abnormalities.  5. Normal right ventricular size and function.  6. Estimated right ventricular systolic pressure equals 49  mm Hg, assuming right atrial pressure equals 8 mm Hg,  consistent with mild pulmonary hypertension.  7. Agitated saline injection demonstrates no evidence of a  patent foramen ovale.  *** No previous Echo exam.  ------------------------------------------------------------------------  Confirmed on  2018 - 18:22:37 by Vahe Dodge MD  ------------------------------------------------------------------------    < end of copied text >  ---------------------------------------------------------------------------------------------------------------      MICROBIOLOGY:   Urinalysis Basic - ( 2019 14:53 )    Color: Yellow / Appearance: Clear / S.030 / pH: x  Gluc: x / Ketone: Moderate  / Bili: Negative / Urobili: 2 mg/dL   Blood: x / Protein: 100 mg/dL / Nitrite: Negative   Leuk Esterase: Large / RBC: 4 /hpf / WBC 15 /hpf   Sq Epi: x / Non Sq Epi: 6 /hpf / Bacteria: Few        RADIOLOGY:  [x ] Chest radiographs reviewed and interpreted by me    < from: CT Head No Cont (19 @ 14:03) >    EXAM:  CT BRAIN                          PROCEDURE DATE:  2019      INTERPRETATION:  CLINICAL INFORMATION: Dizziness. Nausea.    TECHNIQUE: Noncontrast axial CT images were acquired through the head.   Two-dimensional sagittal and coronal reformats were generated.    COMPARISON STUDY: None    FINDINGS:   There is no CT evidence of acute territorial infarct, acute intracranial   hemorrhage, extra-axial collection, mass effect, midline shift or   hydrocephalus.     Ventricles and sulci are age-appropriate.   Moderate  white matter   microvascular ischemic-type changes with patchy hypodensities.    The visualized paranasal sinuses are clear. The mastoid air cells and   middle ear cavities are clear. Right lens removal status postcataract   surgery.    The soft tissues of the scalp are unremarkable.  The calvarium is intact.    IMPRESSION:     No CT evidence of acute territorial infarct, intracranial hemorrhage,   brain edema, or mass effect.    JUAN MANUEL HOLCOMB M.D., RADIOLOGY RESIDENT  This document has been electronically signed.  MABEL VEGA M.D., ATTENDING RADIOLOGIST  This document has been electronically signed. 2019  2:21PM      < end of copied text >  ---------------------------------------------------------------------------------------------------------------  < from: Xray Chest 2 Views PA/Lat (12.11.18 @ 12:23) >    EXAM:  XR CHEST PA LAT 2V                          PROCEDURE DATE:  2018      INTERPRETATION:  PA and lateral projection of the chest was obtained on   2018.    Indication: Cough.    Impression:    The heart is normal in size. The lungs are clear. A big hiatal hernia is   seen on the left. No change in the appearance of the chest when compared   to previous study done 2018.    WILBUR GOMEZ M.D., ATTENDING RADIOLOGIST  This document has been electronically signed. Dec 11 2018  1:12PM    < end of copied text >  ---------------------------------------------------------------------------------------------------------------    < from: CT Angio Chest w/ IV Cont (12..18 @ 12:58) >    EXAM:  CT ANGIO CHEST (W)AW IC                          PROCEDURE DATE:  2018      INTERPRETATION:  Clinical information: Hypoxia. Evaluate for pulmonary   embolus.    CT angiogram of the chest was obtained following administration of  intravenous contrast. Approximately 90 cc of Omnipaque 350 was   administered and 10 cc was discarded. Coronal, sagittal and MIP images   were submitted for review.    Few lymph nodes are present in the pretracheal space and the AP window.    Heartis normal in size. Calcification the coronary arteries is noted. No   pericardial effusion is noted. Pulmonary arteries are normal in caliber.   No filling defects are noted.    Very large hiatal hernia is noted.    No endobronchial lesions are noted. 0.4 cm solid nodule is noted in the   right middle lobe. Centrilobular emphysema is noted bilaterally. No   pleural effusions are noted.    Below the diaphragm, visualized portions of the abdomen are unremarkable.     Degenerative changes of the spine are noted.    Impression: No pulmonary embolus is noted.    Very large hiatal hernia.    KARINE MCDOWELL M.D., ATTENDING RADIOLOGIST  This document has been electronically signed. 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 A recent ECHO revealed a preserved LVEF, no significant valvular heart disease and moderate pulmonary hypertension. The patient was recently admitted with multiple electrolyte abnormalities including hypokalemia, hypocalcemia, hypophosphatemia and hypomagnesemia which were corrected - no clear etiology was determined - diuretics were discontinued. She was noted to be hypoxic on room air due to a combination of obstructive and restrictive lung disease and was discharged on ATC home oxygen, inhaled and systemic steroids, bronchodilators and antibiotics. Her steroids have been tapered in the outpatient setting by Dr. Larissa Fields and she is currently on 5mg daily. The patient called our office yesterday complaining of several days of dizziness and lightheadedness with the room "spinning" associated with anorexia, blurred vision, sweats. tremor and an unsteady gait. Glucose measured at home was only minimally elevated and the patient was advised to come to the ER for further evaluation. Here she was found to have profound hypomagnesemia and hypokalemia. She required xanax for anxiety with tachycardia. No cough, sputum production, chest congestion or wheeze. No fevers, chills or sweats. No chest pain/pressure or palpitations. She has no shortness of breath even when walking without oxygen. Asked to evaluate.    PMHX:  Chronic hypoxic respiratory failure  COPD/emphysema  Hiatal hernia  HLD (hyperlipidemia)  DM (diabetes mellitus)  HTN (hypertension)      PSHX:  Bilateral hip replacement  Left knee replacement    FAMILY HISTORY:  No pertinent family history in first degree relatives      SOCIAL HISTORY:  ; lives with ; retired from [employer]; former smoker 1ppd x 40    Pulmonary Medications:   buDESOnide 160 MICROgram(s)/formoterol 4.5 MICROgram(s) Inhaler 2 Puff(s) Inhalation two times a day      Antimicrobials:      Cardiology:  metoprolol succinate  milliGRAM(s) Oral daily      Other:  atorvastatin 40 milliGRAM(s) Oral at bedtime  dextrose 40% Gel 15 Gram(s) Oral once PRN  dextrose 5%. 1000 milliLiter(s) IV Continuous <Continuous>  dextrose 50% Injectable 12.5 Gram(s) IV Push once  dextrose 50% Injectable 25 Gram(s) IV Push once  dextrose 50% Injectable 25 Gram(s) IV Push once  glucagon  Injectable 1 milliGRAM(s) IntraMuscular once PRN  heparin  Injectable 5000 Unit(s) SubCutaneous every 12 hours  insulin lispro (HumaLOG) corrective regimen sliding scale   SubCutaneous three times a day before meals  insulin lispro (HumaLOG) corrective regimen sliding scale   SubCutaneous at bedtime  magnesium oxide 400 milliGRAM(s) Oral three times a day with meals  pantoprazole    Tablet 40 milliGRAM(s) Oral before breakfast  potassium chloride    Tablet ER 20 milliEquivalent(s) Oral two times a day      Allergies    No Known Allergies    HOME MEDICATIONS: see  H & P    REVIEW OF SYSTEMS:  Constitutional: As per HPI  HEENT: Within normal limits  CV: As per HPI  Resp: As per HPI  GI: Within normal limits   : Within normal limits  Musculoskeletal: As per HPI  Skin: Within normal limits  Neurological: Within normal limits  Psychiatric: Within normal limits  Endocrine: As per HPI  Hematologic/Lymphatic: Within normal limits  Allergic/Immunologic: Within normal limits    [x] All other systems negative    OBJECTIVE:    I&O's Detail    2019 07:  -  2019 07:00  --------------------------------------------------------  IN:    Oral Fluid: 120 mL  Total IN: 120 mL    OUT:    Voided: 100 mL  Total OUT: 100 mL    Total NET: 20 mL      2019 07:01  -  2019 10:39  --------------------------------------------------------  IN:    Oral Fluid: 240 mL  Total IN: 240 mL    OUT:  Total OUT: 0 mL    Total NET: 240 mL    Daily Height in cm: 157.48 (2019 12:03)    Daily Weight in k.2 (2019 06:46)    POCT Blood Glucose.: 203 mg/dL (2019 09:06)  POCT Blood Glucose.: 219 mg/dL (2019 20:26)  POCT Blood Glucose.: 183 mg/dL (2019 12:21)      PHYSICAL EXAM:  ICU Vital Signs Last 24 Hrs  T(C): 36.3 (2019 04:26), Max: 37.4 (2019 20:07)  T(F): 97.4 (2019 04:26), Max: 99.3 (2019 20:07)  HR: 91 (2019 04:26) (74 - 170)  BP: 136/81 (2019 04:26) (116/64 - 178/78)  BP(mean): --  ABP: --  ABP(mean): --  RR: 19 (2019 04:26) (18 - 24)  SpO2: 90% (2019 04:26) (90% - 95%) on 2lpm nasal canula    General: Awake. Alert. Cooperative. No distress. Appears stated age 	  HEENT:   Atraumatic. Normocephalic. Anicteric. Normal oral mucosa. PERRL. EOMI.  Neck: Supple. Trachea midline. Thyroid without enlargement/tenderness/nodules. No carotid bruit. No JVD.	  Cardiovascular: Regular rate and rhythm. S1 S2 normal. No murmurs, rubs or gallops.  Respiratory: Respirations unlabored. Bibasilar rales. No curvature.  Abdomen: Soft. Non-tender. Non-distended. No organomegaly. No masses. Normal bowel sounds.  Extremities: Warm to touch. No clubbing or cyanosis. No pedal edema.  Pulses: 2+ peripheral pulses all extremities.	  Skin: Normal skin color. No rashes or lesions. No ecchymoses. No cyanosis. Warm to touch.  Lymph Nodes: Cervical, supraclavicular and axillary nodes normal  Neurological: Motor and sensory examination equal and normal. A and O x 3  Psychiatry: Appropriate mood and affect.      LABS:                          11.5   9.0   )-----------( 244      ( 2019 07:09 )             36.5                         12.7   12.7  )-----------( 273      ( 2019 12:50 )             37.8     -05    140  |  103  |  12  ----------------------------<  108<H>  4.0   |  22  |  0.88        138  |  99  |  14  ----------------------------<  233<H>  3.7   |  18<L>  |  0.82    Ca      8.1<L>          Ca      8.0<L>          Phos    3.1           Mg       1.0         Mg       <.6         TPro  7.1  /  Alb  3.9  /  TBili  0.6  /  DBili  x   /  AST  12  /  ALT  18  /  AlkPhos  57      PT/INR - ( 2019 12:50 )   PT: 14.8 sec;   INR: 1.28 ratio       PTT - ( 2019 12:50 )  PTT:25.0 sec    Venous Blood Gas:   @ 12:50  7.49/32/43/24/76  VBG Lactate: 1.8    CARDIAC MARKERS ( 2019 12:51 )  x     / x     / 17 U/L / x     / x        < from: Transthoracic Echocardiogram (18 @ 15:46) >    Patient name: GOLDBERG, EDITH  YOB: 1945   Age: 73 (F)   MR#: [id number]  Study Date: 2018  Location: [address] [place], [place] [id number]: Elvi Condon TIANA  Study quality: Technically good  Referring Physician: Luis Alberto Osborne MD  Blood Pressure: 99/60 mmHg  Height: 163 cm  Weight: 73 kg  BSA: 1.8 m2  ------------------------------------------------------------------------  PROCEDURE: Transthoracic echocardiogram with 2-D, M-Mode  and complete spectral and color flow Doppler. Patient was  injected with 10 cc's of aerosolized saline. Patient was  injected with 10 cc's of aerosolized saline.  INDICATION: Dyspnea, unspecified (R06.00)  ------------------------------------------------------------------------  Dimensions:    Normal Values:  LA:     2.4    2.0 - 4.0 cm  Ao:     2.7    2.0 - 3.8 cm  SEPTUM: 0.7    0.6 - 1.2 cm  PWT:    0.7    0.6 - 1.1 cm  LVIDd:  3.9    3.0 - 5.6 cm  LVIDs:  2.5    1.8 - 4.0 cm  Derived variables:  LVMI: 42 g/m2  RWT: 0.35  Fractional short: 36 %  EF (Teicholtz): 66 %  Doppler Peak Velocity (m/sec): AoV=1.8 PV=0.8  ------------------------------------------------------------------------  Observations:  Mitral Valve: Normal mitral valve. Minimal mitral  regurgitation.  Aortic Valve/Aorta: Normal trileaflet aortic valve. Peak  transaortic valve gradient equals 13 mm Hg, consistent with  mild aortic stenosis. Peak left ventricular outflow tract  gradient equals 6 mm Hg, mean gradient is equal to 4 mm Hg,  LVOT velocity time integral equals 29 cm.  Aortic Root: 2.7 cm.  LVOT diameter: 2 cm.  Left Atrium: Normal left atrium.  LA volume index = 17  cc/m2.  Left Ventricle: Normal left ventricular systolic function.  No segmental wall motion abnormalities. Normal left  ventricular internal dimensions and wall thicknesses.  Right Heart: Normal right atrium. Normal right ventricular  size and function. Normal tricuspid valve. Normal pulmonic  valve.  Pericardium/Pleura: Normal pericardium with no pericardial  effusion.  Hemodynamic: Estimated right atrial pressure is 8 mm Hg.  Estimated right ventricular systolic pressure equals 49 mm  Hg, assuming right atrial pressure equals 8 mm Hg,  consistent with mild pulmonary hypertension. Agitated  saline injection demonstrates no evidence of a patent  foramen ovale.  ------------------------------------------------------------------------  Conclusions:  1. Normal mitral valve. Minimal mitral regurgitation.  2. Normal trileaflet aortic valve.  3. Normal left ventricular internal dimensions and wall  thicknesses.  4. Normal left ventricular systolic function. No segmental  wall motion abnormalities.  5. Normal right ventricular size and function.  6. Estimated right ventricular systolic pressure equals 49  mm Hg, assuming right atrial pressure equals 8 mm Hg,  consistent with mild pulmonary hypertension.  7. Agitated saline injection demonstrates no evidence of a  patent foramen ovale.  *** No previous Echo exam.  ------------------------------------------------------------------------  Confirmed on  2018 - 18:22:37 by Vahe Dodge MD  ------------------------------------------------------------------------    < end of copied text >  ---------------------------------------------------------------------------------------------------------------      MICROBIOLOGY:   Urinalysis Basic - ( 2019 14:53 )    Color: Yellow / Appearance: Clear / S.030 / pH: x  Gluc: x / Ketone: Moderate  / Bili: Negative / Urobili: 2 mg/dL   Blood: x / Protein: 100 mg/dL / Nitrite: Negative   Leuk Esterase: Large / RBC: 4 /hpf / WBC 15 /hpf   Sq Epi: x / Non Sq Epi: 6 /hpf / Bacteria: Few        RADIOLOGY:  [x ] Chest radiographs reviewed and interpreted by me    < from: CT Head No Cont (19 @ 14:03) >    EXAM:  CT BRAIN                          PROCEDURE DATE:  2019      INTERPRETATION:  CLINICAL INFORMATION: Dizziness. Nausea.    TECHNIQUE: Noncontrast axial CT images were acquired through the head.   Two-dimensional sagittal and coronal reformats were generated.    COMPARISON STUDY: None    FINDINGS:   There is no CT evidence of acute territorial infarct, acute intracranial   hemorrhage, extra-axial collection, mass effect, midline shift or   hydrocephalus.     Ventricles and sulci are age-appropriate.   Moderate  white matter   microvascular ischemic-type changes with patchy hypodensities.    The visualized paranasal sinuses are clear. The mastoid air cells and   middle ear cavities are clear. Right lens removal status postcataract   surgery.    The soft tissues of the scalp are unremarkable.  The calvarium is intact.    IMPRESSION:     No CT evidence of acute territorial infarct, intracranial hemorrhage,   brain edema, or mass effect.    JUAN MANUEL HOLCOMB M.D., RADIOLOGY RESIDENT  This document has been electronically signed.  MABEL VEGA M.D., ATTENDING RADIOLOGIST  This document has been electronically signed. 2019  2:21PM      < end of copied text >  ---------------------------------------------------------------------------------------------------------------  < from: Xray Chest 2 Views PA/Lat (18 @ 12:23) >    EXAM:  XR CHEST PA LAT 2V                          PROCEDURE DATE:  2018      INTERPRETATION:  PA and lateral projection of the chest was obtained on   2018.    Indication: Cough.    Impression:    The heart is normal in size. The lungs are clear. A big hiatal hernia is   seen on the left. No change in the appearance of the chest when compared   to previous study done 2018.    WILBUR GOMEZ M.D., ATTENDING RADIOLOGIST  This document has been electronically signed. Dec 11 2018  1:12PM    < end of copied text >  ---------------------------------------------------------------------------------------------------------------    < from: CT Angio Chest w/ IV Cont (18 @ 12:58) >    EXAM:  CT ANGIO CHEST (W)AW IC                          PROCEDURE DATE:  2018      INTERPRETATION:  Clinical information: Hypoxia. Evaluate for pulmonary   embolus.    CT angiogram of the chest was obtained following administration of  intravenous contrast. Approximately 90 cc of Omnipaque 350 was   administered and 10 cc was discarded. Coronal, sagittal and MIP images   were submitted for review.    Few lymph nodes are present in the pretracheal space and the AP window.    Heart is normal in size. Calcification the coronary arteries is noted. No   pericardial effusion is noted. Pulmonary arteries are normal in caliber.   No filling defects are noted.    Very large hiatal hernia is noted.    No endobronchial lesions are noted. 0.4 cm solid nodule is noted in the   right middle lobe. Centrilobular emphysema is noted bilaterally. No   pleural effusions are noted.    Below the diaphragm, visualized portions of the abdomen are unremarkable.     Degenerative changes of the spine are noted.    Impression: No pulmonary embolus is noted.    Very large hiatal hernia.    KARINE MCDOWELL M.D., ATTENDING RADIOLOGIST  This document has been electronically signed. Dec 11 2018  1:12PM     < end of copied text >  ---------------------------------------------------------------------------------------------------------------  SPIROMETRY (2018)    FEV1 1.19 liters - 53% predicted  FVC 2.06 liters - 69% predicted  FEV1% 58    c/w moderate obstructive lung disease

## 2019-01-05 NOTE — PROGRESS NOTE ADULT - ASSESSMENT
74 yo Female PMH COPD(on home O2 and prednisone 5mg), HTN, HLD, DM(II), presenting with dizziness x three days. Patient states symptoms began all of a sudden with lightheadedness and dizziness "like the room is spinning" for three days . Patient states she has associated nv and blurred vision with her symptoms . Patient states recently started on prednisone for COPD. Patient unsure what brings on her symptoms but symptoms are intermittent.     CV: Tele is NSR. Will stop tele. Continue Toprol  mg/day. BP at goal 136/80.     COPD: Change to Symbicort BID while in hospital.     Renal: Creatinine normal .88. K better 4.0, Magnesium better 1.0. Will give 2 grams IVSS today,   increase Mag-oxide 400 mg tid and KCL bid. Follow up 24 hour urine collection as ordered   by renal. Recent prednisone use would account for low K levels. 74 yo Female PMH COPD(on home O2 and prednisone 5mg), HTN, HLD, DM(II), presenting with dizziness x three days. Patient states symptoms began all of a sudden with lightheadedness and dizziness "like the room is spinning" for three days . Patient states she has associated nv and blurred vision with her symptoms . Patient states recently started on prednisone for COPD. Patient unsure what brings on her symptoms but symptoms are intermittent.     CV: Tele is NSR. Will stop tele. Continue Toprol  mg/day. BP at goal 136/80.     COPD: Change to Symbicort BID while in hospital.     Renal: Creatinine normal .88. K better 4.0, Magnesium better 1.0. Will give 2 grams IVSS today,   increase Mag-oxide 400 mg tid and KCL bid. Follow up 24 hour urine collection as ordered   by renal. Recent prednisone use would account for low K levels.  Calcium 8.1, add Oscal bid.

## 2019-01-05 NOTE — PROGRESS NOTE ADULT - SUBJECTIVE AND OBJECTIVE BOX
[facility] NEPHROLOGY PRACTICE   MD VANDA KANG MD ANGELA WONG, PA    TEL:  OFFICE: [phone]  DR CISNEROS CELL: [phone]  DR. CHIN CELL: [phone]  BENJAMÍN CEDEÑO CELL: [phone]        Patient is a 73y old  Female who presents with a chief complaint of symptomatic electrolyte abnormalities - hypokalemia - hypomagnesemia (05 Jan 2019 10:37)      Patient seen and examined at bedside. No chest pain/sob    VITALS:  T(F): 98.2 (01-05-19 @ 12:06), Max: 99.3 (01-04-19 @ 20:07)  HR: 84 (01-05-19 @ 12:06)  BP: 115/74 (01-05-19 @ 12:06)  RR: 18 (01-05-19 @ 12:06)  SpO2: 90% (01-05-19 @ 12:06)  Wt(kg): --    01-04 @ 07:01  -  01-05 @ 07:00  --------------------------------------------------------  IN: 120 mL / OUT: 100 mL / NET: 20 mL    01-05 @ 07:01  -  01-05 @ 16:22  --------------------------------------------------------  IN: 480 mL / OUT: 0 mL / NET: 480 mL          PHYSICAL EXAM:  Constitutional: NAD  Neck: No JVD  Respiratory: CTAB, no wheezes, rales or rhonchi  Cardiovascular: S1, S2, RRR  Gastrointestinal: BS+, soft, NT/ND  Extremities: No peripheral edema    Hospital Medications:   MEDICATIONS  (STANDING):  atorvastatin 40 milliGRAM(s) Oral at bedtime  buDESOnide 160 MICROgram(s)/formoterol 4.5 MICROgram(s) Inhaler 2 Puff(s) Inhalation two times a day  calcium carbonate 1250 mG  + Vitamin D (OsCal 500 + D) 1 Tablet(s) Oral two times a day  dextrose 5%. 1000 milliLiter(s) (50 mL/Hr) IV Continuous <Continuous>  dextrose 50% Injectable 12.5 Gram(s) IV Push once  dextrose 50% Injectable 25 Gram(s) IV Push once  dextrose 50% Injectable 25 Gram(s) IV Push once  heparin  Injectable 5000 Unit(s) SubCutaneous every 12 hours  insulin lispro (HumaLOG) corrective regimen sliding scale   SubCutaneous three times a day before meals  insulin lispro (HumaLOG) corrective regimen sliding scale   SubCutaneous at bedtime  magnesium oxide 400 milliGRAM(s) Oral three times a day with meals  metoprolol succinate  milliGRAM(s) Oral daily  pantoprazole    Tablet 40 milliGRAM(s) Oral before breakfast  potassium chloride    Tablet ER 20 milliEquivalent(s) Oral two times a day      LABS:  01-05    140  |  103  |  12  ----------------------------<  108<H>  4.0   |  22  |  0.88    Ca    8.1<L>      05 Jan 2019 07:09  Phos  3.1     01-04  Mg     1.0     01-05    TPro  7.1  /  Alb  3.9  /  TBili  0.6  /  DBili      /  AST  12  /  ALT  18  /  AlkPhos  57  01-04    Creatinine Trend: 0.88 <--, 0.82 <--, 0.83 <--                                11.5   9.0   )-----------( 244      ( 05 Jan 2019 07:09 )             36.5     Urine Studies:  Urinalysis - [01-04-19 @ 14:53]      Color Yellow / Appearance Clear / SG 1.030 / pH 6.0      Gluc Negative / Ketone Moderate  / Bili Negative / Urobili 2 mg/dL       Blood Trace / Protein 100 mg/dL / Leuk Est Large / Nitrite Negative      RBC 4 / WBC 15 / Hyaline 1 / Gran  / Sq Epi  / Non Sq Epi 6 / Bacteria Few    Urine Potassium 50      [01-05-19 @ 06:30]    PTH -- (Ca 6.4)      [12-11-18 @ 15:10]   26  HbA1c 7.7      [12-13-18 @ 07:30]  TSH 0.44      [12-13-18 @ 08:12]        RADIOLOGY & ADDITIONAL STUDIES:

## 2019-01-05 NOTE — PROGRESS NOTE ADULT - PROBLEM SELECTOR PLAN 1
Etiology?  possible GI loss/poor appetite, however urine k is elevated   Associated with palpitation/anxiety attack-consider GI evaluation to rule out carcinoid syndrome.  Rule out adrenal adenoma/hyperplasia causing hyperaldosteronism/Pheochromocytoma  Get Renin/Aldosterone/Metanephrine level  Urine K, 24 hrs urine K and Cr  Supplement KCL 120meq today. Etiology?  possible GI loss/poor appetite, however urine k is elevated   Associated with palpitation/anxiety attack-consider GI evaluation to rule out carcinoid syndrome, although there is no flushing.  Rule out adrenal adenoma/hyperplasia causing hyperaldosteronism/Pheochromocytoma  Get Renin/Aldosterone/Metanephrine level  Urine K is high, suggesting urinary loss, follow up 24 hrs urine K and Cr  Supplement KCL 40meq today.

## 2019-01-05 NOTE — CONSULT NOTE ADULT - ASSESSMENT
ASSESSMENT:    multifactorial hypoxic respiratory failure:    1) moderate obstructive lung disease - suspect severe reduction in diffusing capacity given severe emphysema on chest CT  2) restrictive lung disease due to obesity and a very large hiatal hernia compressing the left lung     moderate pulmonary hypertension due to chronic hypoxemia ASSESSMENT:    multifactorial hypoxic respiratory failure:    1) moderate obstructive lung disease - suspect a severe reduction in diffusing capacity given severe emphysema on chest CT  2) restrictive lung disease due to obesity and a very large hiatal hernia compressing the left lung     moderate pulmonary hypertension due to chronic hypoxemia    recurrent symptomatic electrolyte abnormalities - hypomagnesemia and hypokalemia    PLAN/RECOMMENDATIONS:    oxygen supplementation to keep saturation greater than 92% at rest and with exertion   agree with discontinuation of prednisone which was to be stopped tomorrow   symbicort 160/4.5mcg 2 puffs 2 times daily  cardiac meds: toprol XL/lipitor  glucose control  supplementing K and Mg  evaluation of electrolyte abnormalities -> nephrology and endocrinology evaluations    Thank you for the courtesy of this referral. Plan of care discussed with the patient and her family at bedside and Dr. Osborne via text.    Phong Iglesias MD, [facility] - [phone]  Pulmonary Medicine

## 2019-01-05 NOTE — CONSULT NOTE ADULT - REASON FOR ADMISSION
symptomatic electrolyte abnormalities symptomatic electrolyte abnormalities - hypokalemia - hypomagnesemia

## 2019-01-05 NOTE — PROGRESS NOTE ADULT - SUBJECTIVE AND OBJECTIVE BOX
INTERVAL HPI/OVERNIGHT EVENTS:  Pt seen and examined at bedside.     Allergies/Intolerance: No Known Allergies      MEDICATIONS  (STANDING):  ALBUTerol    90 MICROgram(s) HFA Inhaler 2 Puff(s) Inhalation every 8 hours  atorvastatin 40 milliGRAM(s) Oral at bedtime  dextrose 5%. 1000 milliLiter(s) (50 mL/Hr) IV Continuous <Continuous>  dextrose 50% Injectable 12.5 Gram(s) IV Push once  dextrose 50% Injectable 25 Gram(s) IV Push once  dextrose 50% Injectable 25 Gram(s) IV Push once  heparin  Injectable 5000 Unit(s) SubCutaneous every 12 hours  insulin lispro (HumaLOG) corrective regimen sliding scale   SubCutaneous three times a day before meals  insulin lispro (HumaLOG) corrective regimen sliding scale   SubCutaneous at bedtime  magnesium oxide 400 milliGRAM(s) Oral two times a day with meals  metoprolol succinate  milliGRAM(s) Oral daily    MEDICATIONS  (PRN):  dextrose 40% Gel 15 Gram(s) Oral once PRN Blood Glucose LESS THAN 70 milliGRAM(s)/deciliter  glucagon  Injectable 1 milliGRAM(s) IntraMuscular once PRN Glucose LESS THAN 70 milligrams/deciliter        ROS: all systems reviewed and wnl      PHYSICAL EXAMINATION:  Vital Signs Last 24 Hrs  T(C): 36.3 (2019 04:26), Max: 37.4 (2019 20:07)  T(F): 97.4 (2019 04:26), Max: 99.3 (2019 20:07)  HR: 91 (2019 04:26) (74 - 170)  BP: 136/81 (2019 04:26) (116/64 - 178/78)  BP(mean): --  RR: 19 (2019 04:26) (18 - 24)  SpO2: 90% (2019 04:26) (90% - 95%)  CAPILLARY BLOOD GLUCOSE      POCT Blood Glucose.: 219 mg/dL (2019 20:26)  POCT Blood Glucose.: 183 mg/dL (2019 12:21)       @ 07:01  -  05 @ 07:00  --------------------------------------------------------  IN: 0 mL / OUT: 100 mL / NET: -100 mL        GENERAL:   NECK: supple, No JVD  CHEST/LUNG: clear to auscultation bilaterally; no rales, rhonchi, or wheezing b/l  HEART: normal S1, S2  ABDOMEN: BS+, soft, ND, NT   EXTREMITIES:  pulses palpable; no clubbing, cyanosis, or edema b/l LEs  SKIN: no rashes or lesions      LABS:                        12.7   12.7  )-----------( 273      ( 2019 12:50 )             37.8     -    138  |  99  |  14  ----------------------------<  233<H>  3.7   |  18<L>  |  0.82    Ca    8.0<L>      2019 20:36  Phos  3.1     -  Mg     <.6         TPro  7.1  /  Alb  3.9  /  TBili  0.6  /  DBili  x   /  AST  12  /  ALT  18  /  AlkPhos  57  -    PT/INR - ( 2019 12:50 )   PT: 14.8 sec;   INR: 1.28 ratio         PTT - ( 2019 12:50 )  PTT:25.0 sec  Urinalysis Basic - ( 2019 14:53 )    Color: Yellow / Appearance: Clear / S.030 / pH: x  Gluc: x / Ketone: Moderate  / Bili: Negative / Urobili: 2 mg/dL   Blood: x / Protein: 100 mg/dL / Nitrite: Negative   Leuk Esterase: Large / RBC: 4 /hpf / WBC 15 /hpf   Sq Epi: x / Non Sq Epi: 6 /hpf / Bacteria: Few INTERVAL HPI/OVERNIGHT EVENTS:  Pt seen and examined at bedside.     Allergies/Intolerance: No Known Allergies      MEDICATIONS  (STANDING):  ALBUTerol    90 MICROgram(s) HFA Inhaler 2 Puff(s) Inhalation every 8 hours  atorvastatin 40 milliGRAM(s) Oral at bedtime  dextrose 5%. 1000 milliLiter(s) (50 mL/Hr) IV Continuous <Continuous>  dextrose 50% Injectable 12.5 Gram(s) IV Push once  dextrose 50% Injectable 25 Gram(s) IV Push once  dextrose 50% Injectable 25 Gram(s) IV Push once  heparin  Injectable 5000 Unit(s) SubCutaneous every 12 hours  insulin lispro (HumaLOG) corrective regimen sliding scale   SubCutaneous three times a day before meals  insulin lispro (HumaLOG) corrective regimen sliding scale   SubCutaneous at bedtime  magnesium oxide 400 milliGRAM(s) Oral two times a day with meals  metoprolol succinate  milliGRAM(s) Oral daily    MEDICATIONS  (PRN):  dextrose 40% Gel 15 Gram(s) Oral once PRN Blood Glucose LESS THAN 70 milliGRAM(s)/deciliter  glucagon  Injectable 1 milliGRAM(s) IntraMuscular once PRN Glucose LESS THAN 70 milligrams/deciliter        ROS: all systems reviewed and wnl      PHYSICAL EXAMINATION:  Vital Signs Last 24 Hrs  T(C): 36.3 (2019 04:26), Max: 37.4 (2019 20:07)  T(F): 97.4 (2019 04:26), Max: 99.3 (2019 20:07)  HR: 91 (2019 04:26) (74 - 170)  BP: 136/81 (2019 04:26) (116/64 - 178/78)  BP(mean): --  RR: 19 (2019 04:26) (18 - 24)  SpO2: 90% (2019 04:26) (90% - 95%)  CAPILLARY BLOOD GLUCOSE      POCT Blood Glucose.: 219 mg/dL (2019 20:26)  POCT Blood Glucose.: 183 mg/dL (2019 12:21)       @ 07:01  -  05 @ 07:00  --------------------------------------------------------  IN: 0 mL / OUT: 100 mL / NET: -100 mL        GENERAL: stable in bed, NAD, no fevers or SOB  NECK: supple, No JVD  CHEST/LUNG: clear to auscultation bilaterally; no rales, rhonchi, or wheezing b/l  HEART: normal S1, S2  ABDOMEN: BS+, soft, ND, NT   EXTREMITIES:  pulses palpable; no clubbing, cyanosis, or edema b/l LEs  SKIN: no rashes or lesions      LABS:                        12.7   12.7  )-----------( 273      ( 2019 12:50 )             37.8     -    138  |  99  |  14  ----------------------------<  233<H>  3.7   |  18<L>  |  0.82    Ca    8.0<L>      2019 20:36  Phos  3.1     -  Mg     <.6         TPro  7.1  /  Alb  3.9  /  TBili  0.6  /  DBili  x   /  AST  12  /  ALT  18  /  AlkPhos  57  -    PT/INR - ( 2019 12:50 )   PT: 14.8 sec;   INR: 1.28 ratio         PTT - ( 2019 12:50 )  PTT:25.0 sec  Urinalysis Basic - ( 2019 14:53 )    Color: Yellow / Appearance: Clear / S.030 / pH: x  Gluc: x / Ketone: Moderate  / Bili: Negative / Urobili: 2 mg/dL   Blood: x / Protein: 100 mg/dL / Nitrite: Negative   Leuk Esterase: Large / RBC: 4 /hpf / WBC 15 /hpf   Sq Epi: x / Non Sq Epi: 6 /hpf / Bacteria: Few

## 2019-01-06 LAB
ANION GAP SERPL CALC-SCNC: 13 MMOL/L — SIGNIFICANT CHANGE UP (ref 5–17)
BUN SERPL-MCNC: 12 MG/DL — SIGNIFICANT CHANGE UP (ref 7–23)
CALCIUM SERPL-MCNC: 8 MG/DL — LOW (ref 8.4–10.5)
CHLORIDE SERPL-SCNC: 100 MMOL/L — SIGNIFICANT CHANGE UP (ref 96–108)
CO2 SERPL-SCNC: 22 MMOL/L — SIGNIFICANT CHANGE UP (ref 22–31)
COLLECT DURATION TIME UR: 24 HR — SIGNIFICANT CHANGE UP
CREAT SERPL-MCNC: 0.86 MG/DL — SIGNIFICANT CHANGE UP (ref 0.5–1.3)
GLUCOSE BLDC GLUCOMTR-MCNC: 182 MG/DL — HIGH (ref 70–99)
GLUCOSE BLDC GLUCOMTR-MCNC: 190 MG/DL — HIGH (ref 70–99)
GLUCOSE BLDC GLUCOMTR-MCNC: 190 MG/DL — HIGH (ref 70–99)
GLUCOSE BLDC GLUCOMTR-MCNC: 338 MG/DL — HIGH (ref 70–99)
GLUCOSE SERPL-MCNC: 156 MG/DL — HIGH (ref 70–99)
HCT VFR BLD CALC: 40.2 % — SIGNIFICANT CHANGE UP (ref 34.5–45)
HGB BLD-MCNC: 13.1 G/DL — SIGNIFICANT CHANGE UP (ref 11.5–15.5)
MAGNESIUM SERPL-MCNC: 1.5 MG/DL — LOW (ref 1.6–2.6)
MCHC RBC-ENTMCNC: 31 PG — SIGNIFICANT CHANGE UP (ref 27–34)
MCHC RBC-ENTMCNC: 32.5 GM/DL — SIGNIFICANT CHANGE UP (ref 32–36)
MCV RBC AUTO: 95.5 FL — SIGNIFICANT CHANGE UP (ref 80–100)
PHOSPHATE SERPL-MCNC: 2 MG/DL — LOW (ref 2.5–4.5)
PLATELET # BLD AUTO: 223 K/UL — SIGNIFICANT CHANGE UP (ref 150–400)
POTASSIUM 24H UR-MRATE: 23 MMOL/24HR — LOW (ref 25–130)
POTASSIUM SERPL-MCNC: 4.3 MMOL/L — SIGNIFICANT CHANGE UP (ref 3.5–5.3)
POTASSIUM SERPL-SCNC: 4.3 MMOL/L — SIGNIFICANT CHANGE UP (ref 3.5–5.3)
RBC # BLD: 4.21 M/UL — SIGNIFICANT CHANGE UP (ref 3.8–5.2)
RBC # FLD: 14.1 % — SIGNIFICANT CHANGE UP (ref 10.3–14.5)
SODIUM SERPL-SCNC: 135 MMOL/L — SIGNIFICANT CHANGE UP (ref 135–145)
TOTAL VOLUME - 24 HOUR: 425 ML — SIGNIFICANT CHANGE UP
URINE CREATININE CALCULATION: 0.6 G/24 H — LOW (ref 0.8–1.8)
WBC # BLD: 7.9 K/UL — SIGNIFICANT CHANGE UP (ref 3.8–10.5)
WBC # FLD AUTO: 7.9 K/UL — SIGNIFICANT CHANGE UP (ref 3.8–10.5)

## 2019-01-06 RX ORDER — SODIUM,POTASSIUM PHOSPHATES 278-250MG
1 POWDER IN PACKET (EA) ORAL
Qty: 0 | Refills: 0 | Status: DISCONTINUED | OUTPATIENT
Start: 2019-01-06 | End: 2019-01-07

## 2019-01-06 RX ADMIN — Medication 1 TABLET(S): at 11:56

## 2019-01-06 RX ADMIN — Medication 0.5 MILLIGRAM(S): at 22:04

## 2019-01-06 RX ADMIN — Medication 20 MILLIEQUIVALENT(S): at 05:09

## 2019-01-06 RX ADMIN — Medication 4: at 13:24

## 2019-01-06 RX ADMIN — Medication 1: at 09:15

## 2019-01-06 RX ADMIN — MAGNESIUM OXIDE 400 MG ORAL TABLET 400 MILLIGRAM(S): 241.3 TABLET ORAL at 17:05

## 2019-01-06 RX ADMIN — MAGNESIUM OXIDE 400 MG ORAL TABLET 400 MILLIGRAM(S): 241.3 TABLET ORAL at 09:16

## 2019-01-06 RX ADMIN — Medication 1 TABLET(S): at 05:09

## 2019-01-06 RX ADMIN — Medication 1: at 18:01

## 2019-01-06 RX ADMIN — Medication 200 MILLIGRAM(S): at 05:09

## 2019-01-06 RX ADMIN — PANTOPRAZOLE SODIUM 40 MILLIGRAM(S): 20 TABLET, DELAYED RELEASE ORAL at 05:09

## 2019-01-06 RX ADMIN — BUDESONIDE AND FORMOTEROL FUMARATE DIHYDRATE 2 PUFF(S): 160; 4.5 AEROSOL RESPIRATORY (INHALATION) at 17:06

## 2019-01-06 RX ADMIN — Medication 1 TABLET(S): at 17:05

## 2019-01-06 RX ADMIN — HEPARIN SODIUM 5000 UNIT(S): 5000 INJECTION INTRAVENOUS; SUBCUTANEOUS at 09:24

## 2019-01-06 RX ADMIN — BUDESONIDE AND FORMOTEROL FUMARATE DIHYDRATE 2 PUFF(S): 160; 4.5 AEROSOL RESPIRATORY (INHALATION) at 05:09

## 2019-01-06 RX ADMIN — ATORVASTATIN CALCIUM 40 MILLIGRAM(S): 80 TABLET, FILM COATED ORAL at 22:00

## 2019-01-06 RX ADMIN — Medication 1 TABLET(S): at 21:59

## 2019-01-06 RX ADMIN — HEPARIN SODIUM 5000 UNIT(S): 5000 INJECTION INTRAVENOUS; SUBCUTANEOUS at 22:00

## 2019-01-06 RX ADMIN — MAGNESIUM OXIDE 400 MG ORAL TABLET 400 MILLIGRAM(S): 241.3 TABLET ORAL at 11:57

## 2019-01-06 NOTE — PROGRESS NOTE ADULT - PROBLEM SELECTOR PLAN 1
Etiology?  possible GI loss/poor appetite, however urine k is elevated   Associated with palpitation/anxiety attack-consider GI evaluation to rule out carcinoid syndrome, although there is no flushing.  Rule out adrenal adenoma/hyperplasia causing hyperaldosteronism/Pheochromocytoma  follow up Renin/Aldosterone/Metanephrine level  Urine K is high, suggesting urinary loss, follow up 24 hrs urine K and Cr

## 2019-01-06 NOTE — PROGRESS NOTE ADULT - ASSESSMENT
74 yo Female PMH COPD(on home O2 and prednisone 5mg), HTN, HLD, DM(II), presenting with dizziness x three days. Patient states symptoms began all of a sudden with lightheadedness and dizziness "like the room is spinning" for three days . Patient states she has associated nv and blurred vision with her symptoms . Patient states recently started on prednisone for COPD. Patient unsure what brings on her symptoms but symptoms are intermittent.     CV: Tele is NSR. Will stop tele. Continue Toprol  mg/day. BP at goal 136/80.     COPD: Change to Symbicort BID while in hospital.     Renal: Creatinine normal .88. K better 4.3, Magnesium better 1.5. Will increase Mag-oxide 400 mg tid and KCL bid. Follow up 24 hour urine collection as ordered for potasium, calcium and magnesium. Recent prednisone use can account for low K levels.   Calcium 8.1, add Oscal bid. Endo consult called to eval electrolytes. Discussed with renal attending in detail.

## 2019-01-06 NOTE — PROGRESS NOTE ADULT - ASSESSMENT
ASSESSMENT:    multifactorial hypoxic respiratory failure:    1) moderate obstructive lung disease - suspect a severe reduction in diffusing capacity given severe emphysema on chest CT  2) restrictive lung disease due to obesity and a very large hiatal hernia compressing the left lung     moderate pulmonary hypertension due to chronic hypoxemia    recurrent symptomatic electrolyte abnormalities - hypomagnesemia/hypophosphatemiz/hypokalemia    PLAN/RECOMMENDATIONS:    oxygen supplementation to keep saturation greater than 92% at rest and with exertion   agree with discontinuation of prednisone which was to be stopped today  symbicort 160/4.5mcg 2 puffs 2 times daily  cardiac meds: toprol XL/lipitor  glucose control  supplementing K, Mg, P  evaluation of electrolyte abnormalities -> nephrology and endocrinology evaluations    Will follow with you.    Phong Iglesias MD, [facility] - [phone]  Pulmonary Medicine

## 2019-01-06 NOTE — PROGRESS NOTE ADULT - SUBJECTIVE AND OBJECTIVE BOX
CARDIOLOGY     PROGRESS  NOTE   ________________________________________________    CHIEF COMPLAINT:Patient is a 73y old  Female who presents with a chief complaint of dizzy (06 Jan 2019 07:49)  doing better.  	  REVIEW OF SYSTEMS:  CONSTITUTIONAL: No fever, weight loss, or fatigue  EYES: No eye pain, visual disturbances, or discharge  ENT:  No difficulty hearing, tinnitus, vertigo; No sinus or throat pain  NECK: No pain or stiffness  RESPIRATORY: No cough, wheezing, chills or hemoptysis; No Shortness of Breath  CARDIOVASCULAR: No chest pain, palpitations, passing out, dizziness, or leg swelling  GASTROINTESTINAL: No abdominal or epigastric pain. No nausea, vomiting, or hematemesis; No diarrhea or constipation. No melena or hematochezia.  GENITOURINARY: No dysuria, frequency, hematuria, or incontinence  NEUROLOGICAL: No headaches, memory loss, loss of strength, numbness, or tremors  SKIN: No itching, burning, rashes, or lesions   LYMPH Nodes: No enlarged glands  ENDOCRINE: No heat or cold intolerance; No hair loss  MUSCULOSKELETAL: No joint pain or swelling; No muscle, back, or extremity pain  PSYCHIATRIC: No depression, anxiety, mood swings, or difficulty sleeping  HEME/LYMPH: No easy bruising, or bleeding gums  ALLERGY AND IMMUNOLOGIC: No hives or eczema	    [ ] All others negative	  [ ] Unable to obtain    PHYSICAL EXAM:  T(C): 37.2 (01-06-19 @ 04:22), Max: 37.2 (01-06-19 @ 04:22)  HR: 67 (01-06-19 @ 04:22) (67 - 84)  BP: 123/73 (01-06-19 @ 04:22) (111/72 - 123/73)  RR: 18 (01-06-19 @ 04:22) (18 - 18)  SpO2: 100% (01-06-19 @ 04:22) (90% - 100%)  Wt(kg): --  I&O's Summary    05 Jan 2019 07:01  -  06 Jan 2019 07:00  --------------------------------------------------------  IN: 490 mL / OUT: 500 mL / NET: -10 mL        Appearance: Normal	  HEENT:   Normal oral mucosa, PERRL, EOMI	  Lymphatic: No lymphadenopathy  Cardiovascular: Normal S1 S2, No JVD, + murmurs, No edema  Respiratory: Lungs clear to auscultation	  Psychiatry: A & O x 3, Mood & affect appropriate  Gastrointestinal:  Soft, Non-tender, + BS	  Skin: No rashes, No ecchymoses, No cyanosis	  Neurologic: Non-focal  Extremities: Normal range of motion, No clubbing, cyanosis or edema  Vascular: Peripheral pulses palpable 2+ bilaterally    MEDICATIONS  (STANDING):  ALPRAZolam 0.5 milliGRAM(s) Oral at bedtime  atorvastatin 40 milliGRAM(s) Oral at bedtime  buDESOnide 160 MICROgram(s)/formoterol 4.5 MICROgram(s) Inhaler 2 Puff(s) Inhalation two times a day  calcium carbonate 1250 mG  + Vitamin D (OsCal 500 + D) 1 Tablet(s) Oral two times a day  dextrose 5%. 1000 milliLiter(s) (50 mL/Hr) IV Continuous <Continuous>  dextrose 50% Injectable 12.5 Gram(s) IV Push once  dextrose 50% Injectable 25 Gram(s) IV Push once  dextrose 50% Injectable 25 Gram(s) IV Push once  heparin  Injectable 5000 Unit(s) SubCutaneous every 12 hours  insulin lispro (HumaLOG) corrective regimen sliding scale   SubCutaneous three times a day before meals  insulin lispro (HumaLOG) corrective regimen sliding scale   SubCutaneous at bedtime  magnesium oxide 400 milliGRAM(s) Oral three times a day with meals  metoprolol succinate  milliGRAM(s) Oral daily  pantoprazole    Tablet 40 milliGRAM(s) Oral before breakfast  potassium chloride    Tablet ER 20 milliEquivalent(s) Oral two times a day      TELEMETRY: 	    ECG:  	  RADIOLOGY:  OTHER: 	  	  LABS:	 	    CARDIAC MARKERS:  CARDIAC MARKERS ( 04 Jan 2019 12:51 )  x     / x     / 17 U/L / x     / x                                    13.1   7.9   )-----------( 223      ( 06 Jan 2019 06:35 )             40.2     01-06    135  |  100  |  12  ----------------------------<  156<H>  4.3   |  22  |  0.86    Ca    8.0<L>      06 Jan 2019 06:33  Phos  2.0     01-06  Mg     1.5     01-06    TPro  7.1  /  Alb  3.9  /  TBili  0.6  /  DBili  x   /  AST  12  /  ALT  18  /  AlkPhos  57  01-04    proBNP:   Lipid Profile:   HgA1c: Hemoglobin A1C, Whole Blood: 7.7 % (12-13 @ 07:30)    TSH: Thyroid Stimulating Hormone, Serum: 0.44 uIU/mL (12-13 @ 08:12)    PT/INR - ( 04 Jan 2019 12:50 )   PT: 14.8 sec;   INR: 1.28 ratio         PTT - ( 04 Jan 2019 12:50 )  PTT:25.0 sec      Assessment and plan  ---------------------------  agree to r/o carcinoid syndrome  continue bp meds  home o2  dvt prophylaxis

## 2019-01-06 NOTE — PROGRESS NOTE ADULT - ASSESSMENT
72 yo F with pmhx COPD (on home O2 and prednisone 5mg) , htn  , hld  and dm  2, presenting with dizziness x three days. She was found to have hypokalemia, hypomagnesemia, she was recently also admitted for hypokalemia. She occasionally has lose bowel motion and she had that few times last night, also has poor appetite. While talking to her she started shaking, developed tachycardia, had to be given xanax to calm her down.

## 2019-01-06 NOTE — PROGRESS NOTE ADULT - SUBJECTIVE AND OBJECTIVE BOX
[facility] - [facility]     PROGRESS NOTE    CHIEF COMPLAINT: chronic hypoxic respiratory failure; COPD; emphysema; hiatal hernia; atelectasis; pulmonary hypertension; electrolyte abnormalities    INTERVAL HISTORY: now with hypophosphatemia in addition to hypokalemia and hypomagnesemia; reports alteration in her taste over the last several weeks; no further dizziness or lightheadedness with the room "spinning"; improved appetite which still is not great; no sweats; sitting in the chair without shortness of breath on a nasal canula, cough, sputum production, chest congestion or wheeze; no fevers, chills or sweats; no chest pain/pressure or palpitations    REVIEW OF SYSTEMS:  Constitutional: As per interval history  HEENT: Within normal limits  CV: As per interval history  Resp: As per interval history  GI: Within normal limits   : Within normal limits  Musculoskeletal: Within normal limits  Skin: Within normal limits  Neurological: Within normal limits  Psychiatric: Within normal limits  Endocrine: As per interval history  Hematologic/Lymphatic: Within normal limits  Allergic/Immunologic: Within normal limits    MEDICATIONS:     Pulmonary "  buDESOnide 160 MICROgram(s)/formoterol 4.5 MICROgram(s) Inhaler 2 Puff(s) Inhalation two times a day      Anti-microbials:      Cardiovascular:  metoprolol succinate  milliGRAM(s) Oral daily      Other:  ALPRAZolam 0.5 milliGRAM(s) Oral at bedtime  atorvastatin 40 milliGRAM(s) Oral at bedtime  calcium carbonate 1250 mG  + Vitamin D (OsCal 500 + D) 1 Tablet(s) Oral two times a day  dextrose 40% Gel 15 Gram(s) Oral once PRN  dextrose 5%. 1000 milliLiter(s) IV Continuous <Continuous>  dextrose 50% Injectable 12.5 Gram(s) IV Push once  dextrose 50% Injectable 25 Gram(s) IV Push once  dextrose 50% Injectable 25 Gram(s) IV Push once  glucagon  Injectable 1 milliGRAM(s) IntraMuscular once PRN  heparin  Injectable 5000 Unit(s) SubCutaneous every 12 hours  insulin lispro (HumaLOG) corrective regimen sliding scale   SubCutaneous three times a day before meals  insulin lispro (HumaLOG) corrective regimen sliding scale   SubCutaneous at bedtime  magnesium oxide 400 milliGRAM(s) Oral three times a day with meals  pantoprazole    Tablet 40 milliGRAM(s) Oral before breakfast  potassium acid phosphate/sodium acid phosphate tablet (K-PHOS No. 2) 1 Tablet(s) Oral four times a day with meals        OBJECTIVE:    I&O's Detail    2019 07:01  -  2019 07:00  --------------------------------------------------------  IN:    Oral Fluid: 490 mL  Total IN: 490 mL    OUT:    Voided: 500 mL  Total OUT: 500 mL    Total NET: -10 mL      2019 07:01  -  2019 13:06  --------------------------------------------------------  IN:    Oral Fluid: 360 mL  Total IN: 360 mL    OUT:    Voided: 300 mL  Total OUT: 300 mL    Total NET: 60 mL    Daily Weight in k (2019 04:22)    POCT Blood Glucose.: 190 mg/dL (2019 08:33)  POCT Blood Glucose.: 150 mg/dL (2019 22:02)  POCT Blood Glucose.: 127 mg/dL (2019 18:05)      PHYSICAL EXAM:       ICU Vital Signs Last 24 Hrs  T(C): 37.2 (2019 04:22), Max: 37.2 (2019 04:22)  T(F): 99 (2019 04:22), Max: 99 (2019 04:22)  HR: 67 (2019 04:22) (67 - 79)  BP: 123/73 (2019 04:22) (111/72 - 123/73)  BP(mean): --  ABP: --  ABP(mean): --  RR: 18 (2019 04:22) (18 - 18)  SpO2: 100% (2019 04:22) (92% - 100%) on 2lpm nasal canula     General: Awake. Alert. Cooperative. No distress. Appears stated age 	  HEENT:   Atraumatic. Normocephalic. Anicteric. Normal oral mucosa. PERRL. EOMI.  Neck: Supple. Trachea midline. Thyroid without enlargement/tenderness/nodules. No carotid bruit. No JVD.	  Cardiovascular: Regular rate and rhythm. S1 S2 normal. No murmurs, rubs or gallops.  Respiratory: Respirations unlabored. Bibasilar rales right > left. No curvature.  Abdomen: Soft. Non-tender. Non-distended. No organomegaly. No masses. Normal bowel sounds.  Extremities: Warm to touch. No clubbing or cyanosis. No pedal edema.  Pulses: 2+ peripheral pulses all extremities.	  Skin: Normal skin color. No rashes or lesions. No ecchymoses. No cyanosis. Warm to touch.  Lymph Nodes: Cervical, supraclavicular and axillary nodes normal  Neurological: Motor and sensory examination equal and normal. A and O x 3  Psychiatry: Appropriate mood and affect.    LABS:                        13.1   7.9   )-----------( 223      ( 2019 06:35 )             40.2                         11.5   9.0   )-----------( 244      ( 2019 07:09 )             36.5     01-06    135  |  100  |  12  ----------------------------<  156<H>  4.3   |  22  |  0.86    -    140  |  103  |  12  ----------------------------<  108<H>  4.0   |  22  |  0.88    Ca      8.0<L>          Ca      8.1<L>          Phos    2.0     -    Phos    3.1     -      Mg       1.5     -06    Mg       1.0     -05    TPro  7.1  /  Alb  3.9  /  TBili  0.6  /  DBili  x   /  AST  12  /  ALT  18  /  AlkPhos  57      PT/INR - ( 2019 12:50 )   PT: 14.8 sec;   INR: 1.28 ratio       PTT - ( 2019 12:50 )  PTT:25.0 sec    CARDIAC MARKERS ( 2019 12:51 )  x     / x     / 17 U/L / x     / x        < from: Transthoracic Echocardiogram (18 @ 15:46) >    Patient name: GOLDBERG, EDITH  YOB: 1945   Age: 73 (F)   MR#: [id number]  Study Date: 2018  Location: [address] [place], [place] [id number]: Elvi Condon [facility]  Study quality: Technically good  Referring Physician: Luis Alberto Osborne MD  Blood Pressure: 99/60 mmHg  Height: 163 cm  Weight: 73 kg  BSA: 1.8 m2  ------------------------------------------------------------------------  PROCEDURE: Transthoracic echocardiogram with 2-D, M-Mode  and complete spectral and color flow Doppler. Patient was  injected with 10 cc's of aerosolized saline. Patient was  injected with 10 cc's of aerosolized saline.  INDICATION: Dyspnea, unspecified (R06.00)  ------------------------------------------------------------------------  Dimensions:    Normal Values:  LA:     2.4    2.0 - 4.0 cm  Ao:     2.7    2.0 - 3.8 cm  SEPTUM: 0.7    0.6 - 1.2 cm  PWT:    0.7    0.6 - 1.1 cm  LVIDd:  3.9    3.0 - 5.6 cm  LVIDs:  2.5    1.8 - 4.0 cm  Derived variables:  LVMI: 42 g/m2  RWT: 0.35  Fractional short: 36 %  EF (Ailin): 66 %  Doppler Peak Velocity (m/sec): AoV=1.8 PV=0.8  ------------------------------------------------------------------------  Observations:  Mitral Valve: Normal mitral valve. Minimal mitral  regurgitation.  Aortic Valve/Aorta: Normal trileaflet aortic valve. Peak  transaortic valve gradient equals 13 mm Hg, consistent with  mild aortic stenosis. Peak left ventricular outflow tract  gradient equals 6 mm Hg, mean gradient is equal to 4 mm Hg,  LVOT velocity time integral equals 29 cm.  Aortic Root: 2.7 cm.  LVOT diameter: 2 cm.  Left Atrium: Normal left atrium.  LA volume index = 17  cc/m2.  Left Ventricle: Normal left ventricular systolic function.  No segmental wall motion abnormalities. Normal left  ventricular internal dimensions and wall thicknesses.  Right Heart: Normal right atrium. Normal right ventricular  size and function. Normal tricuspid valve. Normal pulmonic  valve.  Pericardium/Pleura: Normal pericardium with no pericardial  effusion.  Hemodynamic: Estimated right atrial pressure is 8 mm Hg.  Estimated right ventricular systolic pressure equals 49 mm  Hg, assuming right atrial pressure equals 8 mm Hg,  consistent with mild pulmonary hypertension. Agitated  saline injection demonstrates no evidence of a patent  foramen ovale.  ------------------------------------------------------------------------  Conclusions:  1. Normal mitral valve. Minimal mitral regurgitation.  2. Normal trileaflet aortic valve.  3. Normal left ventricular internal dimensions and wall  thicknesses.  4. Normal left ventricular systolic function. No segmental  wall motion abnormalities.  5. Normal right ventricular size and function.  6. Estimated right ventricular systolic pressure equals 49  mm Hg, assuming right atrial pressure equals 8 mm Hg,  consistent with mild pulmonary hypertension.  7. Agitated saline injection demonstrates no evidence of a  patent foramen ovale.  *** No previous Echo exam.  ------------------------------------------------------------------------  Confirmed on  2018 - 18:22:37 by Vahe Dodge MD  ------------------------------------------------------------------------    < end of copied text >  ---------------------------------------------------------------------------------------------------------------  MICROBIOLOGY:   Urinalysis Basic - ( 2019 14:53 )    Color: Yellow / Appearance: Clear / S.030 / pH: x  Gluc: x / Ketone: Moderate  / Bili: Negative / Urobili: 2 mg/dL   Blood: x / Protein: 100 mg/dL / Nitrite: Negative   Leuk Esterase: Large / RBC: 4 /hpf / WBC 15 /hpf   Sq Epi: x / Non Sq Epi: 6 /hpf / Bacteria: Few    Culture - Urine (19 @ 18:26)    Specimen Source: .Urine Clean Catch (Midstream)    Culture Results:   Culture grew 3 or more types of organisms which indicate  collection contamination; consider recollection only if clinically  indicated.    RADIOLOGY:  [x] Chest radiographs reviewed and interpreted by me    < from: CT Head No Cont (19 @ 14:03) >    EXAM:  CT BRAIN                          PROCEDURE DATE:  2019      INTERPRETATION:  CLINICAL INFORMATION: Dizziness. Nausea.    TECHNIQUE: Noncontrast axial CT images were acquired through the head.   Two-dimensional sagittal and coronal reformats were generated.    COMPARISON STUDY: None    FINDINGS:   There is no CT evidence of acute territorial infarct, acute intracranial   hemorrhage, extra-axial collection, mass effect, midline shift or   hydrocephalus.     Ventricles and sulci are age-appropriate.   Moderate  white matter   microvascular ischemic-type changes with patchy hypodensities.    The visualized paranasal sinuses are clear. The mastoid air cells and   middle ear cavities are clear. Right lens removal status postcataract   surgery.    The soft tissues of the scalp are unremarkable.  The calvarium is intact.    IMPRESSION:     No CT evidence of acute territorial infarct, intracranial hemorrhage,   brain edema, or mass effect.    JUAN MANUEL HOLCOMB M.D., RADIOLOGY RESIDENT  This document has been electronically signed.  MABEL VEGA M.D., ATTENDING RADIOLOGIST  This document has been electronically signed. 2019  2:21PM      < end of copied text >  ---------------------------------------------------------------------------------------------------------------  < from: Xray Chest 2 Views PA/Lat (18 @ 12:23) >    EXAM:  XR CHEST PA LAT 2V                          PROCEDURE DATE:  2018      INTERPRETATION:  PA and lateral projection of the chest was obtained on   2018.    Indication: Cough.    Impression:    The heart is normal in size. The lungs are clear. A big hiatal hernia is   seen on the left. No change in the appearance of the chest when compared   to previous study done 2018.    WILBUR GOMEZ M.D., ATTENDING RADIOLOGIST  This document has been electronically signed. Dec 11 2018  1:12PM    < end of copied text >  ---------------------------------------------------------------------------------------------------------------    < from: CT Angio Chest w/ IV Cont (18 @ 12:58) >    EXAM:  CT ANGIO CHEST (W)AW IC                          PROCEDURE DATE:  2018      INTERPRETATION:  Clinical information: Hypoxia. Evaluate for pulmonary   embolus.    CT angiogram of the chest was obtained following administration of  intravenous contrast. Approximately 90 cc of Omnipaque 350 was   administered and 10 cc was discarded. Coronal, sagittal and MIP images   were submitted for review.    Few lymph nodes are present in the pretracheal space and the AP window.    Heart is normal in size. Calcification the coronary arteries is noted. No   pericardial effusion is noted. Pulmonary arteries are normal in caliber.   No filling defects are noted.    Very large hiatal hernia is noted.    No endobronchial lesions are noted. 0.4 cm solid nodule is noted in the   right middle lobe. Centrilobular emphysema is noted bilaterally. No   pleural effusions are noted.    Below the diaphragm, visualized portions of the abdomen are unremarkable.     Degenerative changes of the spine are noted.    Impression: No pulmonary embolus is noted.    Very large hiatal hernia.    KARINE MCDOWELL M.D., ATTENDING RADIOLOGIST  This document has been electronically signed. Dec 11 2018  1:12PM     < end of copied text >  ---------------------------------------------------------------------------------------------------------------  SPIROMETRY (2018)    FEV1 1.19 liters - 53% predicted  FVC 2.06 liters - 69% predicted  FEV1% 58    c/w moderate obstructive lung disease

## 2019-01-06 NOTE — PROGRESS NOTE ADULT - SUBJECTIVE AND OBJECTIVE BOX
Dr. Ashby  Office [phone]  Cell [phone]  Shara FERNANDEZ  Cell [phone]      Patient is a 73y old  Female who presents with a chief complaint of dizzy (06 Jan 2019 08:47)      Patient seen and examined at bedside. No chest pain/sob    VITALS:  T(F): 99.8 (01-06-19 @ 13:00), Max: 99.8 (01-06-19 @ 13:00)  HR: 90 (01-06-19 @ 13:00)  BP: 107/62 (01-06-19 @ 13:00)  RR: 18 (01-06-19 @ 13:00)  SpO2: 93% (01-06-19 @ 13:00)  Wt(kg): --    01-05 @ 07:01  -  01-06 @ 07:00  --------------------------------------------------------  IN: 490 mL / OUT: 500 mL / NET: -10 mL    01-06 @ 07:01  -  01-06 @ 15:23  --------------------------------------------------------  IN: 720 mL / OUT: 300 mL / NET: 420 mL          PHYSICAL EXAM:  Constitutional: NAD  Neck: No JVD  Respiratory: CTAB, no wheezes, rales or rhonchi  Cardiovascular: S1, S2, RRR  Gastrointestinal: BS+, soft, NT/ND  Extremities: No peripheral edema    Hospital Medications:   MEDICATIONS  (STANDING):  ALPRAZolam 0.5 milliGRAM(s) Oral at bedtime  atorvastatin 40 milliGRAM(s) Oral at bedtime  buDESOnide 160 MICROgram(s)/formoterol 4.5 MICROgram(s) Inhaler 2 Puff(s) Inhalation two times a day  calcium carbonate 1250 mG  + Vitamin D (OsCal 500 + D) 1 Tablet(s) Oral two times a day  dextrose 5%. 1000 milliLiter(s) (50 mL/Hr) IV Continuous <Continuous>  dextrose 50% Injectable 12.5 Gram(s) IV Push once  dextrose 50% Injectable 25 Gram(s) IV Push once  dextrose 50% Injectable 25 Gram(s) IV Push once  heparin  Injectable 5000 Unit(s) SubCutaneous every 12 hours  insulin lispro (HumaLOG) corrective regimen sliding scale   SubCutaneous three times a day before meals  insulin lispro (HumaLOG) corrective regimen sliding scale   SubCutaneous at bedtime  magnesium oxide 400 milliGRAM(s) Oral three times a day with meals  metoprolol succinate  milliGRAM(s) Oral daily  pantoprazole    Tablet 40 milliGRAM(s) Oral before breakfast  potassium acid phosphate/sodium acid phosphate tablet (K-PHOS No. 2) 1 Tablet(s) Oral four times a day with meals      LABS:  01-06    135  |  100  |  12  ----------------------------<  156<H>  4.3   |  22  |  0.86    Ca    8.0<L>      06 Jan 2019 06:33  Phos  2.0     01-06  Mg     1.5     01-06      Creatinine Trend: 0.86 <--, 0.88 <--, 0.82 <--, 0.83 <--    Phosphorus Level, Serum: 2.0 mg/dL (01-06 @ 06:33)                              13.1   7.9   )-----------( 223      ( 06 Jan 2019 06:35 )             40.2     Urine Studies:  Urinalysis - [01-04-19 @ 14:53]      Color Yellow / Appearance Clear / SG 1.030 / pH 6.0      Gluc Negative / Ketone Moderate  / Bili Negative / Urobili 2 mg/dL       Blood Trace / Protein 100 mg/dL / Leuk Est Large / Nitrite Negative      RBC 4 / WBC 15 / Hyaline 1 / Gran  / Sq Epi  / Non Sq Epi 6 / Bacteria Few    Urine Potassium 50      [01-05-19 @ 06:30]    PTH -- (Ca 6.4)      [12-11-18 @ 15:10]   26  HbA1c 7.7      [12-13-18 @ 07:30]  TSH 0.44      [12-13-18 @ 08:12]        RADIOLOGY & ADDITIONAL STUDIES:

## 2019-01-06 NOTE — PROGRESS NOTE ADULT - SUBJECTIVE AND OBJECTIVE BOX
INTERVAL HPI/OVERNIGHT EVENTS:  Pt seen and examined at bedside.     Allergies/Intolerance: No Known Allergies      MEDICATIONS  (STANDING):  ALPRAZolam 0.5 milliGRAM(s) Oral at bedtime  atorvastatin 40 milliGRAM(s) Oral at bedtime  buDESOnide 160 MICROgram(s)/formoterol 4.5 MICROgram(s) Inhaler 2 Puff(s) Inhalation two times a day  calcium carbonate 1250 mG  + Vitamin D (OsCal 500 + D) 1 Tablet(s) Oral two times a day  dextrose 5%. 1000 milliLiter(s) (50 mL/Hr) IV Continuous <Continuous>  dextrose 50% Injectable 12.5 Gram(s) IV Push once  dextrose 50% Injectable 25 Gram(s) IV Push once  dextrose 50% Injectable 25 Gram(s) IV Push once  heparin  Injectable 5000 Unit(s) SubCutaneous every 12 hours  insulin lispro (HumaLOG) corrective regimen sliding scale   SubCutaneous three times a day before meals  insulin lispro (HumaLOG) corrective regimen sliding scale   SubCutaneous at bedtime  magnesium oxide 400 milliGRAM(s) Oral three times a day with meals  metoprolol succinate  milliGRAM(s) Oral daily  pantoprazole    Tablet 40 milliGRAM(s) Oral before breakfast  potassium chloride    Tablet ER 20 milliEquivalent(s) Oral two times a day    MEDICATIONS  (PRN):  dextrose 40% Gel 15 Gram(s) Oral once PRN Blood Glucose LESS THAN 70 milliGRAM(s)/deciliter  glucagon  Injectable 1 milliGRAM(s) IntraMuscular once PRN Glucose LESS THAN 70 milligrams/deciliter        ROS: all systems reviewed and wnl      PHYSICAL EXAMINATION:  Vital Signs Last 24 Hrs  T(C): 37.2 (2019 04:22), Max: 37.2 (2019 04:22)  T(F): 99 (2019 04:22), Max: 99 (2019 04:22)  HR: 67 (2019 04:22) (67 - 84)  BP: 123/73 (2019 04:22) (111/72 - 123/73)  BP(mean): --  RR: 18 (2019 04:22) (18 - 18)  SpO2: 100% (2019 04:22) (90% - 100%)  CAPILLARY BLOOD GLUCOSE      POCT Blood Glucose.: 150 mg/dL (2019 22:02)  POCT Blood Glucose.: 127 mg/dL (2019 18:05)  POCT Blood Glucose.: 152 mg/dL (2019 12:57)  POCT Blood Glucose.: 203 mg/dL (2019 09:06)       @ 07:01  -  01- @ 07:00  --------------------------------------------------------  IN: 490 mL / OUT: 500 mL / NET: -10 mL        GENERAL:   NECK: supple, No JVD  CHEST/LUNG: clear to auscultation bilaterally; no rales, rhonchi, or wheezing b/l  HEART: normal S1, S2  ABDOMEN: BS+, soft, ND, NT   EXTREMITIES:  pulses palpable; no clubbing, cyanosis, or edema b/l LEs  SKIN: no rashes or lesions      LABS:                        13.1   7.9   )-----------( 223      ( 2019 06:35 )             40.2     01-    135  |  100  |  12  ----------------------------<  156<H>  4.3   |  22  |  0.86    Ca    8.0<L>      2019 06:33  Phos  2.0     -  Mg     1.5         TPro  7.1  /  Alb  3.9  /  TBili  0.6  /  DBili  x   /  AST  12  /  ALT  18  /  AlkPhos  57  01-04    PT/INR - ( 2019 12:50 )   PT: 14.8 sec;   INR: 1.28 ratio         PTT - ( 2019 12:50 )  PTT:25.0 sec  Urinalysis Basic - ( 2019 14:53 )    Color: Yellow / Appearance: Clear / S.030 / pH: x  Gluc: x / Ketone: Moderate  / Bili: Negative / Urobili: 2 mg/dL   Blood: x / Protein: 100 mg/dL / Nitrite: Negative   Leuk Esterase: Large / RBC: 4 /hpf / WBC 15 /hpf   Sq Epi: x / Non Sq Epi: 6 /hpf / Bacteria: Few INTERVAL HPI/OVERNIGHT EVENTS:  Pt seen and examined at bedside.     Allergies/Intolerance: No Known Allergies      MEDICATIONS  (STANDING):  ALPRAZolam 0.5 milliGRAM(s) Oral at bedtime  atorvastatin 40 milliGRAM(s) Oral at bedtime  buDESOnide 160 MICROgram(s)/formoterol 4.5 MICROgram(s) Inhaler 2 Puff(s) Inhalation two times a day  calcium carbonate 1250 mG  + Vitamin D (OsCal 500 + D) 1 Tablet(s) Oral two times a day  dextrose 5%. 1000 milliLiter(s) (50 mL/Hr) IV Continuous <Continuous>  dextrose 50% Injectable 12.5 Gram(s) IV Push once  dextrose 50% Injectable 25 Gram(s) IV Push once  dextrose 50% Injectable 25 Gram(s) IV Push once  heparin  Injectable 5000 Unit(s) SubCutaneous every 12 hours  insulin lispro (HumaLOG) corrective regimen sliding scale   SubCutaneous three times a day before meals  insulin lispro (HumaLOG) corrective regimen sliding scale   SubCutaneous at bedtime  magnesium oxide 400 milliGRAM(s) Oral three times a day with meals  metoprolol succinate  milliGRAM(s) Oral daily  pantoprazole    Tablet 40 milliGRAM(s) Oral before breakfast  potassium chloride    Tablet ER 20 milliEquivalent(s) Oral two times a day    MEDICATIONS  (PRN):  dextrose 40% Gel 15 Gram(s) Oral once PRN Blood Glucose LESS THAN 70 milliGRAM(s)/deciliter  glucagon  Injectable 1 milliGRAM(s) IntraMuscular once PRN Glucose LESS THAN 70 milligrams/deciliter        ROS: all systems reviewed and wnl      PHYSICAL EXAMINATION:  Vital Signs Last 24 Hrs  T(C): 37.2 (2019 04:22), Max: 37.2 (2019 04:22)  T(F): 99 (2019 04:22), Max: 99 (2019 04:22)  HR: 67 (2019 04:22) (67 - 84)  BP: 123/73 (2019 04:22) (111/72 - 123/73)  BP(mean): --  RR: 18 (2019 04:22) (18 - 18)  SpO2: 100% (2019 04:22) (90% - 100%)  CAPILLARY BLOOD GLUCOSE      POCT Blood Glucose.: 150 mg/dL (2019 22:02)  POCT Blood Glucose.: 127 mg/dL (2019 18:05)  POCT Blood Glucose.: 152 mg/dL (2019 12:57)  POCT Blood Glucose.: 203 mg/dL (2019 09:06)       @ 07:01  -   @ 07:00  --------------------------------------------------------  IN: 490 mL / OUT: 500 mL / NET: -10 mL        GENERAL: stable in bed, comfortable, alert, no SOB or wheeze   NECK: supple, No JVD  CHEST/LUNG: clear to auscultation bilaterally; no rales, rhonchi, or wheezing b/l  HEART: normal S1, S2  ABDOMEN: BS+, soft, ND, NT   EXTREMITIES:  pulses palpable; no clubbing, cyanosis, or edema b/l LEs  SKIN: no rashes or lesions      LABS:                        13.1   7.9   )-----------( 223      ( 2019 06:35 )             40.2     -    135  |  100  |  12  ----------------------------<  156<H>  4.3   |  22  |  0.86    Ca    8.0<L>      2019 06:33  Phos  2.0       Mg     1.5         TPro  7.1  /  Alb  3.9  /  TBili  0.6  /  DBili  x   /  AST  12  /  ALT  18  /  AlkPhos  57  -04    PT/INR - ( 2019 12:50 )   PT: 14.8 sec;   INR: 1.28 ratio         PTT - ( 2019 12:50 )  PTT:25.0 sec  Urinalysis Basic - ( 2019 14:53 )    Color: Yellow / Appearance: Clear / S.030 / pH: x  Gluc: x / Ketone: Moderate  / Bili: Negative / Urobili: 2 mg/dL   Blood: x / Protein: 100 mg/dL / Nitrite: Negative   Leuk Esterase: Large / RBC: 4 /hpf / WBC 15 /hpf   Sq Epi: x / Non Sq Epi: 6 /hpf / Bacteria: Few

## 2019-01-07 DIAGNOSIS — E83.51 HYPOCALCEMIA: ICD-10-CM

## 2019-01-07 LAB
ALDOST SERPL-MCNC: <3 NG/DL — SIGNIFICANT CHANGE UP
ANION GAP SERPL CALC-SCNC: 13 MMOL/L — SIGNIFICANT CHANGE UP (ref 5–17)
BUN SERPL-MCNC: 20 MG/DL — SIGNIFICANT CHANGE UP (ref 7–23)
CALCIUM SERPL-MCNC: 7.7 MG/DL — LOW (ref 8.4–10.5)
CALCIUM SERPL-MCNC: 8.1 MG/DL — LOW (ref 8.4–10.5)
CHLORIDE SERPL-SCNC: 101 MMOL/L — SIGNIFICANT CHANGE UP (ref 96–108)
CO2 SERPL-SCNC: 21 MMOL/L — LOW (ref 22–31)
CREAT SERPL-MCNC: 0.98 MG/DL — SIGNIFICANT CHANGE UP (ref 0.5–1.3)
GLUCOSE BLDC GLUCOMTR-MCNC: 114 MG/DL — HIGH (ref 70–99)
GLUCOSE BLDC GLUCOMTR-MCNC: 163 MG/DL — HIGH (ref 70–99)
GLUCOSE BLDC GLUCOMTR-MCNC: 191 MG/DL — HIGH (ref 70–99)
GLUCOSE BLDC GLUCOMTR-MCNC: 295 MG/DL — HIGH (ref 70–99)
GLUCOSE SERPL-MCNC: 98 MG/DL — SIGNIFICANT CHANGE UP (ref 70–99)
MAGNESIUM 24H UR-MRATE: 42.1 MG/24H — HIGH (ref 14.6–24.2)
MAGNESIUM SERPL-MCNC: 1.5 MG/DL — LOW (ref 1.6–2.6)
PHOSPHATE SERPL-MCNC: 2.8 MG/DL — SIGNIFICANT CHANGE UP (ref 2.5–4.5)
POTASSIUM SERPL-MCNC: 4.1 MMOL/L — SIGNIFICANT CHANGE UP (ref 3.5–5.3)
POTASSIUM SERPL-SCNC: 4.1 MMOL/L — SIGNIFICANT CHANGE UP (ref 3.5–5.3)
PTH-INTACT FLD-MCNC: 99 PG/ML — HIGH (ref 15–65)
SODIUM SERPL-SCNC: 135 MMOL/L — SIGNIFICANT CHANGE UP (ref 135–145)
T3FREE SERPL-MCNC: 1.79 PG/ML — LOW (ref 1.8–4.6)
T4 AB SER-ACNC: 8.1 UG/DL — SIGNIFICANT CHANGE UP (ref 4.6–12)
TSH SERPL-MCNC: 0.63 UIU/ML — SIGNIFICANT CHANGE UP (ref 0.27–4.2)

## 2019-01-07 RX ORDER — MAGNESIUM SULFATE 500 MG/ML
1 VIAL (ML) INJECTION ONCE
Qty: 0 | Refills: 0 | Status: COMPLETED | OUTPATIENT
Start: 2019-01-07 | End: 2019-01-07

## 2019-01-07 RX ORDER — POTASSIUM CHLORIDE 20 MEQ
20 PACKET (EA) ORAL DAILY
Qty: 0 | Refills: 0 | Status: DISCONTINUED | OUTPATIENT
Start: 2019-01-07 | End: 2019-01-07

## 2019-01-07 RX ADMIN — Medication 1 TABLET(S): at 08:09

## 2019-01-07 RX ADMIN — Medication 1: at 18:29

## 2019-01-07 RX ADMIN — BUDESONIDE AND FORMOTEROL FUMARATE DIHYDRATE 2 PUFF(S): 160; 4.5 AEROSOL RESPIRATORY (INHALATION) at 17:17

## 2019-01-07 RX ADMIN — Medication 100 GRAM(S): at 10:12

## 2019-01-07 RX ADMIN — Medication 0.5 MILLIGRAM(S): at 21:23

## 2019-01-07 RX ADMIN — Medication 1 TABLET(S): at 17:17

## 2019-01-07 RX ADMIN — HEPARIN SODIUM 5000 UNIT(S): 5000 INJECTION INTRAVENOUS; SUBCUTANEOUS at 08:10

## 2019-01-07 RX ADMIN — Medication 3: at 13:12

## 2019-01-07 RX ADMIN — ATORVASTATIN CALCIUM 40 MILLIGRAM(S): 80 TABLET, FILM COATED ORAL at 21:23

## 2019-01-07 RX ADMIN — Medication 20 MILLIEQUIVALENT(S): at 10:24

## 2019-01-07 RX ADMIN — HEPARIN SODIUM 5000 UNIT(S): 5000 INJECTION INTRAVENOUS; SUBCUTANEOUS at 21:23

## 2019-01-07 RX ADMIN — MAGNESIUM OXIDE 400 MG ORAL TABLET 400 MILLIGRAM(S): 241.3 TABLET ORAL at 12:22

## 2019-01-07 RX ADMIN — Medication 1 TABLET(S): at 05:34

## 2019-01-07 RX ADMIN — BUDESONIDE AND FORMOTEROL FUMARATE DIHYDRATE 2 PUFF(S): 160; 4.5 AEROSOL RESPIRATORY (INHALATION) at 05:34

## 2019-01-07 RX ADMIN — Medication 200 MILLIGRAM(S): at 05:34

## 2019-01-07 RX ADMIN — MAGNESIUM OXIDE 400 MG ORAL TABLET 400 MILLIGRAM(S): 241.3 TABLET ORAL at 08:09

## 2019-01-07 RX ADMIN — PANTOPRAZOLE SODIUM 40 MILLIGRAM(S): 20 TABLET, DELAYED RELEASE ORAL at 05:34

## 2019-01-07 RX ADMIN — MAGNESIUM OXIDE 400 MG ORAL TABLET 400 MILLIGRAM(S): 241.3 TABLET ORAL at 17:17

## 2019-01-07 NOTE — CONSULT NOTE ADULT - SUBJECTIVE AND OBJECTIVE BOX
HPI:  74 yo F with pmhx   COPD(on home O2 and prednisone 5mg) , htn  , hld  and dm  2, presenting with dizziness x three days . patient states symptoms began all of a sudden with lightheadedness and dizziness "like the room is spinning" for three days . Patient states she has associated nv and blurred vision with her symptoms . Patient states recently started on prednisone for COPD. Patient unsure what brings on her symptoms but symptoms are intermittent.  . Patient denies fever, cough, cp, sob, abd pain, tingling, numbness, weakness, syncope, aphasia, dysuria and hematuria (04 Jan 2019 15:40)      Admit Diagnosis  Hypokalemia      ENDOCRINE HPI: 72 y/o with HTN, DM, Lipid, smoker per history, recently treated COPD with recurrent admissions with dizziness and fatigue noted with Hypocalcemia and hypokalemia.    Patient was in her [facility] till early December when she noted decreased PO intake and fatigue. She had HTN treated with HCTZ, Beta blockers and Calcium channel blockers, GERD treated with omeprazole for a long term.   No did not have abdominal pain, had some bouts of diarrhea small volume, no N/V.   Patient was first sent to ER 12/6- at that time K was 3.3, Ca 7.5. Patient was treated with IV Fluids felt better and D/C ed.  She again complained of fatigue a few days later, sent again to the ER- noted with K-3.1, Ca- 6.4 and mild metabolic acidosis. She was admitted, hydrated with electrolytes supplementation and felt better the same day. During this admission Mg was noted to be low, and she was D/Toni on supplementation for 5 days, HCTZ was stopped, Oscal started 2 times daily. Patient was also noted with COPD exacerbation, treated with high dose steroid taper. PTH sent paired with Ca. 6.4 was low- 26, no h/o neck surgery.  Patient was admitted again 1/4/19 with similar symptoms- lethargy anorexia and dizziness which started over a few days prior to admission. She had several bouts of diarrhea the night prior. She was noted with hypomagnesemia, hypokalemia, hypocalcemia. Patient was off diuretics at this time, just finished steroid taper, still on omeprazole. Again she had quick symptomatic response to fluids and electrolytes supplementation.  She was told in the past of low vitamin D and was treated with Ergocalciferol 50,000 IU monthly, which she stopped a while back.      PAST MEDICAL & SURGICAL HISTORY:  HLD (hyperlipidemia)  DM (diabetes mellitus)  HTN (hypertension)  No significant past surgical history      FAMILY HISTORY:  No pertinent family history in first degree relatives      Social History: smoker P/H, denies ETOH.    Outpatient Medications:    MEDICATIONS  (STANDING):  ALPRAZolam 0.5 milliGRAM(s) Oral at bedtime  atorvastatin 40 milliGRAM(s) Oral at bedtime  buDESOnide 160 MICROgram(s)/formoterol 4.5 MICROgram(s) Inhaler 2 Puff(s) Inhalation two times a day  calcium carbonate 1250 mG  + Vitamin D (OsCal 500 + D) 1 Tablet(s) Oral two times a day  dextrose 5%. 1000 milliLiter(s) (50 mL/Hr) IV Continuous <Continuous>  dextrose 50% Injectable 12.5 Gram(s) IV Push once  dextrose 50% Injectable 25 Gram(s) IV Push once  dextrose 50% Injectable 25 Gram(s) IV Push once  heparin  Injectable 5000 Unit(s) SubCutaneous every 12 hours  insulin lispro (HumaLOG) corrective regimen sliding scale   SubCutaneous three times a day before meals  insulin lispro (HumaLOG) corrective regimen sliding scale   SubCutaneous at bedtime  magnesium oxide 400 milliGRAM(s) Oral three times a day with meals  metoprolol succinate  milliGRAM(s) Oral daily  pantoprazole    Tablet 40 milliGRAM(s) Oral before breakfast  potassium chloride    Tablet ER 20 milliEquivalent(s) Oral daily    MEDICATIONS  (PRN):  dextrose 40% Gel 15 Gram(s) Oral once PRN Blood Glucose LESS THAN 70 milliGRAM(s)/deciliter  glucagon  Injectable 1 milliGRAM(s) IntraMuscular once PRN Glucose LESS THAN 70 milligrams/deciliter      Allergies    No Known Allergies    Intolerances        Review of Systems:  Constitutional: No fever, no chills. Lethargy dizziness, anorexia.  Eyes: No blurry vision  Neuro: No tremors  HEENT: No pain  Cardiovascular: No chest pain, palpitations. Was told of tachycardia.  Respiratory: mild SOB, no cough  GI: No nausea, vomiting, intermittent abdominal pain, episodic diarrhea which she had for a long time.  : No dysuria  Skin: no rash  Psych: no depression. Anxious treated with xanax.  Endocrine: no polyuria, polydipsia  Hem/lymph: no swelling  Osteoporosis: no fractures    ALL OTHER SYSTEMS REVIEWED AND NEGATIVE    PHYSICAL EXAM:  VITALS: T(C): 36.3 (01-07-19 @ 13:50)  T(F): 97.4 (01-07-19 @ 13:50), Max: 98.7 (01-07-19 @ 04:16)  HR: 84 (01-07-19 @ 13:50) (70 - 84)  BP: 124/68 (01-07-19 @ 13:50) (106/69 - 124/68)  RR:  (18 - 18)  SpO2:  (94% - 100%)  Wt(lb): -- 143, stable.  GENERAL: NAD, well-groomed, well-developed  EYES: No proptosis, no lid lag, anicteric  HEENT:  Atraumatic, Normocephalic, moist mucous membranes  THYROID: Normal size, no palpable nodules  RESPIRATORY: Clear to auscultation bilaterally; No rales, rhonchi, minimal wheezing  CARDIOVASCULAR: Regular rate and rhythm; No murmurs; no peripheral edema  GI: Soft, nontender, non distended, normal bowel sounds  SKIN: Dry, intact, No rashes or lesions  MUSCULOSKELETAL: Full range of motion, normal strength  NEURO: sensation intact, extraocular movements intact, no tremor  PSYCH: Alert and oriented x 3, normal affect, normal mood    POCT Blood Glucose.: 295 mg/dL (01-07-19 @ 12:55)  POCT Blood Glucose.: 114 mg/dL (01-07-19 @ 09:26)  POCT Blood Glucose.: 190 mg/dL (01-06-19 @ 21:46)  POCT Blood Glucose.: 182 mg/dL (01-06-19 @ 17:49)  POCT Blood Glucose.: 338 mg/dL (01-06-19 @ 13:18)  POCT Blood Glucose.: 190 mg/dL (01-06-19 @ 08:33)  POCT Blood Glucose.: 150 mg/dL (01-05-19 @ 22:02)  POCT Blood Glucose.: 127 mg/dL (01-05-19 @ 18:05)  POCT Blood Glucose.: 152 mg/dL (01-05-19 @ 12:57)  POCT Blood Glucose.: 203 mg/dL (01-05-19 @ 09:06)  POCT Blood Glucose.: 219 mg/dL (01-04-19 @ 20:26)                            13.1   7.9   )-----------( 223      ( 06 Jan 2019 06:35 )             40.2       01-07    135  |  101  |  20  ----------------------------<  98  4.1   |  21<L>  |  0.98    Ca    8.1<L>      07 Jan 2019 06:46  Phos  2.8     01-07  Mg     1.5     01-07        Thyroid Function Tests:  01-07 @ 08:22 TSH 0.63 FreeT4 -- T3 -- Anti TPO -- Anti Thyroglobulin Ab -- TSI --  12-13 @ 08:12 TSH 0.44 FreeT4 -- T3 -- Anti TPO -- Anti Thyroglobulin Ab -- TSI --      Hemoglobin A1C, Whole Blood: 7.7 % <H> [4.0 - 5.6] (12-13-18 @ 07:30)    Aldosterone, Serum: <3.0: Test repeated.  Values flagged as "Out of Range" require correlation with information  below to determine if clinically abnormal.  The expected values for Aldosterone are ng/dL  Patients Posture                                  Age            Range (ng/dL)  Upright                                              21-65                  <3.0-39.2  Supine                                               21-65                  <3.0-23.2  Upright - EDTA                                 21-65            <3.0-35.3  Supine - EDTA                                  21-65             <3.0-23.6 ng/dL (01.05.19 @ 09:20)    Potassium, Random Urine: 50: Reference Ranges have NOT been established for random urine analytes due  to variability in fluid intake and concentration. mmol/L (01.05.19 @ 06:30)    Intact PTH: 99: PTH METHOD: Roche  Guide for Interpretation of PTH and Calcium Results                           Calcium             PTH                           MG/DL               PG/ML  Normal                   8.4-10.5            15-65  Primary  Hyperparathyroidism      >10.5               >50  Non-PTH Hypercalcemia    >10.5               0-20  Hypoparathroidism        <8.4                0-20  Pseudohypoparathyroid    <8.4                >50  This is intended as a guide only. Factors such as sunlight exposure,  Vitamin D status and renal function should be evaluated along with  clinical presentation. pg/mL (01.07.19 @ 08:22)  Parathyroid Hormone Intact, Serum (12.11.18 @ 15:10)    Intact PTH: 26: PTH METHOD: Roche  Guide for Interpretation of PTH and Calcium Results                           Calcium             PTH                           MG/DL               PG/ML  Normal                   8.4-10.5            15-65  Primary  Hyperparathyroidism      >10.5               >50  Non-PTH Hypercalcemia    >10.5               0-20  Hypoparathroidism        <8.4                0-20  Pseudohypoparathyroid    <8.4                >50  This is intended as a guide only. Factors such as sunlight exposure,  Vitamin D status and renal function should be evaluated along with  clinical presentation. pg/mL            Radiology:     EXAM:  CT ANGIO CHEST (W)AW IC                            *** ADDENDUM 01/07/2019  ***    Addendum report    CT scan performed on 12/11/2018.    No adrenal nodules are noted.      *** END OF ADDENDUM 01/07/2019  ***

## 2019-01-07 NOTE — PROGRESS NOTE ADULT - ASSESSMENT
ASSESSMENT:    multifactorial hypoxic respiratory failure:    1) moderate obstructive lung disease - suspect a severe reduction in diffusing capacity given severe emphysema on chest CT  2) restrictive lung disease due to obesity and a very large hiatal hernia compressing the left lung     moderate pulmonary hypertension due to chronic hypoxemia    recurrent symptomatic electrolyte abnormalities - hypomagnesemia/hypophosphatemiz/hypokalemia    PLAN/RECOMMENDATIONS:    oxygen supplementation to keep saturation greater than 92% at rest and with exertion - measure oxygen saturation on the ear or forehead  agree with discontinuation of prednisone but do not believe it was related to the electrolyte issues  symbicort 160/4.5mcg 2 puffs 2 times daily  incentive spiroemtry  cardiac meds: toprol XL/lipitor  glucose control  supplementing K, Mg, P, Ca  GI/DVT prophylaxis - protonix/SQ heparin  evaluation of electrolyte abnormalities -> nephrology and endocrinology evaluations    Will follow with you. Plan of care discussed with the patient and her family at bedside.     Phong Iglesias MD, [facility], [place] - [phone]  Pulmonary Medicine

## 2019-01-07 NOTE — PROGRESS NOTE ADULT - SUBJECTIVE AND OBJECTIVE BOX
[facility] - [facility]     PROGRESS NOTE    CHIEF COMPLAINT: chronic hypoxic respiratory failure; COPD; emphysema; hiatal hernia; atelectasis; pulmonary hypertension; electrolyte abnormalities    INTERVAL HISTORY: low finger tip pulse oximetry readings with gel nail polish; found to have Mg and K in potassium; evaluation ongoing for recurrent hypophosphatemia, hypokalemia, hypocalcemia and hypomagnesemia; reports alteration in her taste over the last several weeks; no further dizziness or lightheadedness with the room "spinning"; improved appetite which still is not great; no sweats; sitting in the chair without shortness of breath on a nasal canula, cough, sputum production, chest congestion or wheeze; no fevers, chills or sweats; no chest pain/pressure or palpitations    REVIEW OF SYSTEMS:  Constitutional: As per interval history  HEENT: Within normal limits  CV: As per interval history  Resp: As per interval history  GI: Within normal limits   : Within normal limits  Musculoskeletal: Within normal limits  Skin: Within normal limits  Neurological: Within normal limits  Psychiatric: Within normal limits  Endocrine: As per interval history  Hematologic/Lymphatic: Within normal limits  Allergic/Immunologic: Within normal limits    MEDICATIONS:     Pulmonary "  buDESOnide 160 MICROgram(s)/formoterol 4.5 MICROgram(s) Inhaler 2 Puff(s) Inhalation two times a day      Anti-microbials:      Cardiovascular:  metoprolol succinate  milliGRAM(s) Oral daily      Other:  ALPRAZolam 0.5 milliGRAM(s) Oral at bedtime  atorvastatin 40 milliGRAM(s) Oral at bedtime  calcium carbonate 1250 mG  + Vitamin D (OsCal 500 + D) 1 Tablet(s) Oral two times a day  dextrose 40% Gel 15 Gram(s) Oral once PRN  dextrose 5%. 1000 milliLiter(s) IV Continuous <Continuous>  dextrose 50% Injectable 12.5 Gram(s) IV Push once  dextrose 50% Injectable 25 Gram(s) IV Push once  dextrose 50% Injectable 25 Gram(s) IV Push once  glucagon  Injectable 1 milliGRAM(s) IntraMuscular once PRN  heparin  Injectable 5000 Unit(s) SubCutaneous every 12 hours  insulin lispro (HumaLOG) corrective regimen sliding scale   SubCutaneous three times a day before meals  insulin lispro (HumaLOG) corrective regimen sliding scale   SubCutaneous at bedtime  magnesium oxide 400 milliGRAM(s) Oral three times a day with meals  pantoprazole    Tablet 40 milliGRAM(s) Oral before breakfast  potassium chloride    Tablet ER 20 milliEquivalent(s) Oral daily        OBJECTIVE:    I&O's Detail    2019 07:01  -  2019 07:00  --------------------------------------------------------  IN:    Oral Fluid: 720 mL  Total IN: 720 mL    OUT:    Voided: 300 mL  Total OUT: 300 mL    Total NET: 420 mL      2019 07:01  -  2019 15:19  --------------------------------------------------------  IN:    Oral Fluid: 600 mL  Total IN: 600 mL    OUT:  Total OUT: 0 mL    Total NET: 600 mL    POCT Blood Glucose.: 295 mg/dL (2019 12:55)  POCT Blood Glucose.: 114 mg/dL (2019 09:26)  POCT Blood Glucose.: 190 mg/dL (2019 21:46)  POCT Blood Glucose.: 182 mg/dL (2019 17:49)      PHYSICAL EXAM:       ICU Vital Signs Last 24 Hrs  T(C): 36.3 (2019 13:50), Max: 37.1 (2019 04:16)  T(F): 97.4 (2019 13:50), Max: 98.7 (2019 04:16)  HR: 84 (2019 13:50) (70 - 84)  BP: 124/68 (2019 13:50) (106/69 - 124/68)  BP(mean): --  ABP: --  ABP(mean): --  RR: 18 (2019 13:50) (18 - 18)  SpO2: 100% (2019 04:16) (94% - 100%) on 3lpm nasal canula     General: Awake. Alert. Cooperative. No distress. Appears stated age 	  HEENT:   Atraumatic. Normocephalic. Anicteric. Normal oral mucosa. PERRL. EOMI.  Neck: Supple. Trachea midline. Thyroid without enlargement/tenderness/nodules. No carotid bruit. No JVD.	  Cardiovascular: Regular rate and rhythm. S1 S2 normal. No murmurs, rubs or gallops.  Respiratory: Respirations unlabored. Bibasilar rales right > left. No curvature.  Abdomen: Soft. Non-tender. Non-distended. No organomegaly. No masses. Normal bowel sounds.  Extremities: Warm to touch. No clubbing or cyanosis. No pedal edema.  Pulses: 2+ peripheral pulses all extremities.	  Skin: Normal skin color. No rashes or lesions. No ecchymoses. No cyanosis. Warm to touch.  Lymph Nodes: Cervical, supraclavicular and axillary nodes normal  Neurological: Motor and sensory examination equal and normal. A and O x 3  Psychiatry: Appropriate mood and affect.      LABS:                        13.1   7.9   )-----------( 223      ( 2019 06:35 )             40.2         135  |  101  |  20  ----------------------------<  98  4.1   |  21<L>  |  0.98        135  |  100  |  12  ----------------------------<  156<H>  4.3   |  22  |  0.86    Ca      8.1<L>          Ca      8.0<L>          Phos    2.8         Phos    2.0           Mg       1.5         Mg       1.5     -    TPro  7.1  /  Alb  3.9  /  TBili  0.6  /  DBili  x   /  AST  12  /  ALT  18  /  AlkPhos  57  -04    PT/INR - ( 2019 12:50 )   PT: 14.8 sec;   INR: 1.28 ratio       PTT - ( 2019 12:50 )  PTT:25.0 sec    CARDIAC MARKERS ( 2019 12:51 )  x     / x     / 17 U/L / x     / x        < from: Transthoracic Echocardiogram (18 @ 15:46) >    Patient name: GOLDBERG, EDITH  YOB: 1945   Age: 73 (F)   MR#: [id number]  Study Date: 2018  [id number] Elvi Condon [facility]  Study quality: Technically good  Referring Physician: Luis Alberto Osborne MD  Blood Pressure: 99/60 mmHg  Height: 163 cm  Weight: 73 kg  BSA: 1.8 m2  ------------------------------------------------------------------------  PROCEDURE: Transthoracic echocardiogram with 2-D, M-Mode  and complete spectral and color flow Doppler. Patient was  injected with 10 cc's of aerosolized saline. Patient was  injected with 10 cc's of aerosolized saline.  INDICATION: Dyspnea, unspecified (R06.00)  ------------------------------------------------------------------------  Dimensions:    Normal Values:  LA:     2.4    2.0 - 4.0 cm  Ao:     2.7    2.0 - 3.8 cm  SEPTUM: 0.7    0.6 - 1.2 cm  PWT:    0.7    0.6 - 1.1 cm  LVIDd:  3.9    3.0 - 5.6 cm  LVIDs:  2.5    1.8 - 4.0 cm  Derived variables:  LVMI: 42 g/m2  RWT: 0.35  Fractional short: 36 %  EF (Teicholtz): 66 %  Doppler Peak Velocity (m/sec): AoV=1.8 PV=0.8  ------------------------------------------------------------------------  Observations:  Mitral Valve: Normal mitral valve. Minimal mitral  regurgitation.  Aortic Valve/Aorta: Normal trileaflet aortic valve. Peak  transaortic valve gradient equals 13 mm Hg, consistent with  mild aortic stenosis. Peak left ventricular outflow tract  gradient equals 6 mm Hg, mean gradient is equal to 4 mm Hg,  LVOT velocity time integral equals 29 cm.  Aortic Root: 2.7 cm.  LVOT diameter: 2 cm.  Left Atrium: Normal left atrium.  LA volume index = 17  cc/m2.  Left Ventricle: Normal left ventricular systolic function.  No segmental wall motion abnormalities. Normal left  ventricular internal dimensions and wall thicknesses.  Right Heart: Normal right atrium. Normal right ventricular  size and function. Normal tricuspid valve. Normal pulmonic  valve.  Pericardium/Pleura: Normal pericardium with no pericardial  effusion.  Hemodynamic: Estimated right atrial pressure is 8 mm Hg.  Estimated right ventricular systolic pressure equals 49 mm  Hg, assuming right atrial pressure equals 8 mm Hg,  consistent with mild pulmonary hypertension. Agitated  saline injection demonstrates no evidence of a patent  foramen ovale.  ------------------------------------------------------------------------  Conclusions:  1. Normal mitral valve. Minimal mitral regurgitation.  2. Normal trileaflet aortic valve.  3. Normal left ventricular internal dimensions and wall  thicknesses.  4. Normal left ventricular systolic function. No segmental  wall motion abnormalities.  5. Normal right ventricular size and function.  6. Estimated right ventricular systolic pressure equals 49  mm Hg, assuming right atrial pressure equals 8 mm Hg,  consistent with mild pulmonary hypertension.  7. Agitated saline injection demonstrates no evidence of a  patent foramen ovale.  *** No previous Echo exam.  ------------------------------------------------------------------------  Confirmed on  2018 - 18:22:37 by Vahe Dodge MD  ------------------------------------------------------------------------    < end of copied text >  ---------------------------------------------------------------------------------------------------------------  MICROBIOLOGY:     Urinalysis Basic - ( 2019 14:53 )    Color: Yellow / Appearance: Clear / S.030 / pH: x  Gluc: x / Ketone: Moderate  / Bili: Negative / Urobili: 2 mg/dL   Blood: x / Protein: 100 mg/dL / Nitrite: Negative   Leuk Esterase: Large / RBC: 4 /hpf / WBC 15 /hpf   Sq Epi: x / Non Sq Epi: 6 /hpf / Bacteria: Few    Culture - Urine (19 @ 18:26)    Specimen Source: .Urine Clean Catch (Midstream)    Culture Results:   Culture grew 3 or more types of organisms which indicate  collection contamination; consider recollection only if clinically  indicated.    RADIOLOGY:  [x] Chest radiographs reviewed and interpreted by me    < from: CT Head No Cont (19 @ 14:03) >    EXAM:  CT BRAIN                          PROCEDURE DATE:  2019      INTERPRETATION:  CLINICAL INFORMATION: Dizziness. Nausea.    TECHNIQUE: Noncontrast axial CT images were acquired through the head.   Two-dimensional sagittal and coronal reformats were generated.    COMPARISON STUDY: None    FINDINGS:   There is no CT evidence of acute territorial infarct, acute intracranial   hemorrhage, extra-axial collection, mass effect, midline shift or   hydrocephalus.     Ventricles and sulci are age-appropriate.   Moderate  white matter   microvascular ischemic-type changes with patchy hypodensities.    The visualized paranasal sinuses are clear. The mastoid air cells and   middle ear cavities are clear. Right lens removal status postcataract   surgery.    The soft tissues of the scalp are unremarkable.  The calvarium is intact.    IMPRESSION:     No CT evidence of acute territorial infarct, intracranial hemorrhage,   brain edema, or mass effect.    JUAN MANUEL HOLCOMB M.D., RADIOLOGY RESIDENT  This document has been electronically signed.  MABEL VEGA M.D., ATTENDING RADIOLOGIST  This document has been electronically signed. 2019  2:21PM      < end of copied text >  ---------------------------------------------------------------------------------------------------------------  < from: Xray Chest 2 Views PA/Lat (18 @ 12:23) >    EXAM:  XR CHEST PA LAT 2V                          PROCEDURE DATE:  2018      INTERPRETATION:  PA and lateral projection of the chest was obtained on   2018.    Indication: Cough.    Impression:    The heart is normal in size. The lungs are clear. A big hiatal hernia is   seen on the left. No change in the appearance of the chest when compared   to previous study done 2018.    WILBUR GOMEZ M.D., ATTENDING RADIOLOGIST  This document has been electronically signed. Dec 11 2018  1:12PM    < end of copied text >  ---------------------------------------------------------------------------------------------------------------    < from: CT Angio Chest w/ IV Cont (18 @ 12:58) >    EXAM:  CT ANGIO CHEST (W)AW IC                          PROCEDURE DATE:  2018      INTERPRETATION:  Clinical information: Hypoxia. Evaluate for pulmonary   embolus.    CT angiogram of the chest was obtained following administration of  intravenous contrast. Approximately 90 cc of Omnipaque 350 was   administered and 10 cc was discarded. Coronal, sagittal and MIP images   were submitted for review.    Few lymph nodes are present in the pretracheal space and the AP window.    Heart is normal in size. Calcification the coronary arteries is noted. No   pericardial effusion is noted. Pulmonary arteries are normal in caliber.   No filling defects are noted.    Very large hiatal hernia is noted.    No endobronchial lesions are noted. 0.4 cm solid nodule is noted in the   right middle lobe. Centrilobular emphysema is noted bilaterally. No   pleural effusions are noted.    Below the diaphragm, visualized portions of the abdomen are unremarkable.     Degenerative changes of the spine are noted.    Impression: No pulmonary embolus is noted.    Very large hiatal hernia.    KARINE MCDOWELL M.D., ATTENDING RADIOLOGIST  This document has been electronically signed. Dec 11 2018  1:12PM     < end of copied text >  ---------------------------------------------------------------------------------------------------------------  SPIROMETRY (2018)    FEV1 1.19 liters - 53% predicted  FVC 2.06 liters - 69% predicted  FEV1% 58    c/w moderate obstructive lung disease

## 2019-01-07 NOTE — PROGRESS NOTE ADULT - SUBJECTIVE AND OBJECTIVE BOX
CARDIOLOGY     PROGRESS  NOTE   ________________________________________________    CHIEF COMPLAINT:Patient is a 73y old  Female who presents with a chief complaint of dizzy (07 Jan 2019 07:45)  doing well.  	  REVIEW OF SYSTEMS:  CONSTITUTIONAL: No fever, weight loss, or fatigue  EYES: No eye pain, visual disturbances, or discharge  ENT:  No difficulty hearing, tinnitus, vertigo; No sinus or throat pain  NECK: No pain or stiffness  RESPIRATORY: No cough, wheezing, chills or hemoptysis; decrease  Shortness of Breath  CARDIOVASCULAR: No chest pain, palpitations, passing out, dizziness, or leg swelling  GASTROINTESTINAL: No abdominal or epigastric pain. No nausea, vomiting, or hematemesis; No diarrhea or constipation. No melena or hematochezia.  GENITOURINARY: No dysuria, frequency, hematuria, or incontinence  NEUROLOGICAL: No headaches, memory loss, loss of strength, numbness, or tremors  SKIN: No itching, burning, rashes, or lesions   LYMPH Nodes: No enlarged glands  ENDOCRINE: No heat or cold intolerance; No hair loss  MUSCULOSKELETAL: No joint pain or swelling; No muscle, back, or extremity pain  PSYCHIATRIC: No depression, anxiety, mood swings, or difficulty sleeping  HEME/LYMPH: No easy bruising, or bleeding gums  ALLERGY AND IMMUNOLOGIC: No hives or eczema	    [ ] All others negative	  [ ] Unable to obtain    PHYSICAL EXAM:  T(C): 37.1 (01-07-19 @ 04:16), Max: 37.7 (01-06-19 @ 13:00)  HR: 70 (01-07-19 @ 04:16) (70 - 90)  BP: 123/80 (01-07-19 @ 04:16) (106/69 - 123/80)  RR: 18 (01-07-19 @ 04:16) (18 - 18)  SpO2: 100% (01-07-19 @ 04:16) (93% - 100%)  Wt(kg): --  I&O's Summary    06 Jan 2019 07:01  -  07 Jan 2019 07:00  --------------------------------------------------------  IN: 720 mL / OUT: 300 mL / NET: 420 mL        Appearance: Normal	  HEENT:   Normal oral mucosa, PERRL, EOMI	  Lymphatic: No lymphadenopathy  Cardiovascular: Normal S1 S2, No JVD, + murmurs, No edema  Respiratory: Lungs clear to auscultation	  Psychiatry: A & O x 3, Mood & affect appropriate  Gastrointestinal:  Soft, Non-tender, + BS	  Skin: No rashes, No ecchymoses, No cyanosis	  Neurologic: Non-focal  Extremities: Normal range of motion, No clubbing, cyanosis or edema  Vascular: Peripheral pulses palpable 2+ bilaterally    MEDICATIONS  (STANDING):  ALPRAZolam 0.5 milliGRAM(s) Oral at bedtime  atorvastatin 40 milliGRAM(s) Oral at bedtime  buDESOnide 160 MICROgram(s)/formoterol 4.5 MICROgram(s) Inhaler 2 Puff(s) Inhalation two times a day  calcium carbonate 1250 mG  + Vitamin D (OsCal 500 + D) 1 Tablet(s) Oral two times a day  dextrose 5%. 1000 milliLiter(s) (50 mL/Hr) IV Continuous <Continuous>  dextrose 50% Injectable 12.5 Gram(s) IV Push once  dextrose 50% Injectable 25 Gram(s) IV Push once  dextrose 50% Injectable 25 Gram(s) IV Push once  heparin  Injectable 5000 Unit(s) SubCutaneous every 12 hours  insulin lispro (HumaLOG) corrective regimen sliding scale   SubCutaneous three times a day before meals  insulin lispro (HumaLOG) corrective regimen sliding scale   SubCutaneous at bedtime  magnesium oxide 400 milliGRAM(s) Oral three times a day with meals  metoprolol succinate  milliGRAM(s) Oral daily  pantoprazole    Tablet 40 milliGRAM(s) Oral before breakfast      TELEMETRY: 	    ECG:  	  RADIOLOGY:  OTHER: 	  	  LABS:	 	    CARDIAC MARKERS:                                13.1   7.9   )-----------( 223      ( 06 Jan 2019 06:35 )             40.2     01-07    135  |  101  |  20  ----------------------------<  98  4.1   |  21<L>  |  0.98    Ca    8.1<L>      07 Jan 2019 06:46  Phos  2.8     01-07  Mg     1.5     01-07      proBNP:   Lipid Profile:   HgA1c: Hemoglobin A1C, Whole Blood: 7.7 % (12-13 @ 07:30)    TSH: Thyroid Stimulating Hormone, Serum: 0.44 uIU/mL (12-13 @ 08:12)          Assessment and plan  ---------------------------  lytes are all wnl  continue o2  echo noted from last admission  ? need of stress test not now  awaiting urine result

## 2019-01-07 NOTE — PROGRESS NOTE ADULT - PROBLEM SELECTOR PLAN 1
Likely sec to hypomagnesemia   Aldosterone is low-Follow up Endocrine  Urine K is high, suggesting urinary loss, likely sec to hypomagnesemia

## 2019-01-07 NOTE — PROGRESS NOTE ADULT - SUBJECTIVE AND OBJECTIVE BOX
INTERVAL HPI/OVERNIGHT EVENTS:  Pt seen and examined at bedside.     Allergies/Intolerance: No Known Allergies      MEDICATIONS  (STANDING):  ALPRAZolam 0.5 milliGRAM(s) Oral at bedtime  atorvastatin 40 milliGRAM(s) Oral at bedtime  buDESOnide 160 MICROgram(s)/formoterol 4.5 MICROgram(s) Inhaler 2 Puff(s) Inhalation two times a day  calcium carbonate 1250 mG  + Vitamin D (OsCal 500 + D) 1 Tablet(s) Oral two times a day  dextrose 5%. 1000 milliLiter(s) (50 mL/Hr) IV Continuous <Continuous>  dextrose 50% Injectable 12.5 Gram(s) IV Push once  dextrose 50% Injectable 25 Gram(s) IV Push once  dextrose 50% Injectable 25 Gram(s) IV Push once  heparin  Injectable 5000 Unit(s) SubCutaneous every 12 hours  insulin lispro (HumaLOG) corrective regimen sliding scale   SubCutaneous three times a day before meals  insulin lispro (HumaLOG) corrective regimen sliding scale   SubCutaneous at bedtime  magnesium oxide 400 milliGRAM(s) Oral three times a day with meals  metoprolol succinate  milliGRAM(s) Oral daily  pantoprazole    Tablet 40 milliGRAM(s) Oral before breakfast  potassium acid phosphate/sodium acid phosphate tablet (K-PHOS No. 2) 1 Tablet(s) Oral four times a day with meals    MEDICATIONS  (PRN):  dextrose 40% Gel 15 Gram(s) Oral once PRN Blood Glucose LESS THAN 70 milliGRAM(s)/deciliter  glucagon  Injectable 1 milliGRAM(s) IntraMuscular once PRN Glucose LESS THAN 70 milligrams/deciliter        ROS: all systems reviewed and wnl      PHYSICAL EXAMINATION:  Vital Signs Last 24 Hrs  T(C): 37.1 (07 Jan 2019 04:16), Max: 37.7 (06 Jan 2019 13:00)  T(F): 98.7 (07 Jan 2019 04:16), Max: 99.8 (06 Jan 2019 13:00)  HR: 70 (07 Jan 2019 04:16) (70 - 90)  BP: 123/80 (07 Jan 2019 04:16) (106/69 - 123/80)  BP(mean): --  RR: 18 (07 Jan 2019 04:16) (18 - 18)  SpO2: 100% (07 Jan 2019 04:16) (93% - 100%)  CAPILLARY BLOOD GLUCOSE      POCT Blood Glucose.: 190 mg/dL (06 Jan 2019 21:46)  POCT Blood Glucose.: 182 mg/dL (06 Jan 2019 17:49)  POCT Blood Glucose.: 338 mg/dL (06 Jan 2019 13:18)  POCT Blood Glucose.: 190 mg/dL (06 Jan 2019 08:33)      01-06 @ 07:01  -  01-07 @ 07:00  --------------------------------------------------------  IN: 720 mL / OUT: 300 mL / NET: 420 mL        GENERAL:   NECK: supple, No JVD  CHEST/LUNG: clear to auscultation bilaterally; no rales, rhonchi, or wheezing b/l  HEART: normal S1, S2  ABDOMEN: BS+, soft, ND, NT   EXTREMITIES:  pulses palpable; no clubbing, cyanosis, or edema b/l LEs  SKIN: no rashes or lesions      LABS:                        13.1   7.9   )-----------( 223      ( 06 Jan 2019 06:35 )             40.2     01-07    135  |  101  |  20  ----------------------------<  98  4.1   |  21<L>  |  0.98    Ca    8.1<L>      07 Jan 2019 06:46  Phos  2.8     01-07  Mg     1.5     01-07 INTERVAL HPI/OVERNIGHT EVENTS:  Pt seen and examined at bedside.     Allergies/Intolerance: No Known Allergies      MEDICATIONS  (STANDING):  ALPRAZolam 0.5 milliGRAM(s) Oral at bedtime  atorvastatin 40 milliGRAM(s) Oral at bedtime  buDESOnide 160 MICROgram(s)/formoterol 4.5 MICROgram(s) Inhaler 2 Puff(s) Inhalation two times a day  calcium carbonate 1250 mG  + Vitamin D (OsCal 500 + D) 1 Tablet(s) Oral two times a day  dextrose 5%. 1000 milliLiter(s) (50 mL/Hr) IV Continuous <Continuous>  dextrose 50% Injectable 12.5 Gram(s) IV Push once  dextrose 50% Injectable 25 Gram(s) IV Push once  dextrose 50% Injectable 25 Gram(s) IV Push once  heparin  Injectable 5000 Unit(s) SubCutaneous every 12 hours  insulin lispro (HumaLOG) corrective regimen sliding scale   SubCutaneous three times a day before meals  insulin lispro (HumaLOG) corrective regimen sliding scale   SubCutaneous at bedtime  magnesium oxide 400 milliGRAM(s) Oral three times a day with meals  metoprolol succinate  milliGRAM(s) Oral daily  pantoprazole    Tablet 40 milliGRAM(s) Oral before breakfast  potassium acid phosphate/sodium acid phosphate tablet (K-PHOS No. 2) 1 Tablet(s) Oral four times a day with meals    MEDICATIONS  (PRN):  dextrose 40% Gel 15 Gram(s) Oral once PRN Blood Glucose LESS THAN 70 milliGRAM(s)/deciliter  glucagon  Injectable 1 milliGRAM(s) IntraMuscular once PRN Glucose LESS THAN 70 milligrams/deciliter        ROS: all systems reviewed and wnl      PHYSICAL EXAMINATION:  Vital Signs Last 24 Hrs  T(C): 37.1 (07 Jan 2019 04:16), Max: 37.7 (06 Jan 2019 13:00)  T(F): 98.7 (07 Jan 2019 04:16), Max: 99.8 (06 Jan 2019 13:00)  HR: 70 (07 Jan 2019 04:16) (70 - 90)  BP: 123/80 (07 Jan 2019 04:16) (106/69 - 123/80)  BP(mean): --  RR: 18 (07 Jan 2019 04:16) (18 - 18)  SpO2: 100% (07 Jan 2019 04:16) (93% - 100%)  CAPILLARY BLOOD GLUCOSE      POCT Blood Glucose.: 190 mg/dL (06 Jan 2019 21:46)  POCT Blood Glucose.: 182 mg/dL (06 Jan 2019 17:49)  POCT Blood Glucose.: 338 mg/dL (06 Jan 2019 13:18)  POCT Blood Glucose.: 190 mg/dL (06 Jan 2019 08:33)      01-06 @ 07:01  -  01-07 @ 07:00  --------------------------------------------------------  IN: 720 mL / OUT: 300 mL / NET: 420 mL        GENERAL: comfortable in bed, alert, NAD, no SOB or fevers. No CP, feels back to normal.    NECK: supple, No JVD  CHEST/LUNG: clear to auscultation bilaterally; no rales, rhonchi, or wheezing b/l  HEART: normal S1, S2  ABDOMEN: BS+, soft, ND, NT   EXTREMITIES:  pulses palpable; no clubbing, cyanosis, or edema b/l LEs  SKIN: no rashes or lesions      LABS:                        13.1   7.9   )-----------( 223      ( 06 Jan 2019 06:35 )             40.2     01-07    135  |  101  |  20  ----------------------------<  98  4.1   |  21<L>  |  0.98    Ca    8.1<L>      07 Jan 2019 06:46  Phos  2.8     01-07  Mg     1.5     01-07

## 2019-01-07 NOTE — CONSULT NOTE ADULT - ASSESSMENT
74 y/o with HTN, DM, Lipid, smoker per history, recently treated COPD with recurrent admissions with dizziness and fatigue noted with Hypocalcemia and hypokalemia.    Patient was in her [facility] till early December when she noted decreased PO intake and fatigue. She had HTN treated with HCTZ, Beta blockers and Calcium channel blockers, GERD treated with omeprazole for a long term.   No did not have abdominal pain, had some bouts of diarrhea small volume, no N/V.   Patient was first sent to ER 12/6- at that time K was 3.3, Ca 7.5. Patient was treated with IV Fluids felt better and D/C ed.  She again complained of fatigue a few days later, sent again to the ER- noted with K-3.1, Ca- 6.4 and mild metabolic acidosis. She was admitted, hydrated with electrolytes supplementation and felt better the same day. During this admission Mg was noted to be low, and she was D/Toni on supplementation for 5 days, HCTZ was stopped, Oscal started 2 times daily. Patient was also noted with COPD exacerbation, treated with high dose steroid taper. PTH sent paired with Ca. 6.4 was low- 26, no h/o neck surgery.  Patient was admitted again 1/4/19 with similar symptoms- lethargy anorexia and dizziness which started over a few days prior to admission. She had several bouts of diarrhea the night prior. She was noted with hypomagnesemia, hypokalemia, hypocalcemia. Patient was off diuretics at this time, just finished steroid taper, still on omeprazole. Again she had quick symptomatic response to fluids and electrolytes supplementation.  She was told in the past of low vitamin D and was treated with Ergocalciferol 50,000 IU monthly, which she stopped a while back.    W/U for hypokalemia:  Inappropriately high urine K.   Suppressed aldosterone level.  Normal TFT's.  No adrenal lesions on CT scan.  5HIAA for GI loses Pending.  Quick resolution of hypokalemia post Mg treatment.    Hypocalcemia W/U:  Normal renal function.  Low PTH prior to Mg correction, appropriate increase in PTH post Mg supplementation this admission.   Vitamin D (25 hydroxy and 1,25 hydroxy) sent.  Phosphorous levels high and low- 1.2 last admission 3.1 this admission. Normal Alk Phos.    Hypomagnesemia:  Patient had GI complaints and anorexia both admissions.  HCTZ used during first admission stopped.  Still on omeprazole.  DM fairly controlled HbA1c 7.7%, on metformin alone.  Mg supplementation d/toni 5 days post prior discharge.  Patient was on high dose steroids with taper.    The common abnormality of hypomagnesemia, quick response to treatment suggest it is the common denominator for all admissions. Hypomagnesemia will cause decrease PTH release- resulting in hypocalcemia, as was seen prior to treatment during the first admission, which would resolve after Mg supplementation- seen in the second admission. Hypomagnesemia will decrease potassium reabsorption in the loop of Halle, resulting in inappropriately high urinary K loses and hypokalemia. As aldosterone level is low, and there are no adrenal lesions on imaging I doubt adrenal cause.    Reason for Mg loss in this patient could be anorexia, diarrhea, anti-acids, and HCTZ which was stopped. She denies ETOH use.     Would therefore suggest:  Patient on diabetic renal diet- change to diabetic DASH, as renal has low K, Mg.  Continue MgO4 400 mg TID.  Await 24 hour urine for 5HIAA- but doubt  as urine K was high.  Await Vitamin D studies- continue Oscal for now.  Stop Pantoprazole asked patient to use Tums (calcium carbonate) if symptomatic at home.  Stop K supplementation- should be controlled with Mg treatment off diuretics.  Keep off steroids.  If patient's electrolytes are controlled with above would D/C on open ended Mg supplementation, for reevaluation as outpatient.

## 2019-01-07 NOTE — PROGRESS NOTE ADULT - ASSESSMENT
72 yo Female PMH COPD(on home O2 and prednisone 5mg), HTN, HLD, DM(II), presenting with dizziness x three days. Patient states symptoms began all of a sudden with lightheadedness and dizziness "like the room is spinning" for three days . Patient states she has associated nv and blurred vision with her symptoms . Patient states recently started on prednisone for COPD. Patient unsure what brings on her symptoms but symptoms are intermittent.     CV: Tele is NSR. Will stop tele. Continue Toprol  mg/day. BP at goal 136/80.     COPD: Change to Symbicort BID while in hospital, working well, no cough or SOB.     Renal: Creatinine normal .88. K better 4.1, Magnesium better 1.5. Will increase Mag-oxide 400 mg tid and KCL 20 meq daily. Follow up 24 hour urine collection as ordered for potasium, calcium and magnesium. Recent prednisone use can account for low K levels. Calcium 8.1, add Oscal bid. Endo consult called to eval electrolytes. Discussed with renal attending in detail.       Discharge home in AM if Renal agrees. 72 yo Female PMH COPD(on home O2 and prednisone 5mg), HTN, HLD, DM(II), presenting with dizziness x three days. Patient states symptoms began all of a sudden with lightheadedness and dizziness "like the room is spinning" for three days . Patient states she has associated nv and blurred vision with her symptoms . Patient states recently started on prednisone for COPD. Patient unsure what brings on her symptoms but symptoms are intermittent.     CV: Tele is NSR. Will stop tele. Continue Toprol  mg/day. BP at goal 136/80.     COPD: Change to Symbicort BID while in hospital, working well, no cough or SOB.     Renal: Creatinine normal .88. K better 4.1, Magnesium better 1.5. Will increase Mag-oxide 400 mg tid and KCL 20 meq daily. Follow up 24 hour urine collection as ordered for potasium, calcium and magnesium. Recent prednisone use can account for low K levels. Calcium 8.1, add Oscal bid. Endo consult called to eval electrolytes. Discussed with renal attending in detail.       Discharge home Tuesday if Renal agrees.

## 2019-01-07 NOTE — PROGRESS NOTE ADULT - SUBJECTIVE AND OBJECTIVE BOX
Dr. Ashby  Office [phone]  Cell [phone]  Shara FERNANDEZ  Cell [phone]      Patient is a 73y old  Female who presents with a chief complaint of dizzy (07 Jan 2019 17:01)      Patient seen and examined at bedside. No chest pain/sob    VITALS:  T(F): 97.4 (01-07-19 @ 13:50), Max: 98.7 (01-07-19 @ 04:16)  HR: 84 (01-07-19 @ 13:50)  BP: 124/68 (01-07-19 @ 13:50)  RR: 18 (01-07-19 @ 13:50)  SpO2: 100% (01-07-19 @ 04:16)  Wt(kg): --    01-06 @ 07:01  -  01-07 @ 07:00  --------------------------------------------------------  IN: 720 mL / OUT: 300 mL / NET: 420 mL    01-07 @ 07:01  -  01-07 @ 19:38  --------------------------------------------------------  IN: 720 mL / OUT: 0 mL / NET: 720 mL          PHYSICAL EXAM:  Constitutional: NAD  Neck: No JVD  Respiratory: CTAB, no wheezes, rales or rhonchi  Cardiovascular: S1, S2, RRR  Gastrointestinal: BS+, soft, NT/ND  Extremities: No peripheral edema    Hospital Medications:   MEDICATIONS  (STANDING):  ALPRAZolam 0.5 milliGRAM(s) Oral at bedtime  atorvastatin 40 milliGRAM(s) Oral at bedtime  buDESOnide 160 MICROgram(s)/formoterol 4.5 MICROgram(s) Inhaler 2 Puff(s) Inhalation two times a day  calcium carbonate 1250 mG  + Vitamin D (OsCal 500 + D) 1 Tablet(s) Oral two times a day  dextrose 5%. 1000 milliLiter(s) (50 mL/Hr) IV Continuous <Continuous>  dextrose 50% Injectable 12.5 Gram(s) IV Push once  dextrose 50% Injectable 25 Gram(s) IV Push once  dextrose 50% Injectable 25 Gram(s) IV Push once  heparin  Injectable 5000 Unit(s) SubCutaneous every 12 hours  insulin lispro (HumaLOG) corrective regimen sliding scale   SubCutaneous three times a day before meals  insulin lispro (HumaLOG) corrective regimen sliding scale   SubCutaneous at bedtime  magnesium oxide 400 milliGRAM(s) Oral three times a day with meals  metoprolol succinate  milliGRAM(s) Oral daily      LABS:  01-07    135  |  101  |  20  ----------------------------<  98  4.1   |  21<L>  |  0.98    Ca    8.1<L>      07 Jan 2019 06:46  Phos  2.8     01-07  Mg     1.5     01-07      Creatinine Trend: 0.98 <--, 0.86 <--, 0.88 <--, 0.82 <--, 0.83 <--    Phosphorus Level, Serum: 2.8 mg/dL (01-07 @ 06:46)    calcium7.7  intact pth99  parathyroid hormone intact, serum--                            13.1   7.9   )-----------( 223      ( 06 Jan 2019 06:35 )             40.2     Urine Studies:  Urinalysis - [01-04-19 @ 14:53]      Color Yellow / Appearance Clear / SG 1.030 / pH 6.0      Gluc Negative / Ketone Moderate  / Bili Negative / Urobili 2 mg/dL       Blood Trace / Protein 100 mg/dL / Leuk Est Large / Nitrite Negative      RBC 4 / WBC 15 / Hyaline 1 / Gran  / Sq Epi  / Non Sq Epi 6 / Bacteria Few    Urine Potassium 50      [01-05-19 @ 06:30]    PTH -- (Ca 7.7)      [01-07-19 @ 08:22]   99  PTH -- (Ca 6.4)      [12-11-18 @ 15:10]   26  HbA1c 7.7      [12-13-18 @ 07:30]  TSH 0.63      [01-07-19 @ 08:22]        RADIOLOGY & ADDITIONAL STUDIES:

## 2019-01-08 ENCOUNTER — TRANSCRIPTION ENCOUNTER (OUTPATIENT)
Age: 74
End: 2019-01-08

## 2019-01-08 VITALS
TEMPERATURE: 99 F | OXYGEN SATURATION: 100 % | DIASTOLIC BLOOD PRESSURE: 78 MMHG | HEART RATE: 97 BPM | RESPIRATION RATE: 18 BRPM | SYSTOLIC BLOOD PRESSURE: 124 MMHG

## 2019-01-08 DIAGNOSIS — E83.39 OTHER DISORDERS OF PHOSPHORUS METABOLISM: ICD-10-CM

## 2019-01-08 LAB
24R-OH-CALCIDIOL SERPL-MCNC: 17.6 NG/ML — LOW (ref 30–80)
ANION GAP SERPL CALC-SCNC: 10 MMOL/L — SIGNIFICANT CHANGE UP (ref 5–17)
BUN SERPL-MCNC: 19 MG/DL — SIGNIFICANT CHANGE UP (ref 7–23)
CALCIUM 24H UR-MRATE: SIGNIFICANT CHANGE UP (ref 50–150)
CALCIUM SERPL-MCNC: 8.3 MG/DL — LOW (ref 8.4–10.5)
CHLORIDE SERPL-SCNC: 103 MMOL/L — SIGNIFICANT CHANGE UP (ref 96–108)
CO2 SERPL-SCNC: 23 MMOL/L — SIGNIFICANT CHANGE UP (ref 22–31)
COLLECT DURATION TIME UR: 24 HR — SIGNIFICANT CHANGE UP
CREAT SERPL-MCNC: 0.93 MG/DL — SIGNIFICANT CHANGE UP (ref 0.5–1.3)
GLUCOSE BLDC GLUCOMTR-MCNC: 169 MG/DL — HIGH (ref 70–99)
GLUCOSE SERPL-MCNC: 147 MG/DL — HIGH (ref 70–99)
MAGNESIUM SERPL-MCNC: 1.9 MG/DL — SIGNIFICANT CHANGE UP (ref 1.6–2.6)
PHOSPHATE SERPL-MCNC: 2.3 MG/DL — LOW (ref 2.5–4.5)
POTASSIUM SERPL-MCNC: 4.5 MMOL/L — SIGNIFICANT CHANGE UP (ref 3.5–5.3)
POTASSIUM SERPL-SCNC: 4.5 MMOL/L — SIGNIFICANT CHANGE UP (ref 3.5–5.3)
SODIUM SERPL-SCNC: 136 MMOL/L — SIGNIFICANT CHANGE UP (ref 135–145)
TOTAL VOLUME - 24 HOUR: 502 ML — SIGNIFICANT CHANGE UP
URINE CREATININE CALCULATION: 0.6 G/24 H — LOW (ref 0.8–1.8)
VIT D25+D1,25 OH+D1,25 PNL SERPL-MCNC: 47.2 PG/ML — SIGNIFICANT CHANGE UP (ref 19.9–79.3)

## 2019-01-08 PROCEDURE — 80048 BASIC METABOLIC PNL TOTAL CA: CPT

## 2019-01-08 PROCEDURE — 84443 ASSAY THYROID STIM HORMONE: CPT

## 2019-01-08 PROCEDURE — 84133 ASSAY OF URINE POTASSIUM: CPT

## 2019-01-08 PROCEDURE — 82306 VITAMIN D 25 HYDROXY: CPT

## 2019-01-08 PROCEDURE — 96374 THER/PROPH/DIAG INJ IV PUSH: CPT

## 2019-01-08 PROCEDURE — 80053 COMPREHEN METABOLIC PANEL: CPT

## 2019-01-08 PROCEDURE — 97161 PT EVAL LOW COMPLEX 20 MIN: CPT

## 2019-01-08 PROCEDURE — 84100 ASSAY OF PHOSPHORUS: CPT

## 2019-01-08 PROCEDURE — 70450 CT HEAD/BRAIN W/O DYE: CPT

## 2019-01-08 PROCEDURE — 82550 ASSAY OF CK (CPK): CPT

## 2019-01-08 PROCEDURE — 85027 COMPLETE CBC AUTOMATED: CPT

## 2019-01-08 PROCEDURE — 96376 TX/PRO/DX INJ SAME DRUG ADON: CPT

## 2019-01-08 PROCEDURE — 83690 ASSAY OF LIPASE: CPT

## 2019-01-08 PROCEDURE — 82570 ASSAY OF URINE CREATININE: CPT

## 2019-01-08 PROCEDURE — 85014 HEMATOCRIT: CPT

## 2019-01-08 PROCEDURE — 82330 ASSAY OF CALCIUM: CPT

## 2019-01-08 PROCEDURE — 84132 ASSAY OF SERUM POTASSIUM: CPT

## 2019-01-08 PROCEDURE — 82088 ASSAY OF ALDOSTERONE: CPT

## 2019-01-08 PROCEDURE — 94640 AIRWAY INHALATION TREATMENT: CPT

## 2019-01-08 PROCEDURE — 87086 URINE CULTURE/COLONY COUNT: CPT

## 2019-01-08 PROCEDURE — 83605 ASSAY OF LACTIC ACID: CPT

## 2019-01-08 PROCEDURE — 85730 THROMBOPLASTIN TIME PARTIAL: CPT

## 2019-01-08 PROCEDURE — 84295 ASSAY OF SERUM SODIUM: CPT

## 2019-01-08 PROCEDURE — 93005 ELECTROCARDIOGRAM TRACING: CPT

## 2019-01-08 PROCEDURE — 82310 ASSAY OF CALCIUM: CPT

## 2019-01-08 PROCEDURE — 84484 ASSAY OF TROPONIN QUANT: CPT

## 2019-01-08 PROCEDURE — 83735 ASSAY OF MAGNESIUM: CPT

## 2019-01-08 PROCEDURE — 82435 ASSAY OF BLOOD CHLORIDE: CPT

## 2019-01-08 PROCEDURE — 82947 ASSAY GLUCOSE BLOOD QUANT: CPT

## 2019-01-08 PROCEDURE — 82340 ASSAY OF CALCIUM IN URINE: CPT

## 2019-01-08 PROCEDURE — 83835 ASSAY OF METANEPHRINES: CPT

## 2019-01-08 PROCEDURE — 81001 URINALYSIS AUTO W/SCOPE: CPT

## 2019-01-08 PROCEDURE — 84436 ASSAY OF TOTAL THYROXINE: CPT

## 2019-01-08 PROCEDURE — 96375 TX/PRO/DX INJ NEW DRUG ADDON: CPT

## 2019-01-08 PROCEDURE — 85610 PROTHROMBIN TIME: CPT

## 2019-01-08 PROCEDURE — 84481 FREE ASSAY (FT-3): CPT

## 2019-01-08 PROCEDURE — 83970 ASSAY OF PARATHORMONE: CPT

## 2019-01-08 PROCEDURE — 82962 GLUCOSE BLOOD TEST: CPT

## 2019-01-08 PROCEDURE — 99285 EMERGENCY DEPT VISIT HI MDM: CPT | Mod: 25

## 2019-01-08 PROCEDURE — 82652 VIT D 1 25-DIHYDROXY: CPT

## 2019-01-08 PROCEDURE — 82803 BLOOD GASES ANY COMBINATION: CPT

## 2019-01-08 RX ORDER — AMLODIPINE BESYLATE AND BENAZEPRIL HYDROCHLORIDE 10; 20 MG/1; MG/1
1 CAPSULE ORAL
Qty: 0 | Refills: 0 | COMMUNITY

## 2019-01-08 RX ORDER — MAGNESIUM OXIDE 400 MG ORAL TABLET 241.3 MG
1 TABLET ORAL
Qty: 0 | Refills: 0 | DISCHARGE
Start: 2019-01-08 | End: 2019-02-06

## 2019-01-08 RX ORDER — ALPRAZOLAM 0.25 MG
1 TABLET ORAL
Qty: 5 | Refills: 0
Start: 2019-01-08 | End: 2019-01-12

## 2019-01-08 RX ORDER — MAGNESIUM OXIDE 400 MG ORAL TABLET 241.3 MG
1 TABLET ORAL
Qty: 90 | Refills: 0
Start: 2019-01-08 | End: 2019-02-06

## 2019-01-08 RX ORDER — METFORMIN HYDROCHLORIDE 850 MG/1
4 TABLET ORAL
Qty: 0 | Refills: 0 | COMMUNITY

## 2019-01-08 RX ORDER — SODIUM,POTASSIUM PHOSPHATES 278-250MG
1 POWDER IN PACKET (EA) ORAL ONCE
Qty: 0 | Refills: 0 | Status: COMPLETED | OUTPATIENT
Start: 2019-01-08 | End: 2019-01-08

## 2019-01-08 RX ORDER — MAGNESIUM OXIDE 400 MG ORAL TABLET 241.3 MG
1 TABLET ORAL
Qty: 0 | Refills: 0 | COMMUNITY
Start: 2019-01-08

## 2019-01-08 RX ORDER — ERGOCALCIFEROL 1.25 MG/1
1 CAPSULE ORAL
Qty: 1 | Refills: 0
Start: 2019-01-08 | End: 2019-02-06

## 2019-01-08 RX ORDER — IBUPROFEN 200 MG
2 TABLET ORAL
Qty: 0 | Refills: 0 | COMMUNITY

## 2019-01-08 RX ORDER — ERGOCALCIFEROL 1.25 MG/1
1 CAPSULE ORAL
Qty: 0 | Refills: 0 | COMMUNITY

## 2019-01-08 RX ORDER — OMEPRAZOLE 10 MG/1
1 CAPSULE, DELAYED RELEASE ORAL
Qty: 0 | Refills: 0 | COMMUNITY

## 2019-01-08 RX ADMIN — MAGNESIUM OXIDE 400 MG ORAL TABLET 400 MILLIGRAM(S): 241.3 TABLET ORAL at 09:44

## 2019-01-08 RX ADMIN — HEPARIN SODIUM 5000 UNIT(S): 5000 INJECTION INTRAVENOUS; SUBCUTANEOUS at 09:44

## 2019-01-08 RX ADMIN — Medication 200 MILLIGRAM(S): at 05:31

## 2019-01-08 RX ADMIN — BUDESONIDE AND FORMOTEROL FUMARATE DIHYDRATE 2 PUFF(S): 160; 4.5 AEROSOL RESPIRATORY (INHALATION) at 05:32

## 2019-01-08 RX ADMIN — Medication 1: at 09:44

## 2019-01-08 RX ADMIN — Medication 1 TABLET(S): at 13:10

## 2019-01-08 RX ADMIN — Medication 1 TABLET(S): at 05:31

## 2019-01-08 RX ADMIN — MAGNESIUM OXIDE 400 MG ORAL TABLET 400 MILLIGRAM(S): 241.3 TABLET ORAL at 13:10

## 2019-01-08 NOTE — PROGRESS NOTE ADULT - SUBJECTIVE AND OBJECTIVE BOX
[facility]     PROGRESS NOTE    CHIEF COMPLAINT: chronic hypoxic respiratory failure; COPD; emphysema; hiatal hernia; atelectasis; pulmonary hypertension; electrolyte abnormalities    INTERVAL HISTORY: seen by Dr. Miller of endocrinology who feels that the patient's electrolye abnormalities can be explained on the basis of magnesium losses in the urine; no further dizziness or lightheadedness with the room "spinning"; improved appetite which still is not great; no sweats; sitting in the chair without shortness of breath on a nasal canula, cough, sputum production, chest congestion or wheeze; no fevers, chills or sweats; no chest pain/pressure or palpitations    REVIEW OF SYSTEMS:  Constitutional: As per interval history  HEENT: Within normal limits  CV: As per interval history  Resp: As per interval history  GI: Within normal limits   : Within normal limits  Musculoskeletal: Within normal limits  Skin: Within normal limits  Neurological: Within normal limits  Psychiatric: Within normal limits  Endocrine: electrolyte abnormalities  Hematologic/Lymphatic: Within normal limits  Allergic/Immunologic: Within normal limits    MEDICATIONS:     Pulmonary "  buDESOnide 160 MICROgram(s)/formoterol 4.5 MICROgram(s) Inhaler 2 Puff(s) Inhalation two times a day      Anti-microbials:      Cardiovascular:  metoprolol succinate  milliGRAM(s) Oral daily      Other:  ALPRAZolam 0.5 milliGRAM(s) Oral at bedtime  atorvastatin 40 milliGRAM(s) Oral at bedtime  calcium carbonate 1250 mG  + Vitamin D (OsCal 500 + D) 1 Tablet(s) Oral two times a day  dextrose 40% Gel 15 Gram(s) Oral once PRN  dextrose 5%. 1000 milliLiter(s) IV Continuous <Continuous>  dextrose 50% Injectable 12.5 Gram(s) IV Push once  dextrose 50% Injectable 25 Gram(s) IV Push once  dextrose 50% Injectable 25 Gram(s) IV Push once  glucagon  Injectable 1 milliGRAM(s) IntraMuscular once PRN  heparin  Injectable 5000 Unit(s) SubCutaneous every 12 hours  insulin lispro (HumaLOG) corrective regimen sliding scale   SubCutaneous three times a day before meals  insulin lispro (HumaLOG) corrective regimen sliding scale   SubCutaneous at bedtime  magnesium oxide 400 milliGRAM(s) Oral three times a day with meals        OBJECTIVE:    I&O's Detail    2019 07:01  -  2019 07:00  --------------------------------------------------------  IN:    Oral Fluid: 720 mL  Total IN: 720 mL    OUT:    Voided: 210 mL  Total OUT: 210 mL    Total NET: 510 mL      2019 07:01  -  2019 14:15  --------------------------------------------------------  IN:    Oral Fluid: 120 mL  Total IN: 120 mL    OUT:    Voided: 100 mL  Total OUT: 100 mL    Total NET: 20 mL    Daily Weight in k.4 (2019 04:46)    POCT Blood Glucose.: 169 mg/dL (2019 09:05)  POCT Blood Glucose.: 191 mg/dL (2019 21:25)  POCT Blood Glucose.: 163 mg/dL (2019 18:01)      PHYSICAL EXAM:       ICU Vital Signs Last 24 Hrs  T(C): 37.1 (2019 13:35), Max: 37.1 (2019 13:35)  T(F): 98.8 (2019 13:35), Max: 98.8 (2019 13:35)  HR: 97 (2019 13:35) (77 - 97)  BP: 124/78 (2019 13:35) (108/64 - 145/85)  BP(mean): --  ABP: --  ABP(mean): --  RR: 18 (2019 13:35) (18 - 18)  SpO2: 100% (2019 13:35) (91% - 100%) on 3lpm nasal canula     General: Awake. Alert. Cooperative. No distress. Appears stated age 	  HEENT:   Atraumatic. Normocephalic. Anicteric. Normal oral mucosa. PERRL. EOMI.  Neck: Supple. Trachea midline. Thyroid without enlargement/tenderness/nodules. No carotid bruit. No JVD.	  Cardiovascular: Regular rate and rhythm. S1 S2 normal. No murmurs, rubs or gallops.  Respiratory: Respirations unlabored. Bibasilar rales right > left. No curvature.  Abdomen: Soft. Non-tender. Non-distended. No organomegaly. No masses. Normal bowel sounds.  Extremities: Warm to touch. No clubbing or cyanosis. No pedal edema.  Pulses: 2+ peripheral pulses all extremities.	  Skin: Normal skin color. No rashes or lesions. No ecchymoses. No cyanosis. Warm to touch.  Lymph Nodes: Cervical, supraclavicular and axillary nodes normal  Neurological: Motor and sensory examination equal and normal. A and O x 3  Psychiatry: Appropriate mood and affect.      LABS:        136  |  103  |  19  ----------------------------<  147<H>  4.5   |  23  |  0.93        135  |  101  |  20  ----------------------------<  98  4.1   |  21<L>  |  0.98    Ca      8.3<L>          Ca      8.1<L>          Phos    2.3         Phos    2.8           Mg       1.9     -    Mg       1.5     -    TPro  7.1  /  Alb  3.9  /  TBili  0.6  /  DBili  x   /  AST  12  /  ALT  18  /  AlkPhos  57  -    PT/INR - ( 2019 12:50 )   PT: 14.8 sec;   INR: 1.28 ratio       PTT - ( 2019 12:50 )  PTT:25.0 sec    CARDIAC MARKERS ( 2019 12:51 )  x     / x     / 17 U/L / x     / x        < from: Transthoracic Echocardiogram (18 @ 15:46) >    Patient name: GOLDBERG, EDITH  YOB: 1945   Age: 73 (F)   MR#: [id number]  Study Date: 2018  Location: [address] [place], [place] [id number]: Elvi Condno RDCS  Study quality: Technically good  Referring Physician: Luis Alberto Osborne MD  Blood Pressure: 99/60 mmHg  Height: 163 cm  Weight: 73 kg  BSA: 1.8 m2  ------------------------------------------------------------------------  PROCEDURE: Transthoracic echocardiogram with 2-D, M-Mode  and complete spectral and color flow Doppler. Patient was  injected with 10 cc's of aerosolized saline. Patient was  injected with 10 cc's of aerosolized saline.  INDICATION: Dyspnea, unspecified (R06.00)  ------------------------------------------------------------------------  Dimensions:    Normal Values:  LA:     2.4    2.0 - 4.0 cm  Ao:     2.7    2.0 - 3.8 cm  SEPTUM: 0.7    0.6 - 1.2 cm  PWT:    0.7    0.6 - 1.1 cm  LVIDd:  3.9    3.0 - 5.6 cm  LVIDs:  2.5    1.8 - 4.0 cm  Derived variables:  LVMI: 42 g/m2  RWT: 0.35  Fractional short: 36 %  EF (Teicholtz): 66 %  Doppler Peak Velocity (m/sec): AoV=1.8 PV=0.8  ------------------------------------------------------------------------  Observations:  Mitral Valve: Normal mitral valve. Minimal mitral  regurgitation.  Aortic Valve/Aorta: Normal trileaflet aortic valve. Peak  transaortic valve gradient equals 13 mm Hg, consistent with  mild aortic stenosis. Peak left ventricular outflow tract  gradient equals 6 mm Hg, mean gradient is equal to 4 mm Hg,  LVOT velocity time integral equals 29 cm.  Aortic Root: 2.7 cm.  LVOT diameter: 2 cm.  Left Atrium: Normal left atrium.  LA volume index = 17  cc/m2.  Left Ventricle: Normal left ventricular systolic function.  No segmental wall motion abnormalities. Normal left  ventricular internal dimensions and wall thicknesses.  Right Heart: Normal right atrium. Normal right ventricular  size and function. Normal tricuspid valve. Normal pulmonic  valve.  Pericardium/Pleura: Normal pericardium with no pericardial  effusion.  Hemodynamic: Estimated right atrial pressure is 8 mm Hg.  Estimated right ventricular systolic pressure equals 49 mm  Hg, assuming right atrial pressure equals 8 mm Hg,  consistent with mild pulmonary hypertension. Agitated  saline injection demonstrates no evidence of a patent  foramen ovale.  ------------------------------------------------------------------------  Conclusions:  1. Normal mitral valve. Minimal mitral regurgitation.  2. Normal trileaflet aortic valve.  3. Normal left ventricular internal dimensions and wall  thicknesses.  4. Normal left ventricular systolic function. No segmental  wall motion abnormalities.  5. Normal right ventricular size and function.  6. Estimated right ventricular systolic pressure equals 49  mm Hg, assuming right atrial pressure equals 8 mm Hg,  consistent with mild pulmonary hypertension.  7. Agitated saline injection demonstrates no evidence of a  patent foramen ovale.  *** No previous Echo exam.  ------------------------------------------------------------------------  Confirmed on  2018 - 18:22:37 by Vahe Dodge MD  ------------------------------------------------------------------------    < end of copied text >  ---------------------------------------------------------------------------------------------------------------  MICROBIOLOGY:     Urinalysis Basic - ( 2019 14:53 )    Color: Yellow / Appearance: Clear / S.030 / pH: x  Gluc: x / Ketone: Moderate  / Bili: Negative / Urobili: 2 mg/dL   Blood: x / Protein: 100 mg/dL / Nitrite: Negative   Leuk Esterase: Large / RBC: 4 /hpf / WBC 15 /hpf   Sq Epi: x / Non Sq Epi: 6 /hpf / Bacteria: Few    Culture - Urine (19 @ 18:26)    Specimen Source: .Urine Clean Catch (Midstream)    Culture Results:   Culture grew 3 or more types of organisms which indicate  collection contamination; consider recollection only if clinically  indicated.    RADIOLOGY:  [x] Chest radiographs reviewed and interpreted by me    < from: CT Head No Cont (19 @ 14:03) >    EXAM:  CT BRAIN                          PROCEDURE DATE:  2019      INTERPRETATION:  CLINICAL INFORMATION: Dizziness. Nausea.    TECHNIQUE: Noncontrast axial CT images were acquired through the head.   Two-dimensional sagittal and coronal reformats were generated.    COMPARISON STUDY: None    FINDINGS:   There is no CT evidence of acute territorial infarct, acute intracranial   hemorrhage, extra-axial collection, mass effect, midline shift or   hydrocephalus.     Ventricles and sulci are age-appropriate.   Moderate  white matter   microvascular ischemic-type changes with patchy hypodensities.    The visualized paranasal sinuses are clear. The mastoid air cells and   middle ear cavities are clear. Right lens removal status postcataract   surgery.    The soft tissues of the scalp are unremarkable.  The calvarium is intact.    IMPRESSION:     No CT evidence of acute territorial infarct, intracranial hemorrhage,   brain edema, or mass effect.    JUAN MANUEL HOLCOMB M.D., RADIOLOGY RESIDENT  This document has been electronically signed.  MABEL VEGA M.D., ATTENDING RADIOLOGIST  This document has been electronically signed. 2019  2:21PM      < end of copied text >  ---------------------------------------------------------------------------------------------------------------  < from: Xray Chest 2 Views PA/Lat (18 @ 12:23) >    EXAM:  XR CHEST PA LAT 2V                          PROCEDURE DATE:  2018      INTERPRETATION:  PA and lateral projection of the chest was obtained on   2018.    Indication: Cough.    Impression:    The heart is normal in size. The lungs are clear. A big hiatal hernia is   seen on the left. No change in the appearance of the chest when compared   to previous study done 2018.    WILBUR GOMEZ M.D., ATTENDING RADIOLOGIST  This document has been electronically signed. Dec 11 2018  1:12PM    < end of copied text >  ---------------------------------------------------------------------------------------------------------------    < from: CT Angio Chest w/ IV Cont (18 @ 12:58) >    EXAM:  CT ANGIO CHEST (W)AW IC                          PROCEDURE DATE:  2018      INTERPRETATION:  Clinical information: Hypoxia. Evaluate for pulmonary   embolus.    CT angiogram of the chest was obtained following administration of  intravenous contrast. Approximately 90 cc of Omnipaque 350 was   administered and 10 cc was discarded. Coronal, sagittal and MIP images   were submitted for review.    Few lymph nodes are present in the pretracheal space and the AP window.    Heart is normal in size. Calcification the coronary arteries is noted. No   pericardial effusion is noted. Pulmonary arteries are normal in caliber.   No filling defects are noted.    Very large hiatal hernia is noted.    No endobronchial lesions are noted. 0.4 cm solid nodule is noted in the   right middle lobe. Centrilobular emphysema is noted bilaterally. No   pleural effusions are noted.    Below the diaphragm, visualized portions of the abdomen are unremarkable.     Degenerative changes of the spine are noted.    Impression: No pulmonary embolus is noted.    Very large hiatal hernia.    KARINE MCDOWELL M.D., ATTENDING RADIOLOGIST  This document has been electronically signed. Dec 11 2018  1:12PM     < end of copied text >  ---------------------------------------------------------------------------------------------------------------  SPIROMETRY (2018)    FEV1 1.19 liters - 53% predicted  FVC 2.06 liters - 69% predicted  FEV1% 58    c/w moderate obstructive lung disease

## 2019-01-08 NOTE — PROGRESS NOTE ADULT - PROVIDER SPECIALTY LIST ADULT
Cardiology
Hospitalist
Nephrology
Pulmonology
Nephrology

## 2019-01-08 NOTE — DISCHARGE NOTE ADULT - CARE PROVIDER_API CALL
Bharat Mock (MD), Cardiovascular Disease; Internal Medicine  [address] [place] [address]  Phone: [phone]  Fax: [phone] Bharat Mock), Cardiovascular Disease; Internal Medicine  [address] [place] [address]  Phone: [phone]  Fax: [phone]    Gilbert Miller), EndocrinologyMetabDiabetes; Internal Medicine  [address] [facility]  [address]  [place] [address]  Phone: [phone]  Fax: [phone]

## 2019-01-08 NOTE — PHYSICAL THERAPY INITIAL EVALUATION ADULT - ADDITIONAL COMMENTS
Pt states she lives with her  in a apt with +elevator. Pt states she is independent with all ADLs and ambulation. Pt does not use a AD for ambulation but owns a cane. Pt states her  is available to assist when needed. Pt drives and wears reading glasses.

## 2019-01-08 NOTE — DISCHARGE NOTE ADULT - ADDITIONAL INSTRUCTIONS
Follow up with your Primary Care Doctor/Cardiologist within 7 days for a repeat BMP.  A stress test was recommended by Dr. Singh. Follow up with your Primary Care Doctor/Cardiologist within 7 days for a repeat BMP.  A stress test was recommended by Dr. Singh.  Follow up with Dr. Miller within 1 week.

## 2019-01-08 NOTE — PROGRESS NOTE ADULT - SUBJECTIVE AND OBJECTIVE BOX
[facility]   MD VANDA KANG MD RUORU WONG, PA    TEL:  OFFICE: [phone]  DR CISNEROS CELL: [phone]  HALIE CEDEÑO CELL: [phone]  DR. CHIN CELL: [phone]    RENAL FOLLOW UP NOTE  --------------------------------------------------------------------------------  HPI:      Pt seen and examined at bedside.   Vitaly COUCH, chest pain     PAST HISTORY  --------------------------------------------------------------------------------  No significant changes to PMH, PSH, FHx, SHx, unless otherwise noted    ALLERGIES & MEDICATIONS  --------------------------------------------------------------------------------  Allergies    No Known Allergies    Intolerances      Standing Inpatient Medications  ALPRAZolam 0.5 milliGRAM(s) Oral at bedtime  atorvastatin 40 milliGRAM(s) Oral at bedtime  buDESOnide 160 MICROgram(s)/formoterol 4.5 MICROgram(s) Inhaler 2 Puff(s) Inhalation two times a day  calcium carbonate 1250 mG  + Vitamin D (OsCal 500 + D) 1 Tablet(s) Oral two times a day  dextrose 5%. 1000 milliLiter(s) IV Continuous <Continuous>  dextrose 50% Injectable 12.5 Gram(s) IV Push once  dextrose 50% Injectable 25 Gram(s) IV Push once  dextrose 50% Injectable 25 Gram(s) IV Push once  heparin  Injectable 5000 Unit(s) SubCutaneous every 12 hours  insulin lispro (HumaLOG) corrective regimen sliding scale   SubCutaneous three times a day before meals  insulin lispro (HumaLOG) corrective regimen sliding scale   SubCutaneous at bedtime  magnesium oxide 400 milliGRAM(s) Oral three times a day with meals  metoprolol succinate  milliGRAM(s) Oral daily  potassium acid phosphate/sodium acid phosphate tablet (K-PHOS No. 2) 1 Tablet(s) Oral once    PRN Inpatient Medications  dextrose 40% Gel 15 Gram(s) Oral once PRN  glucagon  Injectable 1 milliGRAM(s) IntraMuscular once PRN      REVIEW OF SYSTEMS  --------------------------------------------------------------------------------  General: no fever  CVS: no chest pain  RESP: no sob, no cough  ABD: no abdominal pain  : no dysuria,  MSK: no edema     VITALS/PHYSICAL EXAM  --------------------------------------------------------------------------------  T(C): 36.3 (01-08-19 @ 04:46), Max: 36.3 (01-07-19 @ 13:50)  HR: 91 (01-08-19 @ 10:02) (77 - 91)  BP: 145/85 (01-08-19 @ 10:02) (108/64 - 145/85)  RR: 18 (01-08-19 @ 04:46) (18 - 18)  SpO2: 91% (01-08-19 @ 10:02) (91% - 100%)  Wt(kg): --        01-07-19 @ 07:01  -  01-08-19 @ 07:00  --------------------------------------------------------  IN: 720 mL / OUT: 210 mL / NET: 510 mL    01-08-19 @ 07:01  -  01-08-19 @ 12:38  --------------------------------------------------------  IN: 120 mL / OUT: 100 mL / NET: 20 mL      Physical Exam:  	Gen: NAD  	HEENT: MMM  	Pulm: CTA B/L  	CV: S1S2  	Abd: Soft, +BS  	Ext: No LE edema B/L                      Neuro: Awake   	Skin: Warm and Dry     LABS/STUDIES  --------------------------------------------------------------------------------    136  |  103  |  19  ----------------------------<  147      [01-08-19 @ 06:03]  4.5   |  23  |  0.93        Ca     8.3     [01-08-19 @ 06:03]      Mg     1.9     [01-08-19 @ 06:03]      Phos  2.3     [01-08-19 @ 06:03]            Creatinine Trend:  SCr 0.93 [01-08 @ 06:03]  SCr 0.98 [01-07 @ 06:46]  SCr 0.86 [01-06 @ 06:33]  SCr 0.88 [01-05 @ 07:09]  SCr 0.82 [01-04 @ 20:36]    Urinalysis - [01-04-19 @ 14:53]      Color Yellow / Appearance Clear / SG 1.030 / pH 6.0      Gluc Negative / Ketone Moderate  / Bili Negative / Urobili 2 mg/dL       Blood Trace / Protein 100 mg/dL / Leuk Est Large / Nitrite Negative      RBC 4 / WBC 15 / Hyaline 1 / Gran  / Sq Epi  / Non Sq Epi 6 / Bacteria Few    Urine Potassium 50      [01-05-19 @ 06:30]    PTH -- (Ca 7.7)      [01-07-19 @ 08:22]   99  PTH -- (Ca 6.4)      [12-11-18 @ 15:10]   26  Vitamin D (25OH) 17.6      [01-07-19 @ 09:16]  HbA1c 7.7      [12-13-18 @ 07:30]  TSH 0.63      [01-07-19 @ 08:22]

## 2019-01-08 NOTE — PROGRESS NOTE ADULT - SUBJECTIVE AND OBJECTIVE BOX
CARDIOLOGY     PROGRESS  NOTE   ________________________________________________    CHIEF COMPLAINT:Patient is a 73y old  Female who presents with a chief complaint of dizzy (07 Jan 2019 19:38)  no complain.  	  REVIEW OF SYSTEMS:  CONSTITUTIONAL: No fever, weight loss, or fatigue  EYES: No eye pain, visual disturbances, or discharge  ENT:  No difficulty hearing, tinnitus, vertigo; No sinus or throat pain  NECK: No pain or stiffness  RESPIRATORY: No cough, wheezing, chills or hemoptysis; No Shortness of Breath  CARDIOVASCULAR: No chest pain, palpitations, passing out, dizziness, or leg swelling  GASTROINTESTINAL: No abdominal or epigastric pain. No nausea, vomiting, or hematemesis; No diarrhea or constipation. No melena or hematochezia.  GENITOURINARY: No dysuria, frequency, hematuria, or incontinence  NEUROLOGICAL: No headaches, memory loss, loss of strength, numbness, or tremors  SKIN: No itching, burning, rashes, or lesions   LYMPH Nodes: No enlarged glands  ENDOCRINE: No heat or cold intolerance; No hair loss  MUSCULOSKELETAL: No joint pain or swelling; No muscle, back, or extremity pain  PSYCHIATRIC: No depression, anxiety, mood swings, or difficulty sleeping  HEME/LYMPH: No easy bruising, or bleeding gums  ALLERGY AND IMMUNOLOGIC: No hives or eczema	    [ ] All others negative	  [ ] Unable to obtain    PHYSICAL EXAM:  T(C): 36.3 (01-08-19 @ 04:46), Max: 36.3 (01-07-19 @ 13:50)  HR: 82 (01-08-19 @ 04:46) (77 - 84)  BP: 120/74 (01-08-19 @ 04:46) (108/64 - 124/68)  RR: 18 (01-08-19 @ 04:46) (18 - 18)  SpO2: 92% (01-08-19 @ 04:46) (92% - 100%)  Wt(kg): --  I&O's Summary    06 Jan 2019 07:01  -  07 Jan 2019 07:00  --------------------------------------------------------  IN: 720 mL / OUT: 300 mL / NET: 420 mL    07 Jan 2019 07:01  -  08 Jan 2019 06:55  --------------------------------------------------------  IN: 720 mL / OUT: 210 mL / NET: 510 mL        Appearance: Normal	  HEENT:   Normal oral mucosa, PERRL, EOMI	  Lymphatic: No lymphadenopathy  Cardiovascular: Normal S1 S2, No JVD, + murmurs, No edema  Respiratory: decrease bs	  Psychiatry: A & O x 3, Mood & affect appropriate  Gastrointestinal:  Soft, Non-tender, + BS	  Skin: No rashes, No ecchymoses, No cyanosis	  Neurologic: Non-focal  Extremities: Normal range of motion, No clubbing, cyanosis or edema  Vascular: Peripheral pulses palpable 2+ bilaterally    MEDICATIONS  (STANDING):  ALPRAZolam 0.5 milliGRAM(s) Oral at bedtime  atorvastatin 40 milliGRAM(s) Oral at bedtime  buDESOnide 160 MICROgram(s)/formoterol 4.5 MICROgram(s) Inhaler 2 Puff(s) Inhalation two times a day  calcium carbonate 1250 mG  + Vitamin D (OsCal 500 + D) 1 Tablet(s) Oral two times a day  dextrose 5%. 1000 milliLiter(s) (50 mL/Hr) IV Continuous <Continuous>  dextrose 50% Injectable 12.5 Gram(s) IV Push once  dextrose 50% Injectable 25 Gram(s) IV Push once  dextrose 50% Injectable 25 Gram(s) IV Push once  heparin  Injectable 5000 Unit(s) SubCutaneous every 12 hours  insulin lispro (HumaLOG) corrective regimen sliding scale   SubCutaneous three times a day before meals  insulin lispro (HumaLOG) corrective regimen sliding scale   SubCutaneous at bedtime  magnesium oxide 400 milliGRAM(s) Oral three times a day with meals  metoprolol succinate  milliGRAM(s) Oral daily      TELEMETRY: 	    ECG:  	  RADIOLOGY:  OTHER: 	  	  LABS:	 	    CARDIAC MARKERS:            01-08    136  |  103  |  19  ----------------------------<  147<H>  4.5   |  23  |  0.93    Ca    8.3<L>      08 Jan 2019 06:03  Phos  2.3     01-08  Mg     1.9     01-08      proBNP:   Lipid Profile:   HgA1c: Hemoglobin A1C, Whole Blood: 7.7 % (12-13 @ 07:30)    TSH: Thyroid Stimulating Hormone, Serum: 0.63 uIU/mL (01-07 @ 08:22)  Thyroid Stimulating Hormone, Serum: 0.44 uIU/mL (12-13 @ 08:12)    Calcium, Urine 24 Hour (01.07.19 @ 22:45)    Interval Timed: 24 HR    Urine Calcium Calculation: See Note: Unable to calculate due to low result.    Urine Creatinine Calculation: 0.6 g/24 h    Total Volume - 24 Hour: 502 mL          Assessment and plan  ---------------------------  severe electrolytes imbalance  awaiting work up  r/o carcinoid syndrome  dvt prophylaxis  bp is well controlled  will need stress test when condition improved ? out pt  increase ambulation

## 2019-01-08 NOTE — PROGRESS NOTE ADULT - SUBJECTIVE AND OBJECTIVE BOX
INTERVAL HPI/OVERNIGHT EVENTS:  Pt seen and examined at bedside.     Allergies/Intolerance: No Known Allergies      MEDICATIONS  (STANDING):  ALPRAZolam 0.5 milliGRAM(s) Oral at bedtime  atorvastatin 40 milliGRAM(s) Oral at bedtime  buDESOnide 160 MICROgram(s)/formoterol 4.5 MICROgram(s) Inhaler 2 Puff(s) Inhalation two times a day  calcium carbonate 1250 mG  + Vitamin D (OsCal 500 + D) 1 Tablet(s) Oral two times a day  dextrose 5%. 1000 milliLiter(s) (50 mL/Hr) IV Continuous <Continuous>  dextrose 50% Injectable 12.5 Gram(s) IV Push once  dextrose 50% Injectable 25 Gram(s) IV Push once  dextrose 50% Injectable 25 Gram(s) IV Push once  heparin  Injectable 5000 Unit(s) SubCutaneous every 12 hours  insulin lispro (HumaLOG) corrective regimen sliding scale   SubCutaneous three times a day before meals  insulin lispro (HumaLOG) corrective regimen sliding scale   SubCutaneous at bedtime  magnesium oxide 400 milliGRAM(s) Oral three times a day with meals  metoprolol succinate  milliGRAM(s) Oral daily    MEDICATIONS  (PRN):  dextrose 40% Gel 15 Gram(s) Oral once PRN Blood Glucose LESS THAN 70 milliGRAM(s)/deciliter  glucagon  Injectable 1 milliGRAM(s) IntraMuscular once PRN Glucose LESS THAN 70 milligrams/deciliter        ROS: all systems reviewed and wnl      PHYSICAL EXAMINATION:  Vital Signs Last 24 Hrs  T(C): 36.3 (08 Jan 2019 04:46), Max: 36.3 (07 Jan 2019 13:50)  T(F): 97.4 (08 Jan 2019 04:46), Max: 97.4 (07 Jan 2019 13:50)  HR: 82 (08 Jan 2019 04:46) (77 - 84)  BP: 120/74 (08 Jan 2019 04:46) (108/64 - 124/68)  BP(mean): --  RR: 18 (08 Jan 2019 04:46) (18 - 18)  SpO2: 92% (08 Jan 2019 04:46) (92% - 100%)  CAPILLARY BLOOD GLUCOSE      POCT Blood Glucose.: 191 mg/dL (07 Jan 2019 21:25)  POCT Blood Glucose.: 163 mg/dL (07 Jan 2019 18:01)  POCT Blood Glucose.: 295 mg/dL (07 Jan 2019 12:55)  POCT Blood Glucose.: 114 mg/dL (07 Jan 2019 09:26)      01-07 @ 07:01  -  01-08 @ 07:00  --------------------------------------------------------  IN: 720 mL / OUT: 210 mL / NET: 510 mL        GENERAL:   NECK: supple, No JVD  CHEST/LUNG: clear to auscultation bilaterally; no rales, rhonchi, or wheezing b/l  HEART: normal S1, S2  ABDOMEN: BS+, soft, ND, NT   EXTREMITIES:  pulses palpable; no clubbing, cyanosis, or edema b/l LEs  SKIN: no rashes or lesions      LABS:    01-08    136  |  103  |  19  ----------------------------<  147<H>  4.5   |  23  |  0.93    Ca    8.3<L>      08 Jan 2019 06:03  Phos  2.3     01-08  Mg     1.9     01-08 INTERVAL HPI/OVERNIGHT EVENTS:  Pt seen and examined at bedside.     Allergies/Intolerance: No Known Allergies      MEDICATIONS  (STANDING):  ALPRAZolam 0.5 milliGRAM(s) Oral at bedtime  atorvastatin 40 milliGRAM(s) Oral at bedtime  buDESOnide 160 MICROgram(s)/formoterol 4.5 MICROgram(s) Inhaler 2 Puff(s) Inhalation two times a day  calcium carbonate 1250 mG  + Vitamin D (OsCal 500 + D) 1 Tablet(s) Oral two times a day  dextrose 5%. 1000 milliLiter(s) (50 mL/Hr) IV Continuous <Continuous>  dextrose 50% Injectable 12.5 Gram(s) IV Push once  dextrose 50% Injectable 25 Gram(s) IV Push once  dextrose 50% Injectable 25 Gram(s) IV Push once  heparin  Injectable 5000 Unit(s) SubCutaneous every 12 hours  insulin lispro (HumaLOG) corrective regimen sliding scale   SubCutaneous three times a day before meals  insulin lispro (HumaLOG) corrective regimen sliding scale   SubCutaneous at bedtime  magnesium oxide 400 milliGRAM(s) Oral three times a day with meals  metoprolol succinate  milliGRAM(s) Oral daily    MEDICATIONS  (PRN):  dextrose 40% Gel 15 Gram(s) Oral once PRN Blood Glucose LESS THAN 70 milliGRAM(s)/deciliter  glucagon  Injectable 1 milliGRAM(s) IntraMuscular once PRN Glucose LESS THAN 70 milligrams/deciliter        ROS: all systems reviewed and wnl      PHYSICAL EXAMINATION:  Vital Signs Last 24 Hrs  T(C): 36.3 (08 Jan 2019 04:46), Max: 36.3 (07 Jan 2019 13:50)  T(F): 97.4 (08 Jan 2019 04:46), Max: 97.4 (07 Jan 2019 13:50)  HR: 82 (08 Jan 2019 04:46) (77 - 84)  BP: 120/74 (08 Jan 2019 04:46) (108/64 - 124/68)  BP(mean): --  RR: 18 (08 Jan 2019 04:46) (18 - 18)  SpO2: 92% (08 Jan 2019 04:46) (92% - 100%)  CAPILLARY BLOOD GLUCOSE      POCT Blood Glucose.: 191 mg/dL (07 Jan 2019 21:25)  POCT Blood Glucose.: 163 mg/dL (07 Jan 2019 18:01)  POCT Blood Glucose.: 295 mg/dL (07 Jan 2019 12:55)  POCT Blood Glucose.: 114 mg/dL (07 Jan 2019 09:26)      01-07 @ 07:01  -  01-08 @ 07:00  --------------------------------------------------------  IN: 720 mL / OUT: 210 mL / NET: 510 mL        GENERAL: stable in bed, no new complaints  NECK: supple, No JVD  CHEST/LUNG: clear to auscultation bilaterally; no rales, rhonchi, or wheezing b/l  HEART: normal S1, S2  ABDOMEN: BS+, soft, ND, NT   EXTREMITIES:  pulses palpable; no clubbing, cyanosis, or edema b/l LEs  SKIN: no rashes or lesions      LABS:    01-08    136  |  103  |  19  ----------------------------<  147<H>  4.5   |  23  |  0.93    Ca    8.3<L>      08 Jan 2019 06:03  Phos  2.3     01-08  Mg     1.9     01-08

## 2019-01-08 NOTE — PROGRESS NOTE ADULT - PROBLEM SELECTOR PLAN 1
Likely sec to hypomagnesemia   Aldosterone is low-Follow up Endocrine  Urine K is high, suggesting urinary loss, likely sec to hypomagnesemia  Monitor serum Potassium

## 2019-01-08 NOTE — DISCHARGE NOTE ADULT - HOSPITAL COURSE
74 y/o female with PMH of COPD (on home O2 and recently started on prednisone), HTN, HLD, DM2, and vitamin D deficiency, recently admitted for hypokalemia, presenting with dizziness and lightheadedness x 3 days. She was found to have hypokalemia and hypomagnesemia.  She reports a poor appetite and occasional loose BM.  During encounter with a clinician, patient started shaking and developed tachycardia – she was given xanax which calmed her down.   Patient was admitted to telemetry and followed closely by Nephrology, and evaluated by Nephrology and Cardiology.  CTH was negative.  Troponin was intermediate x 1.  Initial serum magnesium < 0.6 and serum potassium 2.9 – supplemented orally and intravenously.  Per nephrologist, hypokalemia and elevated urine potassium is likely 2/2 hypomagnesemia.  She was on PPI which in long term can cause hypomagnesemia through GI loss – PPI has now been discontinued, patient can use TUMS if symptomatic at home.  Her 24-hrs urine collection is undercollection but urine magnesium is still very high.  Labs with low PTH prior to magnesium correction and appropriate increase in PTH post magnesium supplementation.  Her aldosterone levels, no adrenal lesions noted on 12/2018 imaging, TSH was normal – unlikely adrenal cause per nephrology.  The common abnormality of hypomagnesemia, quick response to treatment suggest it is the common denominator for all admissions.  Patient will remain on PO magnesium 400mg TID on discharge, and will follow up with her Primary Care Doctor within 1 week to repeat BMP.  Tums (calcium carbonate) if symptomatic at home.  On the day of discharge, serum potassium was 4.5 and serum magnesium was 1.9.  Patient is now off prednisone – no longer required per Pulmonary.  Patient was seen by PT – has no skilled needs.  Patient will follow up with Primary Care/Cardiology within 7 days for a repeat BMP and to arranged for a stress test. 72 y/o female with PMH of COPD (on home O2 and recently started on prednisone), HTN, HLD, DM2, and vitamin D deficiency, recently admitted for hypokalemia, presenting with dizziness and lightheadedness x 3 days. She was found to have hypokalemia and hypomagnesemia.  She reports a poor appetite and occasional loose BM.  During encounter with a clinician, patient started shaking and developed tachycardia – she was given xanax which calmed her down.   Patient was admitted to telemetry and followed closely by Nephrology, and evaluated by Nephrology and Cardiology.  CTH was negative.  Troponin was intermediate x 1.  Initial serum magnesium < 0.6 and serum potassium 2.9 – supplemented orally and intravenously.  Per nephrologist, hypokalemia and elevated urine potassium is likely 2/2 hypomagnesemia.  She was on PPI which in long term can cause hypomagnesemia through GI loss – PPI has now been discontinued, patient can use TUMS if symptomatic at home.  Her 24-hrs urine collection is undercollection but urine magnesium is still very high.  Labs with low PTH prior to magnesium correction and appropriate increase in PTH post magnesium supplementation.  Her aldosterone levels, no adrenal lesions noted on 12/2018 imaging, TSH was normal – unlikely adrenal cause per nephrology.  The common abnormality of hypomagnesemia, quick response to treatment suggest it is the common denominator for all admissions.  Patient will remain on PO magnesium 400mg TID on discharge, and will follow up with her Primary Care Doctor within 1 week to repeat BMP.  Tums (calcium carbonate) if symptomatic at home.  BP is well controlled with Toprol XL - amlodipine-benazepril was discontinued  On the day of discharge, serum potassium was 4.5 and serum magnesium was 1.9.  Patient is now off prednisone – no longer required per Pulmonary.  Patient was seen by PT – has no skilled needs.  Patient will follow up with Primary Care/Cardiology within 7 days for a repeat BMP and to arranged for a stress test.  Patient will also follow up with Endocrinology within 1 week. 72 y/o female with PMH of recently diagnosed COPD (on home O2 and recently started on prednisone taper), HTN, HLD, DM2, and vitamin D deficiency, recently admitted for hypokalemia, presenting with dizziness and lightheadedness x 3 days. She was found to have hypokalemia and hypomagnesemia on arrival.  She reports a poor appetite and occasional loose BM.  During encounter with a clinician, patient started shaking and developed tachycardia. Was given xanax which calmed her down.       Patient was admitted to telemetry and followed closely by Nephrology and Cardiology.  CT head was negative.  Troponin was intermediate x 1.  Initial serum magnesium < 0.6 and serum potassium 2.9. Was supplemented orally and intravenously.      Per nephrologist, hypokalemia and elevated urine potassium is likely 2/2 hypomagnesemia.  She was on PPI which in long term can cause hypomagnesemia through GI loss. PPI has now been discontinued. Patient can use TUMS if symptomatic at home.  24-hrs urine collection is undercollection but urine magnesium is still very high at 42 (normals are 15-25).  Aldosterone level was normal. TSH was normal.     The common abnormality of hypomagnesemia, quick response to treatment suggest it is the common denominator for all admissions.  Patient will remain on PO magnesium 400mg TID on long term basis. Will follow up with her Primary Care Doctor within 4-5 days to repeat BMP.  Tums (calcium carbonate) if symptomatic at home.  BP is well controlled with Toprol  mg/day. On the day of discharge, serum potassium was 4.5 and serum magnesium was 1.9.  Patient is now off prednisone and is no longer needed. PT noted she has no skilled needs.  Patient will follow up with Endocrinology within 1 week. See attached med list. 74 y/o female with PMH of recently diagnosed COPD (on home O2 and recently started on prednisone taper), HTN, HLD, DM2, and vitamin D deficiency, recently admitted for hypokalemia, presenting with dizziness and lightheadedness x 3 days. She was found to have hypokalemia and hypomagnesemia on arrival.  She reports a poor appetite and occasional loose BM.  During encounter with a clinician, patient started shaking and developed tachycardia. Was given xanax which calmed her down.       Patient was admitted to telemetry and followed closely by Nephrology and Cardiology.  CT head was negative.  Troponin was intermediate x 1.  Initial serum magnesium < 0.6 and serum potassium 2.9. Was supplemented orally and intravenously.      Per nephrologist, hypokalemia is likely 2/2 hypomagnesemia.  She was on PPI which in long term can cause hypomagnesemia through GI loss. PPI has now been discontinued. Patient can use TUMS if symptomatic at home.  24-hrs urine collection is undercollection but urine magnesium is still very high at 42 (normals are 15-25).  Aldosterone level was normal. TSH was normal.     The common abnormality of hypomagnesemia, quick response to treatment suggest it is the common denominator for all admissions.  Has aquired a renal magnesium wasting syndrome. Patient will remain on PO magnesium 400mg TID on long term basis. Will follow up with her Primary Care Doctor within 4-5 days to repeat BMP.  Tums (calcium carbonate) if symptomatic at home.  BP is well controlled with Toprol  mg/day. COPD entirely stable on 2 inhalers at home with oxygen as needed.     On the day of discharge, serum potassium was 4.5 and serum magnesium was 1.9.  Patient is now off prednisone and is no longer needed. PT noted she has no skilled needs.  Patient will follow up with Endocrinology within 1 week. See attached med list.

## 2019-01-08 NOTE — DISCHARGE NOTE ADULT - PLAN OF CARE
Renal magnesium wasting syndrome will be managed with supplements Take all medications as prescribed.    Follow up with your Primary Care Doctor for repeat blood work (BMP) within 1 week. Potassium helps with the function of your nerve and muscle cells, including those in the heart.  A low level of potassium in the blood can cause abnormal heart rhythms and even heart attack.  Eat adequate amounts of potassium rich foods, but do not overconsume.  Foods that are rich in potassium include bananas, oranges, orange juice, tomatoes, tomato sauce, tomato juice, green leafy vegetables, melons, peas, beans, potatoes, sweet potatoes, avocados, vegetable juices, fruit juices, nuts, and seeds.  After strenuous exercise or any activity that causes you to sweat a lot, drink Gatorade or other sports drinks that contain potassium.  Avoid foods that are high in salt.  Avoid canned and prepared foods that are high in salt. Avoid environmental allergens and asthma triggers.  Participate in physical activity as tolerated.  Seek immediate medical attention if the shortness of breath does not improve with asthma treatments, or if you experience chest pain or palpitations.  Call an ambulance if your lips or nail beds become a bluish color. Make sure you get your HgA1c checked every three months.  If you take oral diabetes medications, check your blood glucose at least two times a day.  If you take short-acting insulin, check your blood glucose before meals and at bedtime.  It's important not to skip any meals.  Keep a log of your blood glucose results and always take it with you to your doctor appointments.  Keep a list of your current medications including over the counter medications and bring this medication list with you to all your doctor appointments.  If you have not seen your ophthalmologist this year, call for appointment.  Check your feet daily for redness, sores, or openings.  Do not self treat.  If there is no improvement in two days, call your primary care physician for an appointment.    HgA1c this admission was 7.7.  Patient will follow up with her Primary Care Doctor within 1 week. Continue to follow a low salt/sodium diet.  Perform physical activities as tolerated in consultation with your Primary Care Provider and physical therapist.  Take all medications as prescribed.  Follow up with your medical doctor for routine blood pressure monitoring at your next visit.  Notify your doctor if you have any of the following symptoms:  Dizziness, lightheadedness, blurry vision, headache, chest pain, or shortness of breath.

## 2019-01-08 NOTE — DISCHARGE NOTE ADULT - PATIENT PORTAL LINK FT
You can access the [facility] Patient Portal, offered by [facility], by registering with the following website: [email or web address]

## 2019-01-08 NOTE — DISCHARGE NOTE ADULT - CARE PLAN
Principal Discharge DX:	Hypomagnesemia  Goal:	Renal magnesium wasting syndrome will be managed with supplements  Assessment and plan of treatment:	Take all medications as prescribed.    Follow up with your Primary Care Doctor for repeat blood work (BMP) within 1 week.  Secondary Diagnosis:	Hypokalemia  Assessment and plan of treatment:	Potassium helps with the function of your nerve and muscle cells, including those in the heart.  A low level of potassium in the blood can cause abnormal heart rhythms and even heart attack.  Eat adequate amounts of potassium rich foods, but do not overconsume.  Foods that are rich in potassium include bananas, oranges, orange juice, tomatoes, tomato sauce, tomato juice, green leafy vegetables, melons, peas, beans, potatoes, sweet potatoes, avocados, vegetable juices, fruit juices, nuts, and seeds.  After strenuous exercise or any activity that causes you to sweat a lot, drink Gatorade or other sports drinks that contain potassium.  Avoid foods that are high in salt.  Avoid canned and prepared foods that are high in salt.  Secondary Diagnosis:	COPD (chronic obstructive pulmonary disease)  Assessment and plan of treatment:	Avoid environmental allergens and asthma triggers.  Participate in physical activity as tolerated.  Seek immediate medical attention if the shortness of breath does not improve with asthma treatments, or if you experience chest pain or palpitations.  Call an ambulance if your lips or nail beds become a bluish color.  Secondary Diagnosis:	DM (diabetes mellitus)  Assessment and plan of treatment:	Make sure you get your HgA1c checked every three months.  If you take oral diabetes medications, check your blood glucose at least two times a day.  If you take short-acting insulin, check your blood glucose before meals and at bedtime.  It's important not to skip any meals.  Keep a log of your blood glucose results and always take it with you to your doctor appointments.  Keep a list of your current medications including over the counter medications and bring this medication list with you to all your doctor appointments.  If you have not seen your ophthalmologist this year, call for appointment.  Check your feet daily for redness, sores, or openings.  Do not self treat.  If there is no improvement in two days, call your primary care physician for an appointment.    HgA1c this admission was 7.7.  Patient will follow up with her Primary Care Doctor within 1 week.  Secondary Diagnosis:	HTN (hypertension)  Assessment and plan of treatment:	Continue to follow a low salt/sodium diet.  Perform physical activities as tolerated in consultation with your Primary Care Provider and physical therapist.  Take all medications as prescribed.  Follow up with your medical doctor for routine blood pressure monitoring at your next visit.  Notify your doctor if you have any of the following symptoms:  Dizziness, lightheadedness, blurry vision, headache, chest pain, or shortness of breath.

## 2019-01-08 NOTE — PHYSICAL THERAPY INITIAL EVALUATION ADULT - PERTINENT HX OF CURRENT PROBLEM, REHAB EVAL
Pt is a 72 yo F with pmhx COPD (on home O2 and prednisone 5mg) , HTN, hld and DM2, presenting with dizziness x three days. She was found to have hypokalemia, hypomagnesemia, she was recently also admitted for hypokalemia. (-) Head CT 1/4/19.

## 2019-01-08 NOTE — PHYSICAL THERAPY INITIAL EVALUATION ADULT - STRENGTHENING, PT EVAL
GOAL: Patient will improve bilateral UE/LE strength to 5/5, for increased limb stability, to improve gait in 2 weeks.

## 2019-01-08 NOTE — DISCHARGE NOTE ADULT - MEDICATION SUMMARY - MEDICATIONS TO STOP TAKING
I will STOP taking the medications listed below when I get home from the hospital:    amlodipine-benazepril 5 mg-40 mg oral capsule  -- 1 cap(s) by mouth once a day    omeprazole 20 mg oral delayed release capsule  -- 1 cap(s) by mouth once a day    Advil 200 mg oral tablet  -- 2 tab(s) by mouth , As Needed    predniSONE 10 mg oral tablet  -- 5 tab daily x 3 days, 4 tab daily x 3 days,3 tab daily x 3 days, 2 tab daily x 3 days, 1 tab daily x 3 days    cefuroxime 500 mg oral tablet  -- 1 tab(s) by mouth every 12 hours x 5 days I will STOP taking the medications listed below when I get home from the hospital:    amlodipine-benazepril 5 mg-40 mg oral capsule  -- 1 cap(s) by mouth once a day    omeprazole 20 mg oral delayed release capsule  -- 1 cap(s) by mouth once a day    Advil 200 mg oral tablet  -- 2 tab(s) by mouth , As Needed    predniSONE 10 mg oral tablet  -- 5 tab daily x 3 days, 4 tab daily x 3 days,3 tab daily x 3 days, 2 tab daily x 3 days, 1 tab daily x 3 days

## 2019-01-08 NOTE — DISCHARGE NOTE ADULT - MEDICATION SUMMARY - MEDICATIONS TO TAKE
I will START or STAY ON the medications listed below when I get home from the hospital:    metFORMIN 500 mg oral tablet, extended release  -- 4 tab(s) by mouth once a day  -- Indication: For DM (diabetes mellitus)    atorvastatin 40 mg oral tablet  -- 1 tab(s) by mouth once a day  -- Indication: For Hyperlipidemia    ALPRAZolam 0.5 mg oral tablet  -- 1 tab(s) by mouth once a day (at bedtime) MDD:1 tablet  -- Indication: For Anxiety    Metoprolol Succinate  mg oral tablet, extended release  -- 1 tab(s) by mouth once a day  -- Indication: For HTN (hypertension)    Breo Ellipta 200 mcg-25 mcg/inh inhalation powder  -- 1 puff(s) inhaled once a day  -- Indication: For COPD (chronic obstructive pulmonary disease)    Incruse Ellipta 62.5 mcg/inh inhalation powder  -- 62.5 microgram(s) inhaled once a day  -- Indication: For COPD (chronic obstructive pulmonary disease)    magnesium oxide 400 mg (241.3 mg elemental magnesium) oral tablet  -- 1 tab(s) by mouth 3 times a day (with meals)  -- Indication: For Hypomagnesemia    calcium-vitamin D 500 mg-200 intl units oral tablet  -- 1 tab(s) by mouth 2 times a day  -- Indication: For Hypocalcemia    ergocalciferol 50,000 intl units (1.25 mg) oral capsule  -- 1 cap(s) by mouth once a month  -- Indication: For Supplement I will START or STAY ON the medications listed below when I get home from the hospital:    metFORMIN 500 mg oral tablet, extended release  -- 4 tab(s) by mouth once a day  -- Indication: For DM (diabetes mellitus)    atorvastatin 40 mg oral tablet  -- 1 tab(s) by mouth once a day  -- Indication: For Hyperlipidemia    ALPRAZolam 0.5 mg oral tablet  -- 1 tab(s) by mouth once a day (at bedtime) MDD:1 tablet  -- Indication: For Xanax    Metoprolol Succinate  mg oral tablet, extended release  -- 1 tab(s) by mouth once a day  -- Indication: For HTN (hypertension)    Breo Ellipta 200 mcg-25 mcg/inh inhalation powder  -- 1 puff(s) inhaled once a day  -- Indication: For COPD (chronic obstructive pulmonary disease)    Incruse Ellipta 62.5 mcg/inh inhalation powder  -- 62.5 microgram(s) inhaled once a day  -- Indication: For COPD (chronic obstructive pulmonary disease)    magnesium oxide 400 mg (241.3 mg elemental magnesium) oral tablet  -- 1 tab(s) by mouth 3 times a day (with meals)  -- Indication: For Hypomagnesemia    calcium-vitamin D 500 mg-200 intl units oral tablet  -- 1 tab(s) by mouth 2 times a day  -- Indication: For Hypocalcemia    ergocalciferol 50,000 intl units (1.25 mg) oral capsule  -- 1 cap(s) by mouth once a month  -- Indication: For Hypocalcemia

## 2019-01-08 NOTE — PROGRESS NOTE ADULT - ASSESSMENT
ASSESSMENT:    multifactorial hypoxic respiratory failure:    1) moderate obstructive lung disease - suspect a severe reduction in diffusing capacity given severe emphysema on chest CT  2) restrictive lung disease due to obesity and a very large hiatal hernia compressing the left lung     moderate pulmonary hypertension due to chronic hypoxemia    recurrent symptomatic electrolyte abnormalities - hypomagnesemia/hypophosphatemiz/hypokalemia/hypocalcemia felt to be related primarily to magnesium loss - anorexia, diarrhea, anti-acids, HCTZ, urine losses    PLAN/RECOMMENDATIONS:    oxygen supplementation to keep saturation greater than 92% at rest and with exertion - measure oxygen saturation on the ear or forehead  agree with discontinuation of prednisone but do not believe it was related to the electrolyte issues  symbicort 160/4.5mcg 2 puffs 2 times daily  incentive spiroemtry  cardiac meds: toprol XL/lipitor  glucose control  supplementing Mg and Ca  GI/DVT prophylaxis - protonix/SQ heparin  nephrology and endocrinology evaluations noted    Will follow with you. Plan of care discussed with the patient and her family at bedside. No pulmonary objection to discharge. Follow-up with Dr. Larissa Fields as planned.    Phong Iglesias MD, [facility] - [phone]  Pulmonary Medicine

## 2019-01-08 NOTE — PROGRESS NOTE ADULT - PROBLEM SELECTOR PLAN 2
Etiology?  Possible GI loss/poor PO intake  Supplement MgSO4 2gm IV one dose  Monitor Mg level  Urine Mg, 24 hrs urine for Mg.
Etiology?  Possible GI loss/poor PO intake  Supplemented MgSO4, better, getting PO Mg    Monitor Mg level  Urine Mg, 24 hrs urine for Mg.
sec to Urinary loss  Etiology is ?, does not have all the features of Gitelman syndrome (Alkalosis), urine calcium is still pending.  Not on any medication causing urinary loss at present. She was on HCTZ but that was discontinued in last admission. She is on PPI which in long term can cause hypomagenesemia through GI loss,  but she is having urinary loss of magenesium. Her 24 hrs urine collection is undercollection but urine magnesium is still very high. Repeat 24hrs urine K, Mg is being collected to have a baseline for follow up.  Since urine Mg is high, 24 hrs urine collection for 5HIAA can be discontinue, because if it was GI loss, urine Mg will be low.  Supplemented MgSO4 1gm IV today, Needs MgO 400mg TID on discharge  Monitor Mg level  Can be planned for discharge tomorrow if Mg level is normal on oral magnesium.
sec to Urinary loss  Etiology is ?, does not have all the features of Gitelman syndrome (Alkalosis), urine calcium is still pending.  Not on any medication causing urinary loss at present. She was on HCTZ but that was discontinued in last admission. She is on PPI which in long term can cause hypomagnesemia through GI loss,  but she is having urinary loss of magnesium. Her 24 hrs urine collection is undercollection but urine magnesium is still very high. Repeat 24hrs urine K, Mg is being collected to have a baseline for follow up.  Since urine Mg is high, 24 hrs urine collection for 5HIAA can be discontinue, because if it was GI loss, urine Mg will be low.  MgO 400mg TID on discharge  Monitor Mg level  Can be planned for discharge today

## 2019-01-09 LAB
COLLECT DURATION TIME UR: 24 HR — SIGNIFICANT CHANGE UP
MAGNESIUM 24H UR-MRATE: 48.3 MG/24H — HIGH (ref 14.6–24.2)
METANEPHRINE, PL: 44 PG/ML — SIGNIFICANT CHANGE UP (ref 0–62)
NORMETANEPHRINE, PL: 26 PG/ML — SIGNIFICANT CHANGE UP (ref 0–145)
POTASSIUM 24H UR-MRATE: 23 MMOL/24HR — LOW (ref 25–130)
TOTAL VOLUME - 24 HOUR: 680 ML — SIGNIFICANT CHANGE UP
URINE CREATININE CALCULATION: 0.6 G/24 H — LOW (ref 0.8–1.8)

## 2019-10-05 NOTE — CONSULT NOTE ADULT - CONSULT REQUESTED DATE/TIME
BELONGINGS with patient:   Blue jeans, 3x underwear, 4x pair of socks, green polo shirt, grey t-shirt, black and white metal  glasses,  blue Short sleeve button up shirt,  Red deadpool jersey      In security: 2x lighter, wisc id card, red bah[employer] band id card, 2x visa debt card, mastercard.     In locker: white tennis shoes with laces,  Black hoodie with strings,  Pink notebook with string,  misc paper, in green bag, 2 razors, shaving cream, unopened tooth brush, unopened tooth paste,  Black fleece with cord, grey sweat pants with strings,     In bin: dove body wash, dove dry spray, Biore cream, black glasses,     Money? Na     Belongings checked in by:                                                                                 Date:     I have verified these items were with me when I arrived:                                                                                    Date:    I have received all my items on my discharge day:                                                                               Date:     [facility] is not responsible for any personal items/belongings that I choose to keep in my possession during my hospitalization. I understand that any belongings which are not logged on this form are considered to be in my possession and are my responsibility.  
04-Jan-2019
04-Jan-2019 18:57
05-Jan-2019 10:38
07-Jan-2019 17:01

## 2020-01-01 ENCOUNTER — INPATIENT (INPATIENT)
Facility: HOSPITAL | Age: 75
LOS: 0 days | DRG: 682 | End: 2020-06-15
Attending: INTERNAL MEDICINE | Admitting: INTERNAL MEDICINE
Payer: MEDICARE

## 2020-01-01 VITALS
DIASTOLIC BLOOD PRESSURE: 73 MMHG | HEART RATE: 80 BPM | SYSTOLIC BLOOD PRESSURE: 98 MMHG | TEMPERATURE: 98 F | OXYGEN SATURATION: 93 % | RESPIRATION RATE: 25 BRPM

## 2020-01-01 VITALS — WEIGHT: 136.03 LBS | HEIGHT: 62 IN

## 2020-01-01 DIAGNOSIS — R06.02 SHORTNESS OF BREATH: ICD-10-CM

## 2020-01-01 DIAGNOSIS — E87.2 ACIDOSIS: ICD-10-CM

## 2020-01-01 DIAGNOSIS — E11.65 TYPE 2 DIABETES MELLITUS WITH HYPERGLYCEMIA: ICD-10-CM

## 2020-01-01 LAB
ALBUMIN SERPL ELPH-MCNC: 3.7 G/DL — SIGNIFICANT CHANGE UP (ref 3.3–5)
ALP SERPL-CCNC: 118 U/L — SIGNIFICANT CHANGE UP (ref 40–120)
ALT FLD-CCNC: 36 U/L — SIGNIFICANT CHANGE UP (ref 10–45)
ANION GAP SERPL CALC-SCNC: 31 MMOL/L — HIGH (ref 5–17)
ANION GAP SERPL CALC-SCNC: 36 MMOL/L — HIGH (ref 5–17)
ANISOCYTOSIS BLD QL: SLIGHT — SIGNIFICANT CHANGE UP
APPEARANCE UR: CLEAR — SIGNIFICANT CHANGE UP
APTT BLD: 22.8 SEC — LOW (ref 27.5–36.3)
AST SERPL-CCNC: 25 U/L — SIGNIFICANT CHANGE UP (ref 10–40)
B PERT DNA SPEC QL NAA+PROBE: SIGNIFICANT CHANGE UP
BACTERIA # UR AUTO: NEGATIVE — SIGNIFICANT CHANGE UP
BASE EXCESS BLDV CALC-SCNC: -12.1 MMOL/L — LOW (ref -2–2)
BASE EXCESS BLDV CALC-SCNC: -12.9 MMOL/L — LOW (ref -2–2)
BASE EXCESS BLDV CALC-SCNC: -20.1 MMOL/L — LOW (ref -2–2)
BASE EXCESS BLDV CALC-SCNC: -20.4 MMOL/L — LOW (ref -2–2)
BASOPHILS # BLD AUTO: 0 K/UL — SIGNIFICANT CHANGE UP (ref 0–0.2)
BASOPHILS NFR BLD AUTO: 0 % — SIGNIFICANT CHANGE UP (ref 0–2)
BILIRUB SERPL-MCNC: 0.8 MG/DL — SIGNIFICANT CHANGE UP (ref 0.2–1.2)
BILIRUB UR-MCNC: NEGATIVE — SIGNIFICANT CHANGE UP
BUN SERPL-MCNC: 117 MG/DL — HIGH (ref 7–23)
BUN SERPL-MCNC: 122 MG/DL — HIGH (ref 7–23)
C PNEUM DNA SPEC QL NAA+PROBE: SIGNIFICANT CHANGE UP
CA-I SERPL-SCNC: 1.21 MMOL/L — SIGNIFICANT CHANGE UP (ref 1.12–1.3)
CA-I SERPL-SCNC: 1.22 MMOL/L — SIGNIFICANT CHANGE UP (ref 1.12–1.3)
CA-I SERPL-SCNC: 1.25 MMOL/L — SIGNIFICANT CHANGE UP (ref 1.12–1.3)
CA-I SERPL-SCNC: 1.26 MMOL/L — SIGNIFICANT CHANGE UP (ref 1.12–1.3)
CALCIUM SERPL-MCNC: 9.5 MG/DL — SIGNIFICANT CHANGE UP (ref 8.4–10.5)
CALCIUM SERPL-MCNC: 9.5 MG/DL — SIGNIFICANT CHANGE UP (ref 8.4–10.5)
CHLORIDE BLDV-SCNC: 103 MMOL/L — SIGNIFICANT CHANGE UP (ref 96–108)
CHLORIDE BLDV-SCNC: 103 MMOL/L — SIGNIFICANT CHANGE UP (ref 96–108)
CHLORIDE BLDV-SCNC: 104 MMOL/L — SIGNIFICANT CHANGE UP (ref 96–108)
CHLORIDE BLDV-SCNC: 105 MMOL/L — SIGNIFICANT CHANGE UP (ref 96–108)
CHLORIDE SERPL-SCNC: 93 MMOL/L — LOW (ref 96–108)
CHLORIDE SERPL-SCNC: 97 MMOL/L — SIGNIFICANT CHANGE UP (ref 96–108)
CO2 BLDV-SCNC: 12 MMOL/L — LOW (ref 22–30)
CO2 BLDV-SCNC: 14 MMOL/L — LOW (ref 22–30)
CO2 BLDV-SCNC: 7 MMOL/L — LOW (ref 22–30)
CO2 BLDV-SCNC: 8 MMOL/L — LOW (ref 22–30)
CO2 SERPL-SCNC: 10 MMOL/L — CRITICAL LOW (ref 22–31)
CO2 SERPL-SCNC: <10 MMOL/L — CRITICAL LOW (ref 22–31)
COLOR SPEC: SIGNIFICANT CHANGE UP
CREAT SERPL-MCNC: 1.77 MG/DL — HIGH (ref 0.5–1.3)
CREAT SERPL-MCNC: 1.79 MG/DL — HIGH (ref 0.5–1.3)
CRP SERPL-MCNC: 0.56 MG/DL — HIGH (ref 0–0.4)
D DIMER BLD IA.RAPID-MCNC: 690 NG/ML DDU — HIGH
DIFF PNL FLD: NEGATIVE — SIGNIFICANT CHANGE UP
EOSINOPHIL # BLD AUTO: 0 K/UL — SIGNIFICANT CHANGE UP (ref 0–0.5)
EOSINOPHIL NFR BLD AUTO: 0 % — SIGNIFICANT CHANGE UP (ref 0–6)
EPI CELLS # UR: 1 /HPF — SIGNIFICANT CHANGE UP
FERRITIN SERPL-MCNC: 65 NG/ML — SIGNIFICANT CHANGE UP (ref 15–150)
FLUAV H1 2009 PAND RNA SPEC QL NAA+PROBE: SIGNIFICANT CHANGE UP
FLUAV H1 RNA SPEC QL NAA+PROBE: SIGNIFICANT CHANGE UP
FLUAV H3 RNA SPEC QL NAA+PROBE: SIGNIFICANT CHANGE UP
FLUAV SUBTYP SPEC NAA+PROBE: SIGNIFICANT CHANGE UP
FLUBV RNA SPEC QL NAA+PROBE: SIGNIFICANT CHANGE UP
GAS PNL BLDV: 133 MMOL/L — LOW (ref 135–145)
GAS PNL BLDV: 134 MMOL/L — LOW (ref 135–145)
GAS PNL BLDV: 134 MMOL/L — LOW (ref 135–145)
GAS PNL BLDV: 135 MMOL/L — SIGNIFICANT CHANGE UP (ref 135–145)
GAS PNL BLDV: SIGNIFICANT CHANGE UP
GIANT PLATELETS BLD QL SMEAR: PRESENT — SIGNIFICANT CHANGE UP
GLUCOSE BLDV-MCNC: 121 MG/DL — HIGH (ref 70–99)
GLUCOSE BLDV-MCNC: 148 MG/DL — HIGH (ref 70–99)
GLUCOSE BLDV-MCNC: 167 MG/DL — HIGH (ref 70–99)
GLUCOSE BLDV-MCNC: 167 MG/DL — HIGH (ref 70–99)
GLUCOSE SERPL-MCNC: 131 MG/DL — HIGH (ref 70–99)
GLUCOSE SERPL-MCNC: 175 MG/DL — HIGH (ref 70–99)
GLUCOSE UR QL: NEGATIVE — SIGNIFICANT CHANGE UP
GRAN CASTS # UR COMP ASSIST: 1 /LPF — SIGNIFICANT CHANGE UP
HADV DNA SPEC QL NAA+PROBE: SIGNIFICANT CHANGE UP
HCO3 BLDV-SCNC: 11 MMOL/L — LOW (ref 21–29)
HCO3 BLDV-SCNC: 13 MMOL/L — LOW (ref 21–29)
HCO3 BLDV-SCNC: 6 MMOL/L — LOW (ref 21–29)
HCO3 BLDV-SCNC: 7 MMOL/L — LOW (ref 21–29)
HCOV PNL SPEC NAA+PROBE: SIGNIFICANT CHANGE UP
HCT VFR BLD CALC: 38.2 % — SIGNIFICANT CHANGE UP (ref 34.5–45)
HCT VFR BLDA CALC: 29 % — LOW (ref 39–50)
HCT VFR BLDA CALC: 32 % — LOW (ref 39–50)
HGB BLD CALC-MCNC: 10.2 G/DL — LOW (ref 11.5–15.5)
HGB BLD CALC-MCNC: 10.5 G/DL — LOW (ref 11.5–15.5)
HGB BLD CALC-MCNC: 10.5 G/DL — LOW (ref 11.5–15.5)
HGB BLD CALC-MCNC: 9.3 G/DL — LOW (ref 11.5–15.5)
HGB BLD-MCNC: 10.5 G/DL — LOW (ref 11.5–15.5)
HMPV RNA SPEC QL NAA+PROBE: SIGNIFICANT CHANGE UP
HPIV1 RNA SPEC QL NAA+PROBE: SIGNIFICANT CHANGE UP
HPIV2 RNA SPEC QL NAA+PROBE: SIGNIFICANT CHANGE UP
HPIV3 RNA SPEC QL NAA+PROBE: SIGNIFICANT CHANGE UP
HPIV4 RNA SPEC QL NAA+PROBE: SIGNIFICANT CHANGE UP
HYALINE CASTS # UR AUTO: 11 /LPF — HIGH (ref 0–2)
INR BLD: 1.63 RATIO — HIGH (ref 0.88–1.16)
KETONES UR-MCNC: ABNORMAL
LACTATE BLDV-MCNC: 11.3 MMOL/L — CRITICAL HIGH (ref 0.7–2)
LACTATE BLDV-MCNC: 12.6 MMOL/L — CRITICAL HIGH (ref 0.7–2)
LACTATE BLDV-MCNC: 16 MMOL/L — CRITICAL HIGH (ref 0.7–2)
LACTATE BLDV-MCNC: 16 MMOL/L — CRITICAL HIGH (ref 0.7–2)
LEUKOCYTE ESTERASE UR-ACNC: NEGATIVE — SIGNIFICANT CHANGE UP
LYMPHOCYTES # BLD AUTO: 0.85 K/UL — LOW (ref 1–3.3)
LYMPHOCYTES # BLD AUTO: 6.2 % — LOW (ref 13–44)
MACROCYTES BLD QL: SLIGHT — SIGNIFICANT CHANGE UP
MAGNESIUM SERPL-MCNC: 2.2 MG/DL — SIGNIFICANT CHANGE UP (ref 1.6–2.6)
MANUAL SMEAR VERIFICATION: SIGNIFICANT CHANGE UP
MCHC RBC-ENTMCNC: 22.8 PG — LOW (ref 27–34)
MCHC RBC-ENTMCNC: 27.5 GM/DL — LOW (ref 32–36)
MCV RBC AUTO: 83 FL — SIGNIFICANT CHANGE UP (ref 80–100)
MICROCYTES BLD QL: SLIGHT — SIGNIFICANT CHANGE UP
MONOCYTES # BLD AUTO: 0.6 K/UL — SIGNIFICANT CHANGE UP (ref 0–0.9)
MONOCYTES NFR BLD AUTO: 4.4 % — SIGNIFICANT CHANGE UP (ref 2–14)
MYELOCYTES NFR BLD: 0.9 % — HIGH (ref 0–0)
NEUTROPHILS # BLD AUTO: 12.06 K/UL — HIGH (ref 1.8–7.4)
NEUTROPHILS NFR BLD AUTO: 86.7 % — HIGH (ref 43–77)
NEUTS BAND # BLD: 1.8 % — SIGNIFICANT CHANGE UP (ref 0–8)
NITRITE UR-MCNC: NEGATIVE — SIGNIFICANT CHANGE UP
NRBC # BLD: 2 /100 — HIGH (ref 0–0)
NT-PROBNP SERPL-SCNC: HIGH PG/ML (ref 0–300)
OB PNL STL: POSITIVE
OTHER CELLS CSF MANUAL: 11 ML/DL — LOW (ref 18–22)
OTHER CELLS CSF MANUAL: 5 ML/DL — LOW (ref 18–22)
OTHER CELLS CSF MANUAL: 9 ML/DL — LOW (ref 18–22)
PCO2 BLDV: 17 MMHG — LOW (ref 35–50)
PCO2 BLDV: 20 MMHG — LOW (ref 35–50)
PCO2 BLDV: 22 MMHG — LOW (ref 35–50)
PCO2 BLDV: 26 MMHG — LOW (ref 35–50)
PH BLDV: 7.17 — CRITICAL LOW (ref 7.35–7.45)
PH BLDV: 7.2 — CRITICAL LOW (ref 7.35–7.45)
PH BLDV: 7.31 — LOW (ref 7.35–7.45)
PH BLDV: 7.33 — LOW (ref 7.35–7.45)
PH UR: 6 — SIGNIFICANT CHANGE UP (ref 5–8)
PHOSPHATE SERPL-MCNC: 4.2 MG/DL — SIGNIFICANT CHANGE UP (ref 2.5–4.5)
PLAT MORPH BLD: ABNORMAL
PLATELET # BLD AUTO: 286 K/UL — SIGNIFICANT CHANGE UP (ref 150–400)
PO2 BLDV: 28 MMHG — SIGNIFICANT CHANGE UP (ref 25–45)
PO2 BLDV: 42 MMHG — SIGNIFICANT CHANGE UP (ref 25–45)
PO2 BLDV: 49 MMHG — HIGH (ref 25–45)
PO2 BLDV: 57 MMHG — HIGH (ref 25–45)
POIKILOCYTOSIS BLD QL AUTO: SLIGHT — SIGNIFICANT CHANGE UP
POLYCHROMASIA BLD QL SMEAR: SLIGHT — SIGNIFICANT CHANGE UP
POTASSIUM BLDV-SCNC: 4.7 MMOL/L — SIGNIFICANT CHANGE UP (ref 3.5–5.3)
POTASSIUM BLDV-SCNC: 4.9 MMOL/L — SIGNIFICANT CHANGE UP (ref 3.5–5.3)
POTASSIUM BLDV-SCNC: 5.5 MMOL/L — HIGH (ref 3.5–5.3)
POTASSIUM BLDV-SCNC: 6.1 MMOL/L — HIGH (ref 3.5–5.3)
POTASSIUM SERPL-MCNC: 5.1 MMOL/L — SIGNIFICANT CHANGE UP (ref 3.5–5.3)
POTASSIUM SERPL-MCNC: 5.9 MMOL/L — HIGH (ref 3.5–5.3)
POTASSIUM SERPL-SCNC: 5.1 MMOL/L — SIGNIFICANT CHANGE UP (ref 3.5–5.3)
POTASSIUM SERPL-SCNC: 5.9 MMOL/L — HIGH (ref 3.5–5.3)
PROCALCITONIN SERPL-MCNC: 0.06 NG/ML — SIGNIFICANT CHANGE UP (ref 0.02–0.1)
PROT SERPL-MCNC: 6.8 G/DL — SIGNIFICANT CHANGE UP (ref 6–8.3)
PROT UR-MCNC: ABNORMAL
PROTHROM AB SERPL-ACNC: 18.9 SEC — HIGH (ref 10–12.9)
RAPID RVP RESULT: SIGNIFICANT CHANGE UP
RBC # BLD: 4.6 M/UL — SIGNIFICANT CHANGE UP (ref 3.8–5.2)
RBC # FLD: 22.6 % — HIGH (ref 10.3–14.5)
RBC BLD AUTO: ABNORMAL
RBC CASTS # UR COMP ASSIST: 3 /HPF — SIGNIFICANT CHANGE UP (ref 0–4)
RSV RNA SPEC QL NAA+PROBE: SIGNIFICANT CHANGE UP
RV+EV RNA SPEC QL NAA+PROBE: SIGNIFICANT CHANGE UP
SAO2 % BLDV: 39 % — LOW (ref 67–88)
SAO2 % BLDV: 66 % — LOW (ref 67–88)
SAO2 % BLDV: 66 % — LOW (ref 67–88)
SAO2 % BLDV: 78 % — SIGNIFICANT CHANGE UP (ref 67–88)
SARS-COV-2 RNA SPEC QL NAA+PROBE: SIGNIFICANT CHANGE UP
SCHISTOCYTES BLD QL AUTO: SLIGHT — SIGNIFICANT CHANGE UP
SODIUM SERPL-SCNC: 136 MMOL/L — SIGNIFICANT CHANGE UP (ref 135–145)
SODIUM SERPL-SCNC: 138 MMOL/L — SIGNIFICANT CHANGE UP (ref 135–145)
SP GR SPEC: 1.02 — SIGNIFICANT CHANGE UP (ref 1.01–1.02)
TARGETS BLD QL SMEAR: SLIGHT — SIGNIFICANT CHANGE UP
TROPONIN T, HIGH SENSITIVITY RESULT: 7 NG/L — SIGNIFICANT CHANGE UP (ref 0–51)
TSH SERPL-MCNC: 2.66 UIU/ML — SIGNIFICANT CHANGE UP (ref 0.27–4.2)
UROBILINOGEN FLD QL: NEGATIVE — SIGNIFICANT CHANGE UP
WBC # BLD: 13.63 K/UL — HIGH (ref 3.8–10.5)
WBC # FLD AUTO: 13.63 K/UL — HIGH (ref 3.8–10.5)
WBC UR QL: 1 /HPF — SIGNIFICANT CHANGE UP (ref 0–5)

## 2020-01-01 PROCEDURE — 80053 COMPREHEN METABOLIC PANEL: CPT

## 2020-01-01 PROCEDURE — 85014 HEMATOCRIT: CPT

## 2020-01-01 PROCEDURE — 84132 ASSAY OF SERUM POTASSIUM: CPT

## 2020-01-01 PROCEDURE — 99285 EMERGENCY DEPT VISIT HI MDM: CPT | Mod: 25

## 2020-01-01 PROCEDURE — 99261: CPT

## 2020-01-01 PROCEDURE — 86803 HEPATITIS C AB TEST: CPT

## 2020-01-01 PROCEDURE — 87340 HEPATITIS B SURFACE AG IA: CPT

## 2020-01-01 PROCEDURE — 84443 ASSAY THYROID STIM HORMONE: CPT

## 2020-01-01 PROCEDURE — 83605 ASSAY OF LACTIC ACID: CPT

## 2020-01-01 PROCEDURE — 84145 PROCALCITONIN (PCT): CPT

## 2020-01-01 PROCEDURE — 83735 ASSAY OF MAGNESIUM: CPT

## 2020-01-01 PROCEDURE — 85730 THROMBOPLASTIN TIME PARTIAL: CPT

## 2020-01-01 PROCEDURE — 92950 HEART/LUNG RESUSCITATION CPR: CPT | Mod: GC

## 2020-01-01 PROCEDURE — 86706 HEP B SURFACE ANTIBODY: CPT

## 2020-01-01 PROCEDURE — 86704 HEP B CORE ANTIBODY TOTAL: CPT

## 2020-01-01 PROCEDURE — 31500 INSERT EMERGENCY AIRWAY: CPT | Mod: GC

## 2020-01-01 PROCEDURE — 96374 THER/PROPH/DIAG INJ IV PUSH: CPT | Mod: XU

## 2020-01-01 PROCEDURE — 84484 ASSAY OF TROPONIN QUANT: CPT

## 2020-01-01 PROCEDURE — 87798 DETECT AGENT NOS DNA AMP: CPT

## 2020-01-01 PROCEDURE — 82330 ASSAY OF CALCIUM: CPT

## 2020-01-01 PROCEDURE — 86140 C-REACTIVE PROTEIN: CPT

## 2020-01-01 PROCEDURE — 82272 OCCULT BLD FECES 1-3 TESTS: CPT

## 2020-01-01 PROCEDURE — 82728 ASSAY OF FERRITIN: CPT

## 2020-01-01 PROCEDURE — 87486 CHLMYD PNEUM DNA AMP PROBE: CPT

## 2020-01-01 PROCEDURE — 71045 X-RAY EXAM CHEST 1 VIEW: CPT

## 2020-01-01 PROCEDURE — 87633 RESP VIRUS 12-25 TARGETS: CPT

## 2020-01-01 PROCEDURE — 87581 M.PNEUMON DNA AMP PROBE: CPT

## 2020-01-01 PROCEDURE — 85610 PROTHROMBIN TIME: CPT

## 2020-01-01 PROCEDURE — 96375 TX/PRO/DX INJ NEW DRUG ADDON: CPT | Mod: XU

## 2020-01-01 PROCEDURE — 82947 ASSAY GLUCOSE BLOOD QUANT: CPT

## 2020-01-01 PROCEDURE — 51702 INSERT TEMP BLADDER CATH: CPT

## 2020-01-01 PROCEDURE — 84295 ASSAY OF SERUM SODIUM: CPT

## 2020-01-01 PROCEDURE — 84100 ASSAY OF PHOSPHORUS: CPT

## 2020-01-01 PROCEDURE — 82803 BLOOD GASES ANY COMBINATION: CPT

## 2020-01-01 PROCEDURE — 81001 URINALYSIS AUTO W/SCOPE: CPT

## 2020-01-01 PROCEDURE — 86705 HEP B CORE ANTIBODY IGM: CPT

## 2020-01-01 PROCEDURE — 36800 INSERTION OF CANNULA: CPT | Mod: GC

## 2020-01-01 PROCEDURE — 80048 BASIC METABOLIC PNL TOTAL CA: CPT

## 2020-01-01 PROCEDURE — 93005 ELECTROCARDIOGRAM TRACING: CPT | Mod: XU

## 2020-01-01 PROCEDURE — 82962 GLUCOSE BLOOD TEST: CPT

## 2020-01-01 PROCEDURE — 87040 BLOOD CULTURE FOR BACTERIA: CPT

## 2020-01-01 PROCEDURE — 85379 FIBRIN DEGRADATION QUANT: CPT

## 2020-01-01 PROCEDURE — 85027 COMPLETE CBC AUTOMATED: CPT

## 2020-01-01 PROCEDURE — 71045 X-RAY EXAM CHEST 1 VIEW: CPT | Mod: 26

## 2020-01-01 PROCEDURE — 99291 CRITICAL CARE FIRST HOUR: CPT | Mod: CS,GC

## 2020-01-01 PROCEDURE — 83880 ASSAY OF NATRIURETIC PEPTIDE: CPT

## 2020-01-01 PROCEDURE — 82435 ASSAY OF BLOOD CHLORIDE: CPT

## 2020-01-01 RX ORDER — DEXTROSE 50 % IN WATER 50 %
25 SYRINGE (ML) INTRAVENOUS ONCE
Refills: 0 | Status: DISCONTINUED | OUTPATIENT
Start: 2020-01-01 | End: 2020-01-01

## 2020-01-01 RX ORDER — ATORVASTATIN CALCIUM 80 MG/1
1 TABLET, FILM COATED ORAL
Qty: 0 | Refills: 0 | DISCHARGE

## 2020-01-01 RX ORDER — FLUTICASONE FUROATE AND VILANTEROL TRIFENATATE 100; 25 UG/1; UG/1
1 POWDER RESPIRATORY (INHALATION)
Qty: 0 | Refills: 0 | DISCHARGE

## 2020-01-01 RX ORDER — DEXTROSE 50 % IN WATER 50 %
12.5 SYRINGE (ML) INTRAVENOUS ONCE
Refills: 0 | Status: DISCONTINUED | OUTPATIENT
Start: 2020-01-01 | End: 2020-01-01

## 2020-01-01 RX ORDER — DESMOPRESSIN ACETATE 0.1 MG/1
20 TABLET ORAL ONCE
Refills: 0 | Status: COMPLETED | OUTPATIENT
Start: 2020-01-01 | End: 2020-01-01

## 2020-01-01 RX ORDER — GLUCAGON INJECTION, SOLUTION 0.5 MG/.1ML
1 INJECTION, SOLUTION SUBCUTANEOUS ONCE
Refills: 0 | Status: DISCONTINUED | OUTPATIENT
Start: 2020-01-01 | End: 2020-01-01

## 2020-01-01 RX ORDER — PIPERACILLIN AND TAZOBACTAM 4; .5 G/20ML; G/20ML
3.38 INJECTION, POWDER, LYOPHILIZED, FOR SOLUTION INTRAVENOUS ONCE
Refills: 0 | Status: COMPLETED | OUTPATIENT
Start: 2020-01-01 | End: 2020-01-01

## 2020-01-01 RX ORDER — DEXTROSE 50 % IN WATER 50 %
15 SYRINGE (ML) INTRAVENOUS ONCE
Refills: 0 | Status: DISCONTINUED | OUTPATIENT
Start: 2020-01-01 | End: 2020-01-01

## 2020-01-01 RX ORDER — DEXTROSE 50 % IN WATER 50 %
50 SYRINGE (ML) INTRAVENOUS ONCE
Refills: 0 | Status: COMPLETED | OUTPATIENT
Start: 2020-01-01 | End: 2020-01-01

## 2020-01-01 RX ORDER — CHLORHEXIDINE GLUCONATE 213 G/1000ML
1 SOLUTION TOPICAL
Refills: 0 | Status: DISCONTINUED | OUTPATIENT
Start: 2020-01-01 | End: 2020-01-01

## 2020-01-01 RX ORDER — METOPROLOL TARTRATE 50 MG
1 TABLET ORAL
Qty: 0 | Refills: 0 | DISCHARGE

## 2020-01-01 RX ORDER — INSULIN HUMAN 100 [IU]/ML
5 INJECTION, SOLUTION SUBCUTANEOUS ONCE
Refills: 0 | Status: COMPLETED | OUTPATIENT
Start: 2020-01-01 | End: 2020-01-01

## 2020-01-01 RX ORDER — CALCIUM GLUCONATE 100 MG/ML
2 VIAL (ML) INTRAVENOUS ONCE
Refills: 0 | Status: COMPLETED | OUTPATIENT
Start: 2020-01-01 | End: 2020-01-01

## 2020-01-01 RX ORDER — ONDANSETRON 8 MG/1
4 TABLET, FILM COATED ORAL ONCE
Refills: 0 | Status: DISCONTINUED | OUTPATIENT
Start: 2020-01-01 | End: 2020-01-01

## 2020-01-01 RX ORDER — FENTANYL CITRATE 50 UG/ML
12.5 INJECTION INTRAVENOUS ONCE
Refills: 0 | Status: DISCONTINUED | OUTPATIENT
Start: 2020-01-01 | End: 2020-01-01

## 2020-01-01 RX ORDER — VANCOMYCIN HCL 1 G
1000 VIAL (EA) INTRAVENOUS ONCE
Refills: 0 | Status: COMPLETED | OUTPATIENT
Start: 2020-01-01 | End: 2020-01-01

## 2020-01-01 RX ORDER — INSULIN LISPRO 100/ML
VIAL (ML) SUBCUTANEOUS
Refills: 0 | Status: DISCONTINUED | OUTPATIENT
Start: 2020-01-01 | End: 2020-01-01

## 2020-01-01 RX ORDER — INSULIN LISPRO 100/ML
VIAL (ML) SUBCUTANEOUS EVERY 6 HOURS
Refills: 0 | Status: DISCONTINUED | OUTPATIENT
Start: 2020-01-01 | End: 2020-01-01

## 2020-01-01 RX ORDER — CHOLECALCIFEROL (VITAMIN D3) 125 MCG
2 CAPSULE ORAL
Qty: 0 | Refills: 0 | DISCHARGE

## 2020-01-01 RX ORDER — UMECLIDINIUM 62.5 UG/1
62.5 AEROSOL, POWDER ORAL
Qty: 0 | Refills: 0 | DISCHARGE

## 2020-01-01 RX ORDER — INSULIN LISPRO 100/ML
VIAL (ML) SUBCUTANEOUS AT BEDTIME
Refills: 0 | Status: DISCONTINUED | OUTPATIENT
Start: 2020-01-01 | End: 2020-01-01

## 2020-01-01 RX ORDER — SODIUM CHLORIDE 9 MG/ML
1000 INJECTION, SOLUTION INTRAVENOUS
Refills: 0 | Status: DISCONTINUED | OUTPATIENT
Start: 2020-01-01 | End: 2020-01-01

## 2020-01-01 RX ORDER — AMLODIPINE BESYLATE 2.5 MG/1
1 TABLET ORAL
Qty: 0 | Refills: 0 | DISCHARGE

## 2020-01-01 RX ORDER — METFORMIN HYDROCHLORIDE 850 MG/1
4 TABLET ORAL
Qty: 0 | Refills: 0 | DISCHARGE

## 2020-01-01 RX ORDER — ATORVASTATIN CALCIUM 80 MG/1
40 TABLET, FILM COATED ORAL AT BEDTIME
Refills: 0 | Status: DISCONTINUED | OUTPATIENT
Start: 2020-01-01 | End: 2020-01-01

## 2020-01-01 RX ORDER — SODIUM BICARBONATE 1 MEQ/ML
50 SYRINGE (ML) INTRAVENOUS ONCE
Refills: 0 | Status: COMPLETED | OUTPATIENT
Start: 2020-01-01 | End: 2020-01-01

## 2020-01-01 RX ORDER — UMECLIDINIUM 62.5 UG/1
1 AEROSOL, POWDER ORAL
Qty: 0 | Refills: 0 | DISCHARGE

## 2020-01-01 RX ADMIN — Medication 50 MILLIEQUIVALENT(S): at 11:00

## 2020-01-01 RX ADMIN — DESMOPRESSIN ACETATE 220 MICROGRAM(S): 0.1 TABLET ORAL at 20:01

## 2020-01-01 RX ADMIN — FENTANYL CITRATE 12.5 MICROGRAM(S): 50 INJECTION INTRAVENOUS at 18:42

## 2020-01-01 RX ADMIN — Medication 250 MILLIGRAM(S): at 11:34

## 2020-01-01 RX ADMIN — Medication 50 MILLILITER(S): at 10:55

## 2020-01-01 RX ADMIN — Medication 200 GRAM(S): at 10:59

## 2020-01-01 RX ADMIN — PIPERACILLIN AND TAZOBACTAM 200 GRAM(S): 4; .5 INJECTION, POWDER, LYOPHILIZED, FOR SOLUTION INTRAVENOUS at 11:16

## 2020-01-01 RX ADMIN — INSULIN HUMAN 5 UNIT(S): 100 INJECTION, SOLUTION SUBCUTANEOUS at 10:57

## 2020-06-15 NOTE — CONSULT NOTE ADULT - ASSESSMENT
73 y/o COPD, HTN, Type 2 DM admitted with worsening leg edema over the past weeks and increasing SOB and dizziness.    Patient with above, worsening leg edema for the past few months, started on low dose diuretics, plans for V/Q scan for pulmonary hypertension.  Type 2 DM treated with metformin 2,000 mg daily and starlix 120 mg TID. Normal renal function in the past, last renal function on diuretics 44/1.43 in April.  Over the past few weeks with worsening leg edema, no fever or chills. Last few days with complaints of dizziness numbness and SOB.  On admission patient noted with hypotension, hypothermia, worsening renal function 122/1.7 and severe lactic acidosis.  Patient denies ant acute illness over the past few days, decreased PO intake.    Patient with worsening renal function admitted with no history of recent febrile illness with acute renal failure, hyperkalemia, hypotension hypothermia, and lactic acidosis. Strongly suspect lactic acidosis due to treatment with metformin in the settings of CHF and worsening renal function, no other clear infections cause is clear.  D/W renal, as patient is borderline hypotensive, and with signs of fluid overload agree with CRRT.    TFT's are normal, Check AM cortisol and HbA1c.  Follow FS with low scale humalog as needed.  Treatment per MICU and renal, pulmonary noted.

## 2020-06-15 NOTE — CONSULT NOTE ADULT - ATTENDING COMMENTS
Agree with above.  Dyspnea with LE edema  Afebrile MAP 65-75  LE edema and dyspnea may be pulmonary hypertension d/t profound emphysema  Tachypenic, found to have elevated lactate, likely B2 d/t metformin toxicity.  Bicarb low with appropriate respiratory compensation, needs urgent HD with catheter placement  May need pressor support during HD  Admit to ICU    I have personally provided 45 minutes of critical care time.

## 2020-06-15 NOTE — ED PROVIDER NOTE - ATTENDING CONTRIBUTION TO CARE
DO Angeline: patient seen and evaluated with the resident.  I was present for key portions of the History & Physical, and I agree with the Impression & Plan.  Angeline, DO: 73yo F pmhx COPD, DM on metformin, "chronic electrolyte abnormalities", hypothyroidism, pulmonary HTN p/w CC SOB. Pt. states that since yesterday she has had worsening difficulty breathing, and worsening LE swelling for the past week. Occasional cough. Denies fever chills CP n/v/d abd pain back pain urinary symptoms.  Gen: Moderate distress  HEENT: NCAT, EOMI, PERRLA.  CV: RRR, S1S2  Resp: +Tachypneic, CTA B/L  GI: Abdomen soft, NT, ND.   Neuro: A&Ox3, no motor or sensory deficits.  MSK: No gross abnormalities.  Skin: Pale appearing. Warm, dry intact. No rashes.   73yo F w/ SOB worsening LE edema, possible R sided HF secondary to worsening pulm edema vs PE vs CHF exacerbation or volume overload 2/2 renal failure - will also start infectious workup, viral syndrome/COVID/PNA also a possibility - will monitor closely, send labs, cxr, CTA w/ IV contrast if kidneys can tolerate - will hold of on fluid resuscitation at the moment as there is a concern for volume overload.

## 2020-06-15 NOTE — CONSULT NOTE ADULT - ASSESSMENT
74 year-old female with history of hypothyroidism, COPD, pulmonary HTN, DM presents to the Emergency Department for shortness of breath.  Patient reports she has been having shortness of breath ongoing since yesterday; worsening.  No associated fevers, chills, nausea, vomiting, chest pain, cough, lightheadedness.  + LE swelling bilaterally has been ongoing for the past week.  Patient was scheduled for a V/Q scan scheduled today.  pt with hx of emphysema with increasing sob and acute renal failure with severe acidosis.  agree with CRRT   pt with pulmonary vascular congestion and fluid over load.  asa daily  cardiac enzymes  awaiting ecg  repeat echo  awaiting vq scan  hold all bp meds

## 2020-06-15 NOTE — CONSULT NOTE ADULT - ASSESSMENT
73 yo woman with COPD, HTN, DM p/w SOB x 1 day a/w 1 week increasing SNOW, noted with anion gap metabolic acidosis (pH 7.2, HCO3 <10, lactate 16) and PINKY (Cr. , likely 2/2 73 yo woman with COPD, HTN, DM p/w SOB x 1 day a/w 1 week increasing SNOW, noted with anion gap metabolic acidosis (pH 7.2, HCO3 <10, lactate 16) likely 2/2 metformin.     Recommend  - Pressor supported CRRT given labile BP 73 yo woman with COPD, HTN, DM (on metformin) p/w SOB x 1 day a/w 1 week increasing SNOW, found with compensated anion gap lactic acidosis (pH 7.2, HCO3 <6, lactate 16, CO2 17), likely 2/2 metformin, admitted to MICU for urgent CRRT    NEUROLOGIC:  Alteration of mental status present. Likely due to ____ (structural (bleed or stroke), encephalitis, meningitis, non convulsive status epilepticus, metabolic cause (endogenous vs exogenous)  Eligible for early mobilization and immediate physical therapy?    SKIN:  Lines:  Decubiti:    GI:  Diet:  Bowel regiment:    Urinary tract:  UO:   Obregon:       METABOLIC:  BMP showing evidence of   Liver functions tests show   Endocrine abnormalities include  06-15    136  |  93<L>  |  122<H>  ----------------------------<  175<H>  5.9<H>   |  <10<LL>  |  1.77<H>    Ca    9.5      15 Yunier 2020 09:52  Phos  4.7     06-15  Mg     2.4     06-15    TPro  6.8  /  Alb  3.7  /  TBili  0.8  /  DBili  x   /  AST  25  /  ALT  36  /  AlkPhos  118  06-15      VOLUME ASSESSMENT:  No peripheral edema  No evidence of disturbance of effective circulating volume    HEMATOLOGIC:  CBC results show  Coag panel shows  DVT prophylaxis with                         10.5   13.63 )-----------( 286      ( 15 Yunier 2020 09:52 )             38.2     PT/INR - ( 15 Yunier 2020 09:52 )   PT: 18.9 sec;   INR: 1.63 ratio         PTT - ( 15 Yunier 2020 09:52 )  PTT:22.8 sec    INFECTIOUS DISEASE:  Pt without strong objective or clinical evidence of infection. Will observe off antibiotics    HEMODYNAMICS:  Patient is on pressors due to ____ shock (cardiogenic due to muscle or valve failure, obstructive due to peff, PE, PTX, hypovolemic, vasopegic)     CARDIOVASCULAR:    RESPIRATORY: 75 yo woman with COPD, HTN, DM (on metformin) p/w SOB x 1 day a/w 1 week increasing SNOW, found with compensated anion gap lactic acidosis (pH 7.2, HCO3 <6, lactate 16, CO2 17), likely 2/2 metformin, admitted to MICU for urgent CRRT    NEUROLOGIC:  - AOx3    GI:  - Diet: DASH/CC    Urinary tract:  - Obregon placed 6/15 for strict I/Os    METABOLIC:  - Metabolic acidosis 2/2 lactate. Likely type B2 in setting of metformin.   - CRRT in ICU, for potential pressure support given labial BP    VOLUME ASSESSMENT:  - No peripheral edema  - No evidence of disturbance of effective circulating volume    HEMATOLOGIC:  CBC results show  Coag panel shows  DVT prophylaxis with                         10.5   13.63 )-----------( 286      ( 15 Yunier 2020 09:52 )             38.2     PT/INR - ( 15 Yunier 2020 09:52 )   PT: 18.9 sec;   INR: 1.63 ratio         PTT - ( 15 Yunier 2020 09:52 )  PTT:22.8 sec    INFECTIOUS DISEASE:  Pt without strong objective or clinical evidence of infection. Will observe off antibiotics    HEMODYNAMICS:  Patient is on pressors due to ____ shock (cardiogenic due to muscle or valve failure, obstructive due to peff, PE, PTX, hypovolemic, vasopegic)     CARDIOVASCULAR:    RESPIRATORY: 73 yo woman with COPD, HTN, DM (on metformin) p/w SOB x 1 day a/w 1 week increasing SNOW, found with compensated anion gap lactic acidosis (pH 7.2, HCO3 <6, lactate 16, CO2 17), likely 2/2 metformin, admitted to MICU for urgent CRRT    NEUROLOGIC:  - AOx3    GI:  - Diet: DASH/CC    Urinary tract:  - Obregon placed 6/15 for strict I/Os    METABOLIC:  - Metabolic acidosis 2/2 lactate. Likely type B2 in setting of metformin.   - pH 7.2 on admission with bicarb <7, lactate 16  - CRRT in ICU, for potential pressure support given labial BP    VOLUME ASSESSMENT:  - 1 plus pitting edema of lower extreme b/l. Suspect in setting of elevated RV pressure in setting known pulm HTN in setting of COPD    HEMATOLOGIC:  - Normocytic anemia. Hgb 10.5 on admission. Baseline ~13. +Occult blood    INFECTIOUS DISEASE:  - Pt without strong objective or clinical evidence of infection. Will observe off antibiotics    HEMODYNAMICS:  - Currently hemodynamically stable  - Will consider starting pressors during CRRT if BP remains labile (MAPs 60-65)    CARDIOVASCULAR:  - TTE 12/2018:  EF 66%, estimated RV pressure 49mm Hg, consistent with mild pulmonary hypertension.  - +SNOW on exam, likely 2/2 RV dysfunction     RESPIRATORY:  - Tachypnea likely compensatory given profound metabolic acidosis which is appropriately compensated (CO2 17). Expect to improve with CRRT. 73 yo woman with COPD, HTN, DM (on metformin) p/w SOB x 1 day a/w 1 week increasing SNOW, found with compensated anion gap lactic acidosis (pH 7.2, HCO3 <6, lactate 16, CO2 17), likely 2/2 metformin, admitted to MICU for urgent CRRT    NEUROLOGIC:  - AOx3    GI:  - Diet: DASH/CC    Urinary tract:  - Obregon placed 6/15 for strict I/Os    METABOLIC:  - Metabolic acidosis 2/2 lactate. Likely type B2 in setting of metformin.   - pH 7.2 on admission with bicarb <7, lactate 16  - CRRT in ICU, for potential pressure support given labial BP    VOLUME ASSESSMENT:  - 1 plus pitting edema of lower extreme b/l. Suspect in setting of elevated RV pressure in setting known pulm HTN in setting of COPD    HEMATOLOGIC:  - Normocytic anemia. Hgb 10.5 on admission. Baseline ~13. +Occult blood    INFECTIOUS DISEASE:  - Pt without strong objective or clinical evidence of infection. Will observe off antibiotics    HEMODYNAMICS:  - Currently hemodynamically stable  - Will consider starting pressors during CRRT if BP remains labile (MAPs 60-65)    CARDIOVASCULAR:  - TTE 12/2018:  EF 66%, estimated RV pressure 49mm Hg, consistent with mild pulmonary hypertension.  - +SNOW on exam, likely 2/2 RV dysfunction     RESPIRATORY:  - Tachypnea likely compensatory given profound metabolic acidosis which is appropriately compensated (CO2 17). Expect to improve with CRRT.  - Low suspicion for PE. Wells score 0. YEARS score 0.

## 2020-06-15 NOTE — ED PROVIDER NOTE - OBJECTIVE STATEMENT
74 year-old female with history of hypothyroidism, COPD, pulmonary HTN, DM presents to the Emergency Department for shortness of breath.  Patient reports she has been having shortness of breath ongoing since yesterday; worsening.  No associated fevers, chills, nausea, vomiting, chest pain, cough, lightheadedness.  + LE swelling bilaterally has been ongoing for the past week.  Patient was scheduled for a V/Q scan scheduled today. 74 year-old female with history of hypothyroidism, COPD, pulmonary HTN, DM presents to the Emergency Department for shortness of breath.  Patient reports she has been having shortness of breath ongoing since yesterday; worsening.  No associated fevers, chills, nausea, vomiting, chest pain, cough, lightheadedness.  + LE swelling bilaterally has been ongoing for the past week.  Patient was scheduled for a V/Q scan scheduled today.  Contact Info:   - [phone]  Daughter (Fifi) - [phone]  Son-in-law (Dr. Hernandez) - [phone]

## 2020-06-15 NOTE — ED ADULT NURSE REASSESSMENT NOTE - NS ED NURSE REASSESS COMMENT FT1
Obregon catheterization performed using sterile technique with 2 RNs at the bedside; pt tolerated procedure well. Drained 200 cc clear urine, sterile specimen collected, & UA sent. Bedside drainage to gravity & stat lock in place. Pt resting comfortably without complaints & does not appear to be in any acute distress at this time with VSS. Will continue to reassess.

## 2020-06-15 NOTE — CONSULT NOTE ADULT - SUBJECTIVE AND OBJECTIVE BOX
HPI:  75 yo woman with COPD, HTN, DM (on metformin) p/w SOB x 1 day a/w 1 week increasing SNOW. Pt states she was in her usual state of health but has noted increased SNOW for the past week. The night PTA pt developed acute SOB. No orthopnea or PND Exercise tolerance difficult to assess as pt has been less ambulatory since COVID-19 pandemic. Pt endorses decreased urine output over past 3 days. No change in diet or medications. Denies recent chest pain, cough, fever/chills, nausea/vomiting, diarrhea, dysuria.     ED labs notable for compensated anion gap metabolic acidosis (pH 7.2, HCO3 <6, lactate 16, CO2 17); BNP 12,500, Troponin negative, EKG without ischemic findings; Cr 1.7 (baseline ~1). (15 Yunier 2020 16:51)      Admit Diagnosis  Shortness of breath      ENDOCRINE HPI: 73 y/o COPD, HTN, Type 2 DM admitted with worsening leg edema over the past weeks and increasing SOB and dizziness.    Patient with above, worsening leg edema for the past few months, started on low dose diuretics, plans for V/Q scan for pulmonary hypertension.  Type 2 DM treated with metformin 2,000 mg daily and starlix 120 mg TID. Normal renal function in the past, last renal function on diuretics 44/1.43 in April.  Over the past few weeks with worsening leg edema, no fever or chills. Last few days with complaints of dizziness numbness and SOB.  On admission patient noted with hypotension, hypothermia, worsening renal function 122/1.7 and severe lactic acidosis.  Patient denies ant acute illness over the past few days, decreased PO intake.      PAST MEDICAL & SURGICAL HISTORY:  HLD (hyperlipidemia)  DM (diabetes mellitus)  HTN (hypertension)  No significant past surgical history      FAMILY HISTORY:  No pertinent family history in first degree relatives      Social History:    Outpatient Medications: metformin 2,000 mg daily, Starlix 120 mg TID.    MEDICATIONS  (STANDING):  atorvastatin 40 milliGRAM(s) Oral at bedtime  chlorhexidine 4% Liquid 1 Application(s) Topical <User Schedule>  desmopressin IVPB 20 MICROGram(s) IV Intermittent once  dextrose 5%. 1000 milliLiter(s) (50 mL/Hr) IV Continuous <Continuous>  dextrose 50% Injectable 12.5 Gram(s) IV Push once  dextrose 50% Injectable 25 Gram(s) IV Push once  dextrose 50% Injectable 25 Gram(s) IV Push once  fentaNYL    Injectable 12.5 MICROGram(s) IV Push once  insulin lispro (HumaLOG) corrective regimen sliding scale   SubCutaneous three times a day before meals  insulin lispro (HumaLOG) corrective regimen sliding scale   SubCutaneous at bedtime    MEDICATIONS  (PRN):  dextrose 40% Gel 15 Gram(s) Oral once PRN Blood Glucose LESS THAN 70 milliGRAM(s)/deciliter  glucagon  Injectable 1 milliGRAM(s) IntraMuscular once PRN Glucose LESS THAN 70 milligrams/deciliter      Allergies    No Known Allergies    Intolerances        Review of Systems:  Constitutional: No fever, no chills.  Eyes: blurry vision  Neuro: No tremors, numbness, dizziness.  HEENT: No pain  Cardiovascular: No chest pain, palpitations  Respiratory: increasing SOB, no cough  GI: No nausea, vomiting, abdominal pain  : No dysuria  Skin: no rash, bilateral leg edema.  Psych: no depression  Endocrine: no polyuria, polydipsia, decreased urine output.  Hem/lymph: no swelling  Osteoporosis: no fractures    ALL OTHER SYSTEMS REVIEWED AND NEGATIVE      PHYSICAL EXAM:  VITALS: T(C): 36.4 (06-15-20 @ 16:41)  T(F): 97.6 (06-15-20 @ 16:41), Max: 97.6 (06-15-20 @ 16:41)  HR: 82 (06-15-20 @ 18:07) (77 - 85)  BP: 107/71 (06-15-20 @ 18:07) (86/63 - 126/86)  RR:  (25 - 36)  SpO2:  (90% - 100%)                          10.5   13.63 )-----------( 286      ( 15 Yunier 2020 09:52 )             38.2       06-15    136  |  93<L>  |  122<H>  ----------------------------<  175<H>  5.9<H>   |  <10<LL>  |  1.77<H>    Ca    9.5      15 Yunier 2020 09:52  Phos  4.7     06-15  Mg     2.4     06-15    TPro  6.8  /  Alb  3.7  /  TBili  0.8  /  DBili  x   /  AST  25  /  ALT  36  /  AlkPhos  118  06-15      Thyroid Function Tests:  06-15 @ 13:53 TSH 2.66 FreeT4 -- T3 -- Anti TPO -- Anti Thyroglobulin Ab -- TSI --              Radiology:

## 2020-06-15 NOTE — H&P ADULT - NSHPREVIEWOFSYSTEMS_GEN_ALL_CORE
CONSTITUTIONAL: No fevers or chills  EYES: No blindness, no eye pain   ENT:  No throat pain, No rhinorrhea   NECK: No pain or stiffness  RESPIRATORY: +SOB. No cough, wheezing, hemoptysis;  CARDIOVASCULAR: No chest pain or palpitations  GASTROINTESTINAL: No abdominal or epigastric pain. No nausea, vomiting, or diarrhea   GENITOURINARY: No dysuria, change in frequency or hematuria  NEUROLOGICAL: No numbness or weakness  SKIN: No itching, burning, rashes, or lesions   PSYCH: No depression or anxiety  All other review of systems is negative unless indicated above.

## 2020-06-15 NOTE — PROCEDURE NOTE - NSPROCDETAILS_GEN_ALL_CORE
difficult/crash intubation
sterile dressing applied/lumen(s) aspirated and flushed/guidewire recovered/ultrasound guidance/sterile technique, catheter placed

## 2020-06-15 NOTE — H&P ADULT - HISTORY OF PRESENT ILLNESS
75 yo woman with COPD, HTN, DM (on metformin) p/w SOB x 1 day a/w 1 week increasing SNOW. Pt states she was in her usual state of health but has noted increased SNOW for the past week. The night PTA pt developed acute SOB. No orthopnea or PND Exercise tolerance difficult to assess as pt has been less ambulatory since COVID-19 pandemic. Pt endorses decreased urine output over past 3 days. No change in diet or medications. Denies recent chest pain, cough, fever/chills, nausea/vomiting, diarrhea, dysuria.     ED labs notable for compensated anion gap metabolic acidosis (pH 7.2, HCO3 <6, lactate 16, CO2 17); BNP 12,500, Troponin negative, EKG without ischemic findings; Cr 1.7 (baseline ~1).

## 2020-06-15 NOTE — ED PROVIDER NOTE - CLINICAL SUMMARY MEDICAL DECISION MAKING FREE TEXT BOX
74 year-old female with history of hypothyroidism, COPD, pulmonary HTN, DM presents to the Emergency Department for shortness of breath; concern for pulmonary HTN, CHF, ACS, PNA.  Plan for CTA, blood work and reassess.
S/P cataract extraction, unspecified laterality  bilateral - 2016  S/P lumpectomy, left breast

## 2020-06-15 NOTE — H&P ADULT - NSHPLABSRESULTS_GEN_ALL_CORE
LABS:              10.5   13.63 )-----------( 286      ( 15 Yunier 2020 09:52 )             38.2     06-15    136  |  93<L>  |  122<H>  ----------------------------<  175<H>  5.9<H>   |  <10<LL>  |  1.77<H>    Ca    9.5      15 Yunier 2020 09:52  Phos  4.7     06-15  Mg     2.4     -15    TPro  6.8  /  Alb  3.7  /  TBili  0.8  /  DBili  x   /  AST  25  /  ALT  36  /  AlkPhos  118  06-15    LIVER FUNCTIONS - ( 15 Yunier 2020 09:52 )  Alb: 3.7 g/dL / Pro: 6.8 g/dL / ALK PHOS: 118 U/L / ALT: 36 U/L / AST: 25 U/L / GGT: x           Urinalysis Basic - ( 15 Yunier 2020 12:54 )  Color: Light Yellow / Appearance: Clear / S.021 / pH: x  Gluc: x / Ketone: Small  / Bili: Negative / Urobili: Negative   Blood: x / Protein: 30 mg/dL / Nitrite: Negative   Leuk Esterase: Negative / RBC: 3 /hpf / WBC 1 /HPF   Sq Epi: x / Non Sq Epi: 1 /hpf / Bacteria: Negative    PT/INR - ( 15 Yunier 2020 09:52 )   PT: 18.9 sec;   INR: 1.63 ratio    PTT - ( 15 Yunier 2020 09:52 )  PTT:22.8 sec    IMAGING:  Xray Chest 1 View- PORTABLE-Routine (06.15.20 @ 10:22)    Impression:  The heart is normal in size. Left lower lobe pneumonia and/or atelectasis. Pulmonary vascular congestion. The bones appear to be demineralized.

## 2020-06-15 NOTE — CONSULT NOTE ADULT - SUBJECTIVE AND OBJECTIVE BOX
[facility] - [facility] - CONSULT NOTE    HPI: 74 year old gentlewoman, former smoker, with history of HTN, HLD, DM without known CAD. The patient is followed by Dr. Larissa Fields of our practice for COPD/emphysema maintained on Anoro Ellipta. A recent ECHO revealed a preserved LVEF, no significant valvular heart disease and moderate pulmonary hypertension. The patient was admitted last year with multiple electrolyte abnormalities including hypokalemia, hypocalcemia, hypophosphatemia and hypomagnesemia which were corrected - no etiology was discerned - diuretics were discontinued. She reports increasing shortness of breath over the last several weeks not associated with oxygen desaturation, cough, sputum production, chest congestion or wheeze. She has no fevers, chills or sweats. No chest pain/pressure or palpitations. She was awaiting a V/Q scan later this week. She comes to the ER with worsening shortness of breath. The patient has been found to have a PINKY and a severe anion gap metabolic acidosis with a marked lactic acidosis. She has no abdominal pain. She has mild lower extremity swelling. Asked to evaluate.    PMHX:  Chronic hypoxic respiratory failure  COPD/emphysema  HLD (hyperlipidemia)  DM (diabetes mellitus)  HTN (hypertension)  Hiatal hernia  Arthritis      PSHX:  Bilateral hip replacement  Left knee replacement  Distal patellar realignment      FAMILY HISTORY:  mother - blood disorder  father - diabetes      SOCIAL HISTORY:  former smoker ~ 40 years    Pulmonary Medications:       Antimicrobials:      Cardiology:      Other:      Prn:  MEDICATIONS  (PRN):      Allergies    No Known Allergies    HOME MEDICATIONS: torsemide; anoro ellipta; lotrel; Mag-Ox; glucophage XR; toprol XL; lipitor; calcium carbonate; norvasc; oysco; prilosec    REVIEW OF SYSTEMS:  Constitutional: As per HPI  HEENT: Within normal limits  CV: As per HPI  Resp: As per HPI  GI: Within normal limits   : Within normal limits  Musculoskeletal: Within normal limits  Skin: Within normal limits  Neurological: Within normal limits  Psychiatric: Within normal limits  Endocrine: Within normal limits  Hematologic/Lymphatic: Within normal limits  Allergic/Immunologic: Within normal limits    [x] All other systems negative    OBJECTIVE:    Daily Height in cm: 157.48 (15 Yunier 2020 09:12)      PHYSICAL EXAM:  ICU Vital Signs Last 24 Hrs  T(C): 36 (15 Yunier 2020 14:55), Max: 36 (15 Yunier 2020 14:55)  T(F): 96.8 (15 Yunier 2020 14:55), Max: 96.8 (15 Yunier 2020 14:55)  HR: 81 (15 Yunier 2020 14:55) (77 - 85)  BP: 97/58 (15 Yunier 2020 14:55) (86/63 - 126/86)  BP(mean): --  ABP: --  ABP(mean): --  RR: 27 (15 Yunier 2020 14:55) (25 - 36)  SpO2: 97% (15 Yunier 2020 14:55) (90% - 100%) on 2lpm nasal canula    General: Awake. Alert. Cooperative. No distress. Appears stated age 	  HEENT:  Atraumatic. Bitemporal wasting. Anicteric. Normal oral mucosa. PERRL. EOMI.  Neck: Supple. Trachea midline. Thyroid without enlargement/tenderness/nodules. No carotid bruit. No JVD.	  Cardiovascular: Regular rate and rhythm. S1 S2 normal. No murmurs, rubs or gallops.  Respiratory: Respirations unlabored. Decreased breath sounds left base with dullness to percussion. No curvature.  Abdomen: Soft. Non-tender. Non-distended. No organomegaly. No masses. Normal bowel sounds.  Extremities: Warm to touch. No clubbing or cyanosis. Trace pretibial edema edema.  Pulses: 2+ peripheral pulses all extremities.	  Skin: Normal skin color. No rashes or lesions. No ecchymoses. No cyanosis. Warm to touch.  Lymph Nodes: Cervical, supraclavicular and axillary nodes normal  Neurological: Motor and sensory examination equal and normal. A and O x 3  Psychiatry: Appropriate mood and affect.      LABS:                          10.5   13.63 )-----------( 286      ( 15 Yunier 2020 09:52 )             38.2     CBC    WBC  13.63 <==    Hemoglobin  10.5 <<==    Hematocrit  38.2 <==    Platelets  286 <==      136  |  93<L>  |  122<H>  ----------------------------<  175<H>    06-15  5.9<H>   |  <10<LL>  |  1.77<H>    LYTES    sodium  136 <==    potassium   5.9 <==    chloride  93 <==    carbon dioxide  <10 <==    =============================================================================================  RENAL FUNCTION:    Creatinine:   1.77  <<==    BUN:   122 <==    ============================================================================================    calcium   9.5 <==    phos   4.7 <==    mag   2.4 <==    ============================================================================================  LFTs    AST:   25 <==     ALT:  36  <==     AP:  118  <=    Bili:  0.8  <=    PT/INR - ( 15 Yunier 2020 09:52 )   PT: 18.9 sec;   INR: 1.63 ratio       PTT - ( 15 Yunier 2020 09:52 )  PTT:22.8 sec    Venous Blood Gas:  06-15 @ 10:34  7./57/  VBG Lactate: 16.0    Venous Blood Gas:  06-15 @ 09:52  7.//49/  VBG Lactate: 16.0    Procalcitonin, Serum: 0.06 ng/mL (06-15 @ 09:52)    Serum Pro-Brain Natriuretic Peptide: 37992 pg/mL (06-15 @ 09:52)    D-Dimer Assay, Quantitative: 690 ng/mL DDU (06-15 @ 09:52)    CARDIAC MARKERS ( 15 Yunier 2020 09:52 )  CPK x     /CKMB x     /CKMB Units x        troponin 7 ng/L    MICROBIOLOGY:     COVID-19 PCR . (06.15.20 @ 10:16)    COVID-19 PCR: NotDetec: This test has been validated by [employer] to be accurate;  though it has not been FDA cleared/approved by the usual pathway.  As with all laboratory tests, results should be correlated with clinical  findings.  https://www.fda.gov/media/950675/download  https://www.fda.gov/media/373534/download    Urinalysis Basic - ( 15 Yunier 2020 12:54 )    Color: Light Yellow / Appearance: Clear / S.021 / pH: x  Gluc: x / Ketone: Small  / Bili: Negative / Urobili: Negative   Blood: x / Protein: 30 mg/dL / Nitrite: Negative   Leuk Esterase: Negative / RBC: 3 /hpf / WBC 1 /HPF   Sq Epi: x / Non Sq Epi: 1 /hpf / Bacteria: Negative    RADIOLOGY:  [x ] Chest radiographs reviewed and interpreted by me      EXAM:  XR CHEST PORTABLE ROUTINE 1V                          PROCEDURE DATE:  06/15/2020      INTERPRETATION:  A single chest x-ray was obtained on Chloe 15, 2020.    Indication: Shortness of breath.    Impression:    The heart is normal in size. Left lower lobe pneumonia and/or atelectasis. Pulmonary vascular congestion. The bones appear to be demineralized.    WILBUR GOMEZ M.D., ATTENDING RADIOLOGIST  This document has been electronically signed. Yunier 15 2020 10:17AM  ---------------------------------------------------------------------------------------------------------------

## 2020-06-15 NOTE — ED ADULT NURSE REASSESSMENT NOTE - NS ED NURSE REASSESS COMMENT FT1
Report received from Pauly TILLEY. 74y F PMHx COPD on 2L NC at HS, pulmonary HTN, DM, HTN, & hypothyroidism presents to ED from home with worsening SOB since yesterday. Denies CP, H/A, N/V/D, abdominal pain, f/c, dizziness, & urinary symptoms. A&Ox4. Audible crackles & pt tachypneic to 30s on 4L NC, so increased to 6L. Pt maintaining SpO2 in 90s. Pt in NSR. Abdomen soft, nondistened, & nontender to palpation. Skin warm, dry, & intact. MAEx4. +3 BLE edema noted on exam, & pt reports that this edema has been worsening more recently. Endorses orthopnea. Pt resting comfortably with Beir hugger with no complaints at this time. Pt's bed in the lowest position & explained POC to pt. Will continue to reassess. Report received from Pauly TILLEY. 74y F PMHx COPD on 2L NC at HS, pulmonary HTN, DM, HTN, & hypothyroidism presents to ED from home with worsening SOB since yesterday. Denies CP, H/A, N/V/D, abdominal pain, f/c, dizziness, & urinary symptoms. A&Ox4. Audible crackles & pt tachypneic to 30s on 4L NC, so increased to 6L. Pt maintaining SpO2 in 90s. Pt in NSR. Abdomen soft, nondistened, & nontender to palpation. Stage 1 pressure ulcer noted on sacrum. MAEx4. +3 BLE edema noted on exam, & pt reports that this edema has been worsening more recently. Endorses orthopnea. Pt resting comfortably with Beir hugger with no complaints at this time. Pt's bed in the lowest position & explained POC to pt. Will continue to reassess.

## 2020-06-15 NOTE — H&P ADULT - ASSESSMENT
73 yo woman with COPD, HTN, DM (on metformin) p/w SOB x 1 day a/w 1 week increasing SNOW, found with compensated anion gap lactic acidosis (pH 7.2, HCO3 <6, lactate 16, CO2 17), likely 2/2 metformin, admitted to MICU for urgent CRRT    NEUROLOGIC:  - AOx3    CARDIOVASCULAR:  - TTE 12/2018:  EF 66%, estimated RV pressure 49mm Hg, consistent with mild pulmonary hypertension.  - +SNOW on exam, likely 2/2 RV dysfunction     RESPIRATORY:  - Tachypnea likely compensatory given profound metabolic acidosis which is appropriately compensated (CO2 17). Expect to improve with CRRT    VOLUME ASSESSMENT:  - 1 plus pitting edema of lower extreme b/l. Suspect in setting of elevated RV pressure in setting known pulm HTN in setting of COPD    GI:  - Diet: DASH/CC    Urinary tract:  - Obregon placed 6/15 for strict I/Os    METABOLIC:  - Metabolic acidosis 2/2 lactate. Likely type B2 in setting of metformin.   - pH 7.2 on admission with bicarb <7, lactate 16  - CRRT in ICU, for potential pressure support given labial BP    HEMATOLOGIC:  - Normocytic anemia. Hgb 10.5 on admission. Baseline ~13. +Occult blood    INFECTIOUS DISEASE:  - Pt without strong objective or clinical evidence of infection. Will observe off antibiotics    HEMODYNAMICS:  - Currently hemodynamically stable  - Will consider starting pressors during CRRT if BP remains labile (MAPs 60-65)

## 2020-06-15 NOTE — CONSULT NOTE ADULT - SUBJECTIVE AND OBJECTIVE BOX
75 yo woman with COPD< HTN, DM p/w SOB x 1 day a/w 1 week increasing SNOW. ED labs notable for anion gap metabolic acidosis (pH 7.2, HCO3 <10, lactate 16). Evaluated by Nephrology (Dr. Ashby) who is recommending urgent HD. 73 yo woman with COPD< HTN, DM (on metformin) p/w SOB x 1 day a/w 1 week increasing SNOW. Pt states she was in her usual state of health but has noted increased SNOW for the past week. The night PTA pt developed acute SOB. She denies orthepnea/PND. Exercise tolerance is difficult to assess as pt has been less. ED labs notable for anion gap metabolic acidosis (pH 7.2, HCO3 <6, lactate 16).     Home Meds:   - Metformin 500  - Amlodipine 40  - Metoprolol Succinate ER (200)  - Vitamin D 50 mcg (2000IU)   - Magnesium Oxide 400  - Torsemide 10 mg  - Breo Ellipta 200 daily  - Incruse Ellipta 62.5 daily. CHIEF COMPLAINT:    HPI:  75 yo woman with COPD< HTN, DM (on metformin) p/w SOB x 1 day a/w 1 week increasing SNOW. Pt states she was in her usual state of health but has noted increased SNOW for the past week. The night PTA pt developed acute SOB. No orthopnea or PND Exercise tolerance difficult to assess as pt has been less ambulatory since COVID-19 pandemic. Pt endorses decreased urine output over past 3 days. No change in diet or medications. Denies recent chest pain, cough, fever/chills, nausea/vomiting, diarrhea, dysuria.     ED labs notable for compensated anion gap metabolic acidosis (pH 7.2, HCO3 <6, lactate 16, CO2 17); BNP 12,500, Troponin negative, EKG without ischemic findings; Cr 1.7 (baseline ~1).        PAST MEDICAL & SURGICAL HISTORY:  HLD (hyperlipidemia)  DM (diabetes mellitus)  HTN (hypertension)  No significant past surgical history      FAMILY HISTORY: No relevant family hx    SOCIAL HISTORY:  Tobacco: 40+ pk/yr hx, quit 18 yrs ago  EtOH Use: socially  Occupation: retired from [employer]  Recent Travel: none  Advance Directives: HCP = , pt undecided on code status    Allergies: No Known Allergies    HOME MEDICATIONS:  - Metformin 500  - Amlodipine 40  - Metoprolol Succinate ER (200)  - Vitamin D 50 mcg (2000IU)   - Magnesium Oxide 400  - Torsemide 10 mg  - Breo Ellipta 200 daily  - Incruse Ellipta 62.5 daily    REVIEW OF SYSTEMS:  CONSTITUTIONAL: No fevers or chills  EYES: No blindness, no eye pain   ENT:  No throat pain, No rhinorrhea   NECK: No pain or stiffness  RESPIRATORY: +SOB. No cough, wheezing, hemoptysis;  CARDIOVASCULAR: No chest pain or palpitations  GASTROINTESTINAL: No abdominal or epigastric pain. No nausea, vomiting, or diarrhea   GENITOURINARY: No dysuria, change in frequency or hematuria  NEUROLOGICAL: No numbness or weakness  SKIN: No itching, burning, rashes, or lesions   PSYCH: No depression or anxiety  All other review of systems is negative unless indicated above.    OBJECTIVE:  ICU Vital Signs Last 24 Hrs  T(C): 36 (15 Yunier 2020 14:55), Max: 36 (15 Yunier 2020 14:55)  T(F): 96.8 (15 Yunier 2020 14:55), Max: 96.8 (15 Yunier 2020 14:55)  HR: 81 (15 Yunier 2020 14:55) (77 - 85)  BP: 97/58 (15 Yunier 2020 14:55) (86/63 - 126/86)  RR: 27 (15 Yunier 2020 14:55) (25 - 36)  SpO2: 97% (15 Yunier 2020 14:55) (90% - 100%)    PHYSICAL EXAM:  General: increased work of breathing  HEENT: PERRL, EOMI, MMM  Neck: JVD at 8cm  Respiratory: Tachypneic to 26, lungs CTAB, no wheeze  Cardiovascular: Regular rate and rhythm, no r/m/g   Abdomen: +BS, soft, non-tender, non-distended   Extremities: 1+ pitting edema of b/l lower extremities to mid tibia  Skin: warm, dry  Neurological: AOx3, CN 2-12 intact  Psychiatry: slightly anxious     LABS:             10.5   13.63 )-----------( 286      ( 15 Yunier 2020 09:52 )             38.2     06-15    136  |  93<L>  |  122<H>  ----------------------------<  175<H>  5.9<H>   |  <10<LL>  |  1.77<H>    Ca    9.5      15 Yunier 2020 09:52  Phos  4.7     06-15  Mg     2.4     06-15    TPro  6.8  /  Alb  3.7  /  TBili  0.8  /  DBili  x   /  AST  25  /  ALT  36  /  AlkPhos  118  06-15    PT/INR - ( 15 Yunier 2020 09:52 )   PT: 18.9 sec;   INR: 1.63 ratio         PTT - ( 15 Yunier 2020 09:52 )  PTT:22.8 sec  Urinalysis Basic - ( 15 Yunier 2020 12:54 )    Color: Light Yellow / Appearance: Clear / S.021 / pH: x  Gluc: x / Ketone: Small  / Bili: Negative / Urobili: Negative   Blood: x / Protein: 30 mg/dL / Nitrite: Negative   Leuk Esterase: Negative / RBC: 3 /hpf / WBC 1 /HPF   Sq Epi: x / Non Sq Epi: 1 /hpf / Bacteria: Negative    Venous Blood Gas:  06-15 @ 10:34  7.20/17/57/6/78  VBG Lactate: 16.0  Venous Blood Gas:  06-15 @ 09:52  7.17/20/49/  VBG Lactate: 16.0    ADIOLOGY:  [X] Reviewed and interpreted by me    < from: Xray Chest 1 View- PORTABLE-Routine (06.15.20 @ 10:22) >  The heart is normal in size. Left lower lobe pneumonia and/or atelectasis. Pulmonary vascular congestion. The bones appear to be demineralized.      < end of copied text >    EKG: EKG, SR at 78 bpm, suggestion of RV hypertrophy CHIEF COMPLAINT:    HPI:  73 yo woman with COPD, HTN, DM (on metformin) p/w SOB x 1 day a/w 1 week increasing SNOW. Pt states she was in her usual state of health but has noted increased SNOW for the past week. The night PTA pt developed acute SOB. No orthopnea or PND Exercise tolerance difficult to assess as pt has been less ambulatory since COVID-19 pandemic. Pt endorses decreased urine output over past 3 days. No change in diet or medications. Denies recent chest pain, cough, fever/chills, nausea/vomiting, diarrhea, dysuria.     ED labs notable for compensated anion gap metabolic acidosis (pH 7.2, HCO3 <6, lactate 16, CO2 17); BNP 12,500, Troponin negative, EKG without ischemic findings; Cr 1.7 (baseline ~1).        PAST MEDICAL & SURGICAL HISTORY:  HLD (hyperlipidemia)  DM (diabetes mellitus)  HTN (hypertension)  No significant past surgical history      FAMILY HISTORY: No relevant family hx    SOCIAL HISTORY:  Tobacco: 40+ pk/yr hx, quit 18 yrs ago  EtOH Use: socially  Occupation: retired from [employer]  Recent Travel: none  Advance Directives: HCP = , pt undecided on code status    Allergies: No Known Allergies    HOME MEDICATIONS:  - Metformin 500  - Amlodipine 40  - Metoprolol Succinate ER (200)  - Vitamin D 50 mcg (2000IU)   - Magnesium Oxide 400  - Torsemide 10 mg  - Breo Ellipta 200 daily  - Incruse Ellipta 62.5 daily    REVIEW OF SYSTEMS:  CONSTITUTIONAL: No fevers or chills  EYES: No blindness, no eye pain   ENT:  No throat pain, No rhinorrhea   NECK: No pain or stiffness  RESPIRATORY: +SOB. No cough, wheezing, hemoptysis;  CARDIOVASCULAR: No chest pain or palpitations  GASTROINTESTINAL: No abdominal or epigastric pain. No nausea, vomiting, or diarrhea   GENITOURINARY: No dysuria, change in frequency or hematuria  NEUROLOGICAL: No numbness or weakness  SKIN: No itching, burning, rashes, or lesions   PSYCH: No depression or anxiety  All other review of systems is negative unless indicated above.    OBJECTIVE:  ICU Vital Signs Last 24 Hrs  T(C): 36 (15 Yunier 2020 14:55), Max: 36 (15 Yunier 2020 14:55)  T(F): 96.8 (15 Yunier 2020 14:55), Max: 96.8 (15 Yunier 2020 14:55)  HR: 81 (15 Yunier 2020 14:55) (77 - 85)  BP: 97/58 (15 Yunier 2020 14:55) (86/63 - 126/86)  RR: 27 (15 Yunier 2020 14:55) (25 - 36)  SpO2: 97% (15 Yunier 2020 14:55) (90% - 100%)    PHYSICAL EXAM:  General: increased work of breathing  HEENT: PERRL, EOMI, MMM  Neck: JVD at 8cm  Respiratory: Tachypneic to 26, lungs CTAB, no wheeze  Cardiovascular: Regular rate and rhythm, no r/m/g   Abdomen: +BS, soft, non-tender, non-distended   Extremities: 1+ pitting edema of b/l lower extremities to mid tibia  Skin: warm, dry  Neurological: AOx3, CN 2-12 intact  Psychiatry: slightly anxious     LABS:             10.5   13.63 )-----------( 286      ( 15 Yunier 2020 09:52 )             38.2     06-15    136  |  93<L>  |  122<H>  ----------------------------<  175<H>  5.9<H>   |  <10<LL>  |  1.77<H>    Ca    9.5      15 Yunier 2020 09:52  Phos  4.7     06-15  Mg     2.4     06-15    TPro  6.8  /  Alb  3.7  /  TBili  0.8  /  DBili  x   /  AST  25  /  ALT  36  /  AlkPhos  118  06-15    PT/INR - ( 15 Yunier 2020 09:52 )   PT: 18.9 sec;   INR: 1.63 ratio         PTT - ( 15 Yunier 2020 09:52 )  PTT:22.8 sec  Urinalysis Basic - ( 15 Yunier 2020 12:54 )    Color: Light Yellow / Appearance: Clear / S.021 / pH: x  Gluc: x / Ketone: Small  / Bili: Negative / Urobili: Negative   Blood: x / Protein: 30 mg/dL / Nitrite: Negative   Leuk Esterase: Negative / RBC: 3 /hpf / WBC 1 /HPF   Sq Epi: x / Non Sq Epi: 1 /hpf / Bacteria: Negative    Venous Blood Gas:  06-15 @ 10:34  7.20/17/57/6/78  VBG Lactate: 16.0  Venous Blood Gas:  06-15 @ 09:52  7.17/20/49/  VBG Lactate: 16.0    ADIOLOGY:  [X] Reviewed and interpreted by me    < from: Xray Chest 1 View- PORTABLE-Routine (06.15.20 @ 10:22) >  The heart is normal in size. Left lower lobe pneumonia and/or atelectasis. Pulmonary vascular congestion. The bones appear to be demineralized.      < end of copied text >    EKG: EKG, SR at 78 bpm, suggestion of RV hypertrophy

## 2020-06-15 NOTE — DISCHARGE NOTE FOR THE EXPIRED PATIENT - HOSPITAL COURSE
73 yo F PMH COPD (emphysema), HTN,  pulmonary htn, DM (on metformin) who presented with acute shortness of breath. Pt stated that she was in her usual state of health, but had noted increased SNOW for the past week. The night prior to admission, she developed acute SOB. Denied orthopnea or PND. Exercise tolerance difficult to assess as pt has been less ambulatory since COVID-19 pandemic. Blood work notable for lactate of 16 with Bicarb less than 10 in setting of PINKY on CKD (Cr 1.7, baseline 1). Initial K 5.9. CXR with pulmonary vascular congestion.  In ED, given insulin and dextrose with bicarb to temporize potassium.  EKG without ischemic changes, Troponin 7.  Admitted to MICU for lactic acidemia in the setting of likely metformin toxicity.      Upon arrival, patient with continued shortness of breath.  Patient complaining of recent difficulty laying flat.  Shiley placed in left femoral vein due to patient's inability to maintain supine position.  Risks and benefits explained to patient at bedside. Patient agreeable to placement of catheter.  Placed in left groin under US, wire visualized in vein.  VBG sent for further confirmation with pO2 of 28. Hemodynamically stable.  HD RN to bedside to begin treatment.  Repeat lab work with bicarb level of 10, K 5.1. Complaining of  nausea prior to HD, Zofran given. Qtc 469. HD initiated. Dialysis nurse and primary ICU nurse at bedside.  Approximately two minutes into dialysis treatment, patient became bradycardiac.  Staff immediately alerted intensive care unit team. At bedside to assess,  patient with weak pulse.  CPR initiated. Dr. Barton notified, immediately to bedside.  CPR performed x 20 minutes, Epi x 5, Dextrose x 2, Sodium Bicarbonate x 4, and Ca Chloride x 1.  Throughout most of arrest, patient asystole. Shock x 1 for possible fine VF.  After twenty minutes, patient still without pulse.  Dr. Barton visualized myocardial standstill on US.  Pronounced 2103.     Dr Cornelius informed patient's  Mr Goldberg who is planning on coming to hospital. Presented to medical examiner's office.  Case discussed with Marisela, currently pending ME decision in morning. 73 yo F PMH COPD (emphysema), HTN,  pulmonary htn, DM (on metformin) who presented with acute shortness of breath. Pt stated that she was in her usual state of health, but had noted increased SNOW for the past week. The night prior to admission, she developed acute SOB. Denied orthopnea or PND. Exercise tolerance difficult to assess as pt has been less ambulatory since COVID-19 pandemic. Blood work notable for lactate of 16 with Bicarb less than 10 in setting of PINKY on CKD (Cr 1.7, baseline 1). Initial K 5.9. CXR with pulmonary vascular congestion.  In ED, given insulin and dextrose with bicarb to temporize potassium.  EKG without ischemic changes, Troponin 7.  Admitted to MICU for lactic acidemia in the setting of likely metformin toxicity.      Upon arrival, patient with continued shortness of breath.  Patient complaining of recent difficulty laying flat.  Shiley placed in left femoral vein due to patient's inability to maintain supine position.  Risks and benefits explained to patient at bedside. Patient agreeable to placement of catheter.  Placed in left groin under US, wire visualized in vein.  VBG sent for further confirmation with pO2 of 28. Hemodynamically stable.  HD RN to bedside to begin treatment.  Repeat lab work with bicarb level of 10, K 5.1. Complaining of  nausea prior to HD, Zofran given. Qtc 469. HD initiated. Dialysis nurse and primary ICU nurse at bedside.  Approximately two minutes into dialysis treatment, patient became significantly bradycardiac to 40s. HD stopped, blood returned to patient. Staff immediately alerted intensive care unit team. At bedside to assess,  patient with weak pulse.  CPR initiated. Continued to chantell to 30s.  Dr. Barton notified, immediately to bedside.  CPR performed x 20 minutes, Epi x 5, Dextrose x 2, Sodium Bicarbonate x 4, and Ca Chloride x 1.  Throughout most of arrest, patient asystole. Shock x 1 for possible fine VF.  After twenty minutes, patient still without pulse.  Dr. Barton visualized myocardial standstill on US.  Pronounced 2103.     Dr Cornelius informed patient's  Mr Goldberg who is planning on coming to hospital. Presented to medical examiner's office.  Case discussed with Marisela, currently pending ME decision in morning. 75 yo F PMH COPD (emphysema), HTN,  pulmonary htn, DM (on metformin) who presented with acute shortness of breath. Pt stated that she was in her usual state of health, but had noted increased SNOW for the past week. The night prior to admission, she developed acute SOB. Denied orthopnea or PND. Exercise tolerance difficult to assess as pt has been less ambulatory since COVID-19 pandemic. Blood work notable for lactate of 16 with Bicarb less than 10 in setting of PINKY on CKD (Cr 1.7, baseline 1). Initial K 5.9. CXR with pulmonary vascular congestion.  In ED, given insulin and dextrose with bicarb to temporize potassium.  EKG without ischemic changes, Troponin 7.  Admitted to MICU for lactic acidemia in the setting of likely metformin toxicity.      Upon arrival, patient with continued shortness of breath.  Patient complaining of recent difficulty laying flat.  Shiley placed in left femoral vein due to patient's inability to maintain supine position.  Risks and benefits explained to patient at bedside. Patient agreeable to placement of catheter.  Placed in left groin under US, wire visualized in vein.  VBG sent for further confirmation with pO2 of 28. Hemodynamically stable.  HD RN to bedside to begin treatment.  Repeat lab work with bicarb level of 10, K 5.1. Complaining of  nausea prior to HD, Zofran given. Qtc 469. HD initiated. Dialysis nurse and primary ICU nurse at bedside.  Approximately two minutes into dialysis treatment, patient became significantly bradycardiac to 40s. HD stopped, blood returned to patient. Staff immediately alerted intensive care unit team. At bedside to assess,  patient with weak pulse.  CPR initiated. Continued to chantell to 30s.  Dr. Barton notified, immediately to bedside.  CPR performed x 20 minutes, Epi x 5, Dextrose x 2, Sodium Bicarbonate x 4, and Ca Chloride x 1.  Throughout most of arrest, patient asystole. Shock x 1 for possible fine VF.  After twenty minutes, patient still without pulse.  Dr. Barton visualized myocardial standstill on US.  Pronounced 2103.     Dr Cornelius informed patient's  Mr Goldberg who is planning on coming to hospital. Presented to medical examiner's office.  Case discussed with Marisela, currently pending ME decision in morning.  Update:  to bedside; does not wish to have autopsy done in part due to [employer] reasons. Informed  that team would mention this to ME should they desire to take case, though, ultimately, is up to ME to make final decision.

## 2020-06-15 NOTE — ED PROVIDER NOTE - PHYSICAL EXAMINATION
*Gen: AAO*3  *HEENT: NC/AT, MMM, airway patent, trachea midline  *CV: RRR, S1/S2 present, no murmurs/rubs, b/l LE swelling  *Resp: tachypnea, LCTAB, no wheezing/rales  *Abd: non-distended, soft N/Tx4, no guarding or rigidity  *Neuro: no focal neuro deficits, moving all limbs appropriately  *Extremities: no gross deformity  *Skin: no rashes, no wounds   ~ Sera Santana M.D.

## 2020-06-15 NOTE — ED ADULT NURSE NOTE - OBJECTIVE STATEMENT
Pt is a 73 y/o female hx COPD Pt is a 73 y/o female hx COPD on 2L intermittently at home c/o SOB worsening this morning. Pt presents tachypneic and labored, O2 placed for comfort and pt sitting upright. MD aware all VS. O2 placed on pt. Denies covid contats or hx. Denies CP, N/V/D, numbness, tingling, cough, fever, chills, dizziness, weakness, headache. Pt appears weak. Pt states she has had increased leg swelling x1 week, has bilateral lower leg edema. A&Ox3. Abdomen soft, nontender, nondistended. Stage 1 pressure ulcer present on sacrum, skin nonblanchable pink. Safety and comfort measures provided.

## 2020-06-15 NOTE — ED PROVIDER NOTE - PROGRESS NOTE DETAILS
Max CORTEZ: Given hypothermia, hypotension and elevated lactate levels - empiric dosing of antibiotics.  Hyperkalemia treatment. Max CORTEZ: Spoke to Michael (Nephrologist); will come see patient in ER.  Would like to hold Lasix. Angeline: labs concerning for MARYLU, PINKY w/ metabolic acidosis and extremely high lactate - pt. nephrologist Dr Rodriguez notified and will eval pt. at bedside. CTA cancelled, will need VQ scan as inpatient. Max CORTEZ: MICU consulted Angeline: Spoke with Dr. Rodriguez, pt. will likely require CRRT, will make MICU aware. Pt. currently refusing CRRT and shiley placement wants to speak to her endocrinologist first, will contact her endo to discuss. Angeline: Pt. remains tachypneic, dimer is elevated, therefor PE cannot be ruled out, CTA cancelled secondary to PINKY and significant concern for MARYLU, however pt is not hypoxic or tachycardic, clinical picture more consistent with pulmonary HTN, regardless will consider prophylactic heparinization, will discuss with MICU and nephro, guaiac pending. Stool positive for occult blood, will hold of on heparinization for now and order V/Q scan Pt has MICU bed waiting, called MICU, they will place shiley so pt. does not have to wait any longer down in the ED Angeline: Pt. remains tachypneic, dimer is elevated, therefor PE cannot be ruled out, CTA cancelled secondary to PINKY and significant concern for MARYLU, however pt is not hypoxic or tachycardic, clinical picture more consistent with pulmonary HTN (diagnosed last year) or new onset CHF, regardless will consider prophylactic heparinization, will discuss with MICU and nephro, guaiac pending.

## 2020-06-15 NOTE — ED ADULT NURSE REASSESSMENT NOTE - NS ED NURSE REASSESS COMMENT FT1
Hypothermia blanket placed on pt for low rectal temp. MD states no acute interventions needed for pt r/t bloodwork

## 2020-06-15 NOTE — CONSULT NOTE ADULT - SUBJECTIVE AND OBJECTIVE BOX
Dr. Ashby  Office [phone]  Cell [phone]  Shara FERNANDEZ  Cell [phone]    HPI:  74 year-old female with history of hypothyroidism, COPD, pulmonary HTN, DM presents to the Emergency Department for shortness of breath.  Patient reports she has been having shortness of breath ongoing since yesterday; worsening.  No associated fevers, chills, nausea, vomiting, chest pain, cough, lightheadedness.  + LE swelling bilaterally has been ongoing for the past week.  Patient was scheduled for a V/Q scan scheduled today. Found to have renal failure, acidosis, hypothermic    Allergies:  No Known Allergies      PAST MEDICAL & SURGICAL HISTORY:  HLD (hyperlipidemia)  DM (diabetes mellitus)  HTN (hypertension)  No significant past surgical history      Home Medications Reviewed    Hospital Medications:   MEDICATIONS  (STANDING):      SOCIAL HISTORY:  Denies ETOh, Smoking,     FAMILY HISTORY:  No pertinent family history in first degree relatives      REVIEW OF SYSTEMS:  CONSTITUTIONAL: has weakness, no fevers or chills  EYES/ENT: No visual changes;  No vertigo or throat pain   NECK: No pain or stiffness  RESPIRATORY: as per HPI  CARDIOVASCULAR: No chest pain or palpitations.  GASTROINTESTINAL: No abdominal or epigastric pain. No nausea, vomiting, or hematemesis; No diarrhea or constipation. No melena or hematochezia.  GENITOURINARY: No dysuria, frequency, foamy urine, urinary urgency, incontinence or hematuria  NEUROLOGICAL: No numbness or weakness  SKIN: No itching, burning, rashes, or lesions   VASCULAR: has bilateral lower extremity edema.   All other review of systems is negative unless indicated above.    VITALS:  T(F): 94.7 (06-15-20 @ 12:22), Max: 95.6 (06-15-20 @ 09:48)  HR: 85 (06-15-20 @ 11:34)  BP: 101/59 (06-15-20 @ 11:34)  RR: 36 (06-15-20 @ 11:34)  SpO2: 98% (06-15-20 @ 11:34)  Wt(kg): --    Height (cm): 157.48 (06-15 @ 09:12)  Weight (kg): 61.7 (06-15 @ 09:12)  BMI (kg/m2): 24.9 (06-15 @ 09:12)  BSA (m2): 1.62 (06-15 @ 09:12)    PHYSICAL EXAM:  Constitutional: NAD  HEENT: anicteric sclera, oropharynx clear, MMM  Neck: No JVD  Respiratory: CTAB, no wheezes, rales or rhonchi  Cardiovascular: S1, S2, RRR  Gastrointestinal: BS+, soft, NT/ND  Extremities: No cyanosis or clubbing. 2+ peripheral edema  Neurological: A/O x 3, no focal deficits  Psychiatric: Normal mood, normal affect  : No CVA tenderness. No grullon.   Skin: No rashes       LABS:  06-15    136  |  93<L>  |  122<H>  ----------------------------<  175<H>  5.9<H>   |  <10<LL>  |  1.77<H>    Ca    9.5      15 Yunier 2020 09:52  Phos  4.7     06-15  Mg     2.4     06-15    TPro  6.8  /  Alb  3.7  /  TBili  0.8  /  DBili      /  AST  25  /  ALT  36  /  AlkPhos  118  06-15    Creatinine Trend: 1.77 <--                        10.5   13.63 )-----------( 286      ( 15 Yunier 2020 09:52 )             38.2     Urine Studies:        RADIOLOGY & ADDITIONAL STUDIES:

## 2020-06-15 NOTE — DISCHARGE NOTE FOR THE EXPIRED PATIENT - OTHER SIGNIFICANT FINDINGS
ADDENDUM:  1. Patient's acute-on-chronic respiratory failure was due to COPD / emphysema.  2. Unclear etiology of cardiac arrest. Patient had mild hyperkalemia, but initial rhythm was bradycardia, making hemodynamic instability or acidosis more likely etiology. However, it cannot be determined.  3. Right heart failure was due to chronic pulmonary hypertension. Pulmonary congestion was minimal, but likely consequence of acute renal failure. There was no documented evidence of LV systolic failure.

## 2020-06-15 NOTE — CONSULT NOTE ADULT - SUBJECTIVE AND OBJECTIVE BOX
CHIEF COMPLAINT:Patient is a 74y old  Female who presents with a chief complaint of sob    HPI:  74 year-old female with history of hypothyroidism, COPD, pulmonary HTN, DM presents to the Emergency Department for shortness of breath.  Patient reports she has been having shortness of breath ongoing since yesterday; worsening.  No associated fevers, chills, nausea, vomiting, chest pain, cough, lightheadedness.  + LE swelling bilaterally has been ongoing for the past week.  Patient was scheduled for a V/Q scan scheduled today.  pt was seen by me on the last admission and supposed to have a stress test as out pt.    PAST MEDICAL & SURGICAL HISTORY:  HLD (hyperlipidemia)  DM (diabetes mellitus)  HTN (hypertension)  No significant past surgical history      MEDICATIONS  (STANDING):    MEDICATIONS  (PRN):      FAMILY HISTORY:  No pertinent family history in first degree relatives      SOCIAL HISTORY:    [ ] Non-smoker  [ ] Smoker  [ ] Alcohol    Allergies    No Known Allergies    Intolerances    	    REVIEW OF SYSTEMS:  CONSTITUTIONAL: No fever, weight loss, or fatigue  EYES: No eye pain, visual disturbances, or discharge  ENT:  No difficulty hearing, tinnitus, vertigo; No sinus or throat pain  NECK: No pain or stiffness  RESPIRATORY: No cough, wheezing, chills or hemoptysis; + Shortness of Breath  CARDIOVASCULAR: No chest pain, palpitations, passing out, dizziness, or leg swelling  GASTROINTESTINAL: No abdominal or epigastric pain. No nausea, vomiting, or hematemesis; No diarrhea or constipation. No melena or hematochezia.  GENITOURINARY: No dysuria, frequency, hematuria, or incontinence  NEUROLOGICAL: No headaches, memory loss, loss of strength, numbness, or tremors  SKIN: No itching, burning, rashes, or lesions   LYMPH Nodes: No enlarged glands  ENDOCRINE: No heat or cold intolerance; No hair loss  MUSCULOSKELETAL: No joint pain or swelling; No muscle, back, or extremity pain  PSYCHIATRIC: No depression, anxiety, mood swings, or difficulty sleeping  HEME/LYMPH: No easy bruising, or bleeding gums  ALLERGY AND IMMUNOLOGIC: No hives or eczema	    [ ] All others negative	  [ ] Unable to obtain    PHYSICAL EXAM:  T(C): 36 (06-15-20 @ 14:55), Max: 36 (06-15-20 @ 14:55)  HR: 81 (06-15-20 @ 14:55) (77 - 85)  BP: 97/58 (06-15-20 @ 14:55) (86/63 - 126/86)  RR: 27 (06-15-20 @ 14:55) (25 - 36)  SpO2: 97% (06-15-20 @ 14:55) (90% - 100%)  Wt(kg): --  I&O's Summary      Appearance: Normal	  HEENT:   Normal oral mucosa, PERRL, EOMI	  Lymphatic: No lymphadenopathy  Cardiovascular: Normal S1 S2, No JVD, + murmurs, No edema  Respiratory: Lungs clear to auscultation	  Psychiatry: A & O x 3, Mood & affect appropriate  Gastrointestinal:  Soft, Non-tender, + BS	  Skin: No rashes, No ecchymoses, No cyanosis	  Neurologic: Non-focal  Extremities: Normal range of motion, No clubbing, cyanosis or edema  Vascular: Peripheral pulses palpable 2+ bilaterally    TELEMETRY: 	    ECG:  	  RADIOLOGY:  OTHER: 	  	  LABS:	 	    CARDIAC MARKERS:                              10.5   13.63 )-----------( 286      ( 15 Yunier 2020 09:52 )             38.2     06-15    136  |  93<L>  |  122<H>  ----------------------------<  175<H>  5.9<H>   |  <10<LL>  |  1.77<H>    Ca    9.5      15 Yunier 2020 09:52  Phos  4.7     06-15  Mg     2.4     06-15    TPro  6.8  /  Alb  3.7  /  TBili  0.8  /  DBili  x   /  AST  25  /  ALT  36  /  AlkPhos  118  06-15    proBNP: Serum Pro-Brain Natriuretic Peptide: 99494 pg/mL (06-15 @ 09:52)    Lipid Profile:   HgA1c:   TSH: Thyroid Stimulating Hormone, Serum: 2.66 uIU/mL (06-15 @ 13:53)    PT/INR - ( 15 Yunier 2020 09:52 )   PT: 18.9 sec;   INR: 1.63 ratio         PTT - ( 15 Yunier 2020 09:52 )  PTT:22.8 sec    PREVIOUS DIAGNOSTIC TESTING:      < from: 12 Lead ECG (06.15.20 @ 09:39) >    Diagnosis Line NORMAL SINUS RHYTHM  RIGHT AXIS DEVIATION  POSSIBLE RIGHT VENTRICULAR HYPERTROPHY  ABNORMAL ECG    < from: Transthoracic Echocardiogram (12.12.18 @ 15:46) >  1. Normal mitral valve. Minimal mitral regurgitation.  2. Normal trileaflet aortic valve.  3. Normal left ventricular internal dimensions and wall  thicknesses.  4. Normal left ventricular systolic function. No segmental  wall motion abnormalities.  5. Normal right ventricular size and function.  6. Estimated right ventricular systolic pressure equals 49  mm Hg, assuming right atrial pressure equals 8 mm Hg,  consistent with mild pulmonary hypertension.  7. Agitated saline injection demonstrates no evidence of a  patent foramen ovale.    < from: Xray Chest 1 View- PORTABLE-Routine (06.15.20 @ 10:22) >  The heart is normal in size. Left lower lobe pneumonia and/or atelectasis. Pulmonary vascular congestion. The bones appear to be demineralized.    < from: CT Head No Cont (01.04.19 @ 14:03) >  No CT evidence of acute territorial infarct, intracranial hemorrhage,   brain edema, or mass effect.

## 2020-06-15 NOTE — H&P ADULT - ATTENDING COMMENTS
See consult note.  In brief: LE edema and dyspnea with known pulmonary hypertension, likely WHO Group 3 given known emphysema.  ABG shows appropriate respiratory compensation for profound metabolic acidosis that is a gap acidosis due to elevated lactate, type B2 of metformin toxicity. This means patient can still ventilate despite significant emphysema.  Urgent HD for unremitting acidosis and hyperkalemia.  May need pressor support during HD  Admit to ICU for closer monitoring.

## 2020-06-15 NOTE — CHART NOTE - NSCHARTNOTEFT_GEN_A_CORE
Notified by RN that patient becoming bradycardiac while on HD.  HD immediately stopped, blood returned to patient. Carotid pulse weak. CPR initiated. Performed x 20 minutes, Epi x 5, sodium bicarb x 4, dextrose x 2, calcium chloride x 1 give. Predominantly an asystole arrest. Shock x 1 for fine VF.  Dr Barton visualized myocardial standstill on US. No spontaneous respirations, no heart sounds. Pronounced 2103. ME informed of case, pending AM decision by  medical examiner.   informed, on his way to hospital. Notified by RN that patient becoming bradycardiac while on HD.  HD immediately stopped, blood returned to patient. Carotid pulse weak. CPR initiated. Performed x 20 minutes, Epi x 5, sodium bicarb x 4, dextrose x 2, calcium chloride x 1 give. Predominantly an asystole arrest. Shock x 1 for fine VF.  Dr Barton visualized myocardial standstill on US. No spontaneous respirations, no heart sounds. Pronounced 2103. ME informed of case, pending AM decision by  medical examiner.   informed, on his way to hospital.         Critical Care Attending Attestation:   Called in for sudden Cardiac Arrest 2 min after initiating HD. Bradycardia and loss of pulse noted and ACLS/ BLS initiated immediately.   CPR performed and cardiac medications given accordingly as above X 20 Min without response. Bedside POCUS didn't showed any pneumothorax, RV overload, pericardial effusion or lung congestion but remained myocardial standstill at 20 Min CPR. She was pronounced death at 21:03 Hr and  and Son-In-Law informed and clarified pending family visit and medical examiner evaluation.     Patient examined with ICU Resident/Fellow at bedside during CPR and lab data, medical records and radiology reports reviewed. I have read and agreeable with resident's Documentation, Assessment and Management Plans above in general which reflected my opinions from discussion.   Total Critical Care Time = 45 Min excluding teaching and procedure activity.

## 2020-06-15 NOTE — ED ADULT NURSE REASSESSMENT NOTE - NS ED NURSE REASSESS COMMENT FT1
Assisted pt to use the bedpan. Pt resting comfortably without complaints & does not appear to be in any acute distress at this time with VSS. Will continue to reassess.

## 2020-06-15 NOTE — CONSULT NOTE ADULT - ASSESSMENT
ASSESSMENT:    PINKY with BUN >> creatinine (heme positive stool) - profound lactic acidosis - marked anion gap metabolic acidosis - out of proportion to clinical findings except for tachypnea compensating for the acidemia - suspect related to glucophage related lactic acidosis - little evidence of infection despite hypothermia and episodes of hypotension    h/o oxygen dependent COPD/emphysema    HTN/HLD/DM    PLAN/RECOMMENDATIONS:    oxygen supplementation as needed to keep saturation greater than 92%  pressors as needed for MAP > 65mmHg  hold anti-hypertensive agents  hold glucophage  albuterol/atrovent nebs q6h  sodium bicarbonate gtt  renal evaluation noted - MICU admission for CRRT - lokelma - calcium gluconate    Thank you for the courtesy of this referral. Plan of care discussed with the patient at bedside and the ER staff    Phong Iglesias MD, [facility]  [phone]  Pulmonary Medicine ASSESSMENT:    PINKY with BUN >> creatinine (heme positive stool) - profound lactic acidosis - marked anion gap metabolic acidosis - out of proportion to clinical findings except for tachypnea compensating for the acidemia - suspect related to glucophage related lactic acidosis - little evidence of infection despite hypothermia and episodes of hypotension    chronic hypoxic respiratory failure  1) severe COPD/emphysema  2) abnormal CXR - does not appear to be fluid overloaded - the left lower lobe opacity represents the patient's known very large hiatal hernia compressing the left lung    HTN/HLD/DM    PLAN/RECOMMENDATIONS:    oxygen supplementation as needed to keep saturation greater than 92%  pressors as needed for MAP > 65mmHg  hold anti-hypertensive agents  hold glucophage  albuterol/atrovent nebs q6h  sodium bicarbonate gtt  renal evaluation noted - MICU admission for CRRT - lokelma - calcium gluconate    Thank you for the courtesy of this referral. Plan of care discussed with the patient at bedside and the ER staff    Phong Iglesias MD, [facility]  [phone]  Pulmonary Medicine

## 2020-06-15 NOTE — H&P ADULT - NSHPPHYSICALEXAM_GEN_ALL_CORE
VITALS:  ICU Vital Signs Last 24 Hrs  T(C): 36 (15 Yunier 2020 14:55), Max: 36 (15 Yunier 2020 14:55)  T(F): 96.8 (15 Yunier 2020 14:55), Max: 96.8 (15 Yunier 2020 14:55)  HR: 81 (15 Yunier 2020 14:55) (77 - 85)  BP: 97/58 (15 Yunier 2020 14:55) (86/63 - 126/86)  RR: 27 (15 Yunier 2020 14:55) (25 - 36)  SpO2: 97% (15 Yunier 2020 14:55) (90% - 100%)    General: increased work of breathing  HEENT: PERRL, EOMI, MMM  Neck: JVD at 8cm  Respiratory: Tachypneic to 26, lungs CTAB, no wheeze  Cardiovascular: Regular rate and rhythm, no r/m/g   Abdomen: +BS, soft, non-tender, non-distended   Extremities: 1+ pitting edema of b/l lower extremities to mid tibia  Skin: warm, dry  Neurological: AOx3, CN 2-12 intact  Psychiatry: slightly anxious

## 2020-06-15 NOTE — ED PROVIDER NOTE - CARE PLAN
Principal Discharge DX:	SOB (shortness of breath)  Secondary Diagnosis:	Tachypnea  Secondary Diagnosis:	Lactic acidosis  Secondary Diagnosis:	Hyperkalemia  Secondary Diagnosis:	Acute right-sided heart failure

## 2020-06-15 NOTE — CONSULT NOTE ADULT - ASSESSMENT
74 year-old female with history of hypothyroidism, COPD, pulmonary HTN, DM presents to the Emergency Department for shortness of breath. Found to have PINKY, acidosis    A/P:  PINYK:  Etiology?  Rule out ATN  Hypothermic, hypotensive, to be ruled out for sepsis  get grullon's catheter, monitor I/O  Send UA, Urine Na, Urea nitrogen, Cr, Renal US  In view of severe acidosis, hyperkalemia and inability to give IVF (pulmonary congestion, edema) she would benefit from dialysis. In view of marginal BP she would benefit from CRRT. Consult ICU  She wanted me to speak to Dr. Miller (Endocrinologist), which I did and he is in agreement      Hyperkalemia:  Likely sec to acidosis, renal failure  Calcium gluconate 1gm IV one dose  Give Lokelma 10gm Q4hr X 2    Acidosis:  AG, lactic acidosis  Etiology?  Possible sec to sepsis, also on metformin    Fluid Overload:  combination of renal failure + right sided heart failure 74 year-old female with history of hypothyroidism, COPD, pulmonary HTN, DM presents to the Emergency Department for shortness of breath. Found to have PINKY, acidosis    A/P:  PINKY:  Etiology?  Rule out ATN  Hypothermic, hypotensive, to be ruled out for sepsis  get grullon's catheter, monitor I/O  Send UA, Urine Na, Urea nitrogen, Cr, Renal US  In view of severe acidosis, hyperkalemia and inability to give IVF (pulmonary congestion, edema) she would benefit from dialysis. In view of marginal BP she would benefit from /HDCRRT. Consult ICU  VERBAL consent obtained from patient for CRRT/HD- all quesions answered  She wanted me to speak to Dr. Miller (Endocrinologist), which I did and he is in agreement      Hyperkalemia:  Likely sec to acidosis, renal failure  Calcium gluconate 1gm IV one dose  Give Lokelma 10gm Q4hr X 2    Acidosis:  AG, lactic acidosis  Etiology?  Possible sec to sepsis, also on metformin    Fluid Overload:  combination of renal failure + right sided heart failure

## 2020-06-16 LAB
HBV CORE AB SER-ACNC: SIGNIFICANT CHANGE UP
HBV CORE IGM SER-ACNC: SIGNIFICANT CHANGE UP
HBV SURFACE AB SER-ACNC: <3 MIU/ML — LOW
HBV SURFACE AG SER-ACNC: SIGNIFICANT CHANGE UP
HCV AB S/CO SERPL IA: 0.18 S/CO — SIGNIFICANT CHANGE UP (ref 0–0.99)
HCV AB SERPL-IMP: SIGNIFICANT CHANGE UP

## 2020-06-20 LAB
CULTURE RESULTS: SIGNIFICANT CHANGE UP
CULTURE RESULTS: SIGNIFICANT CHANGE UP
SPECIMEN SOURCE: SIGNIFICANT CHANGE UP
SPECIMEN SOURCE: SIGNIFICANT CHANGE UP

## 2020-09-15 NOTE — ED PROVIDER NOTE - CCCP TRG CHIEF CMPLNT
shortness of breath
Principal Discharge DX:	Unstable angina pectoris due to coronary arteriosclerosis

## 2020-10-15 NOTE — ED ADULT NURSE NOTE - DRUG PRE-SCREENING (DAST -1)
The RxSight Light Adjustable Lens (RxLAL) is similar to other intraocular lenses (IOLs) that can be implanted in the eye to replace the natural lens that is removed during cataract surgery. While all IOLs improve vision after cataract surgery, most patients will require glasses (or contact lenses) to improve their vision to the level required for driving or reading. The RxLAL reduces the need for glasses or contact lenses by being able to change its focusing power after it is implanted in the eye. The focusing power of the RxLAL is adjusted by specific patterns of ultraviolet (UV) light produced by the RxSight Light Delivery Device (LDD), an instrument that the doctor uses in the office beginning 2-3 weeks after cataract surgery. Up to three light adjustment treatments can be performed to improve the patients vision, with a separation of 3 days between treatments. When the patient and doctor are satisfied with the vision, the same LDD is used to lockin the RxLAL and make the prescription permanent. From immediately after surgery until 24 hours after the completion of the lockin treatment, it was discussed that the need to protect the RxLAL from UV light in the environment by wearing BRITNEY specific protective eyewear provided during all waking hours. Statement Selected

## 2020-11-24 NOTE — H&P ADULT - NSHPOUTPATIENTPROVIDERS_GEN_ALL_CORE
Bharat Mock, PMD/Cardiology Scribe Attestation (For Scribes USE Only)... Scribe Attestation (For Scribes USE Only).../I have personally evaluated and examined the patient. The Attending was available to me as a supervising provider if needed.

## 2023-07-27 NOTE — PROGRESS NOTE ADULT - ASSESSMENT
72 yo F with pmhx COPD (on home O2 and prednisone 5mg) , htn  , hld  and dm  2, presenting with dizziness x three days. She was found to have hypokalemia, hypomagnesemia, she was recently also admitted for hypokalemia. She occasionally has lose bowel motion and she had that few times last night, also has poor appetite. While talking to her she started shaking, developed tachycardia, had to be given xanax to calm her down. 1 = Total assistance

## 2024-03-04 NOTE — ED ADULT NURSE NOTE - NSSISCREENINGQ3_ED_A_ED
Continue your current medications, healthy diet and exercise as tolerated.  You had lab tests today. You should receive a call or my chart message with your test results. If you have not received your results in the next 7-10 days, please contact the office.  If A1C is above 7, will add ozempic.   Klonopin is a controlled medication. This medication has the risk of dependence and addiction. Use the medication only as directed. You will be required to be seen every 90 days and have routine drug screening and Driss monitoring while on the medication. If you stop the medication, be sure to dispose of the medication properly.        
No

## 2025-04-11 NOTE — ED ADULT TRIAGE NOTE - ESI TRIAGE ACUITY LEVEL, MLM
[facility] - Endoscopy  Gastroenterology  H&P    Patient Name: Diego Thompson  MRN: [id number]  Admission Date: 2025  Code Status: Prior    Attending Provider: Crhistian Villarreal MD   Primary Care Physician: Christian Villarreal MD  Principal Problem:<principal problem not specified>    Subjective:     History of Present Illness:  74-year-old female who presents for screening colonoscopy her last colonoscopy was in .    Past Medical History:   Diagnosis Date    Arthritis     Digestive disorder     DM (diabetes mellitus)     Epilepsy     High cholesterol     HTN (hypertension)     PONV (postoperative nausea and vomiting)        Past Surgical History:   Procedure Laterality Date    adenoidectomy      APPENDECTOMY       SECTION      GALLBLADDER SURGERY      HERNIA REPAIR      HYSTERECTOMY      KNEE ARTHROSCOPY Bilateral     OOPHORECTOMY      TONSILLECTOMY         Review of patient's allergies indicates:  No Known Allergies  Family History       Problem Relation (Age of Onset)    Diabetes Sister, Maternal Grandmother          Tobacco Use    Smoking status: Never     Passive exposure: Never    Smokeless tobacco: Never   Substance and Sexual Activity    Alcohol use: Not Currently    Drug use: Never    Sexual activity: Not on file     Review of Systems   Constitutional: Negative.    HENT: Negative.     Respiratory: Negative.     Cardiovascular: Negative.    Gastrointestinal: Negative.      Objective:     Vital Signs (Most Recent):  Temp: 97.9 °F (36.6 °C) (25)  Pulse: (!) 55 (25)  Resp: 20 (25)  BP: (!) 142/68 (25)  SpO2: (!) 94 % (25) Vital Signs (24h Range):  Temp:  [97.9 °F (36.6 °C)] 97.9 °F (36.6 °C)  Pulse:  [55] 55  Resp:  [20] 20  SpO2:  [94 %] 94 %  BP: (142)/(68) 142/68     Weight: 73.5 kg (162 lb) (25)  Body mass index is 32.72 kg/m².    No intake or output data in the 24 hours ending 25    Lines/Drains/Airways        "Peripheral Intravenous Line  Duration                  Peripheral IV - Single Lumen 04/11/25 0722 22 G Right Antecubital <1 day                    Physical Exam  Vitals reviewed.   Constitutional:       General: She is not in acute distress.     Appearance: Normal appearance. She is well-developed. She is not ill-appearing.   HENT:      Head: Normocephalic and atraumatic.      Nose: Nose normal.   Eyes:      Pupils: Pupils are equal, round, and reactive to light.   Cardiovascular:      Rate and Rhythm: Normal rate and regular rhythm.   Pulmonary:      Effort: Pulmonary effort is normal.      Breath sounds: Normal breath sounds. No wheezing.   Abdominal:      General: Abdomen is flat. Bowel sounds are normal. There is no distension.      Palpations: Abdomen is soft.      Tenderness: There is no abdominal tenderness. There is no guarding.   Skin:     General: Skin is warm and dry.      Coloration: Skin is not jaundiced.   Neurological:      Mental Status: She is alert.   Psychiatric:         Attention and Perception: Attention normal.         Mood and Affect: Affect normal.         Speech: Speech normal.         Behavior: Behavior is cooperative.      Comments: Pt was calm while speaking.         Significant Labs:  CBC: No results for input(s): "WBC", "HGB", "HCT", "PLT" in the last 48 hours.  CMP: No results for input(s): "GLU", "CALCIUM", "ALBUMIN", "PROT", "NA", "K", "CO2", "CL", "BUN", "CREATININE", "ALKPHOS", "ALT", "AST", "BILITOT" in the last 48 hours.    Significant Imaging:  Imaging results within the past 24 hours have been reviewed.    Assessment/Plan:     There are no hospital problems to display for this patient.        Impression: Screening for colon neoplasm  Plan: Colonoscopy today    Rjaiv Reece MD  Gastroenterology  [facility] End[facility]  " 3